# Patient Record
Sex: MALE | Race: WHITE | NOT HISPANIC OR LATINO | Employment: FULL TIME | URBAN - METROPOLITAN AREA
[De-identification: names, ages, dates, MRNs, and addresses within clinical notes are randomized per-mention and may not be internally consistent; named-entity substitution may affect disease eponyms.]

---

## 2017-04-02 ENCOUNTER — APPOINTMENT (OUTPATIENT)
Dept: CT IMAGING | Facility: HOSPITAL | Age: 27
End: 2017-04-02

## 2017-04-02 PROCEDURE — 70450 CT HEAD/BRAIN W/O DYE: CPT

## 2017-04-02 PROCEDURE — 99282 EMERGENCY DEPT VISIT SF MDM: CPT

## 2017-04-03 ENCOUNTER — HOSPITAL ENCOUNTER (EMERGENCY)
Facility: HOSPITAL | Age: 27
Discharge: HOME | End: 2017-04-03
Attending: EMERGENCY MEDICINE | Admitting: EMERGENCY MEDICINE

## 2017-04-03 VITALS
HEIGHT: 69 IN | WEIGHT: 193 LBS | TEMPERATURE: 97.9 F | DIASTOLIC BLOOD PRESSURE: 93 MMHG | HEART RATE: 87 BPM | BODY MASS INDEX: 28.58 KG/M2 | SYSTOLIC BLOOD PRESSURE: 139 MMHG | RESPIRATION RATE: 16 BRPM | OXYGEN SATURATION: 98 %

## 2017-04-03 DIAGNOSIS — S09.90XA CLOSED HEAD INJURY, INITIAL ENCOUNTER: Primary | ICD-10-CM

## 2017-04-03 DIAGNOSIS — S06.0X0A CONCUSSION, WITHOUT LOSS OF CONSCIOUSNESS, INITIAL ENCOUNTER: ICD-10-CM
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
## 2017-09-07 ENCOUNTER — APPOINTMENT (OUTPATIENT)
Dept: CT IMAGING | Facility: HOSPITAL | Age: 27
End: 2017-09-07

## 2017-09-07 ENCOUNTER — HOSPITAL ENCOUNTER (EMERGENCY)
Facility: HOSPITAL | Age: 27
Discharge: HOME | End: 2017-09-07
Attending: EMERGENCY MEDICINE | Admitting: EMERGENCY MEDICINE

## 2017-09-07 VITALS
SYSTOLIC BLOOD PRESSURE: 128 MMHG | HEIGHT: 69 IN | RESPIRATION RATE: 24 BRPM | BODY MASS INDEX: 29.33 KG/M2 | DIASTOLIC BLOOD PRESSURE: 100 MMHG | OXYGEN SATURATION: 100 % | HEART RATE: 73 BPM | WEIGHT: 198 LBS | TEMPERATURE: 97.6 F

## 2017-09-07 DIAGNOSIS — R51.9 ACUTE NONINTRACTABLE HEADACHE, UNSPECIFIED HEADACHE TYPE: Primary | ICD-10-CM

## 2017-09-07 PROCEDURE — 25010000002 DIPHENHYDRAMINE PER 50 MG: Performed by: EMERGENCY MEDICINE

## 2017-09-07 PROCEDURE — 70450 CT HEAD/BRAIN W/O DYE: CPT

## 2017-09-07 PROCEDURE — 96375 TX/PRO/DX INJ NEW DRUG ADDON: CPT

## 2017-09-07 PROCEDURE — 99283 EMERGENCY DEPT VISIT LOW MDM: CPT

## 2017-09-07 PROCEDURE — 96374 THER/PROPH/DIAG INJ IV PUSH: CPT

## 2017-09-07 PROCEDURE — 25010000002 METOCLOPRAMIDE PER 10 MG: Performed by: EMERGENCY MEDICINE

## 2017-09-07 RX ORDER — METOCLOPRAMIDE HYDROCHLORIDE 5 MG/ML
10 INJECTION INTRAMUSCULAR; INTRAVENOUS
Status: DISCONTINUED | OUTPATIENT
Start: 2017-09-07 | End: 2017-09-07 | Stop reason: HOSPADM

## 2017-09-07 RX ORDER — SODIUM CHLORIDE 0.9 % (FLUSH) 0.9 %
10 SYRINGE (ML) INJECTION AS NEEDED
Status: DISCONTINUED | OUTPATIENT
Start: 2017-09-07 | End: 2017-09-07 | Stop reason: HOSPADM

## 2017-09-07 RX ORDER — MORPHINE SULFATE 2 MG/ML
2 INJECTION, SOLUTION INTRAMUSCULAR; INTRAVENOUS
Status: DISCONTINUED | OUTPATIENT
Start: 2017-09-07 | End: 2017-09-07

## 2017-09-07 RX ORDER — DIPHENHYDRAMINE HYDROCHLORIDE 50 MG/ML
25 INJECTION INTRAMUSCULAR; INTRAVENOUS ONCE
Status: COMPLETED | OUTPATIENT
Start: 2017-09-07 | End: 2017-09-07

## 2017-09-07 RX ADMIN — METOCLOPRAMIDE 10 MG: 5 INJECTION, SOLUTION INTRAMUSCULAR; INTRAVENOUS at 06:37

## 2017-09-07 RX ADMIN — DIPHENHYDRAMINE HYDROCHLORIDE 25 MG: 50 INJECTION INTRAMUSCULAR; INTRAVENOUS at 06:35

## 2017-09-07 RX ADMIN — SODIUM CHLORIDE 1000 ML: 9 INJECTION, SOLUTION INTRAVENOUS at 06:41

## 2017-09-07 NOTE — ED PROVIDER NOTES
Subjective   HPI Comments: Pt is a 27 year old  who presents with acute onset severe headache at 0300 this AM.  He states that the pain was initially stabbing and severe, but is slowly improving.  Current dull and 5/10.    Patient is a 27 y.o. male presenting with headaches.   History provided by:  Patient  Headache   Pain location:  L parietal and L temporal  Quality:  Dull (Stabbing when at its worst.)  Radiates to:  Does not radiate  Severity currently:  5/10  Severity at highest:  10/10  Onset quality:  Sudden  Duration:  3 hours  Timing:  Constant  Progression:  Improving  Chronicity:  New  Similar to prior headaches: no    Context comment:  Awoke from sleep  Relieved by:  Nothing  Worsened by:  Nothing  Associated symptoms: nausea    Associated symptoms: no abdominal pain, no back pain, no blurred vision, no congestion, no cough, no diarrhea, no dizziness, no drainage, no ear pain, no eye pain, no facial pain, no fatigue, no fever, no focal weakness, no hearing loss, no loss of balance, no myalgias, no near-syncope, no neck pain, no neck stiffness, no numbness, no paresthesias, no photophobia, no seizures, no sinus pressure, no sore throat, no swollen glands, no syncope, no tingling, no URI, no visual change, no vomiting and no weakness        Review of Systems   Constitutional: Negative for fatigue and fever.   HENT: Negative for congestion, ear pain, hearing loss, postnasal drip, sinus pressure and sore throat.    Eyes: Negative for blurred vision, photophobia and pain.   Respiratory: Negative for cough.    Cardiovascular: Negative for syncope and near-syncope.   Gastrointestinal: Positive for nausea. Negative for abdominal pain, diarrhea and vomiting.   Musculoskeletal: Negative for back pain, myalgias, neck pain and neck stiffness.   Neurological: Positive for headaches. Negative for dizziness, focal weakness, seizures, weakness, numbness, paresthesias and loss of balance.   All other  systems reviewed and are negative.      History reviewed. No pertinent past medical history.    Allergies   Allergen Reactions   • Other Other (See Comments)     Narcotics.  Pt states no narcotics.       History reviewed. No pertinent surgical history.    History reviewed. No pertinent family history.    Social History     Social History   • Marital status: Single     Spouse name: N/A   • Number of children: N/A   • Years of education: N/A     Social History Main Topics   • Smoking status: Current Every Day Smoker     Packs/day: 1.00     Years: 5.00   • Smokeless tobacco: None   • Alcohol use No   • Drug use: No   • Sexual activity: Defer     Other Topics Concern   • None     Social History Narrative   • None           Objective   Physical Exam   Constitutional: He is oriented to person, place, and time. No distress.   HENT:   Head: Normocephalic and atraumatic.   Eyes: Conjunctivae and EOM are normal. Pupils are equal, round, and reactive to light.   Neck: Normal range of motion and full passive range of motion without pain. Neck supple. No spinous process tenderness present. No rigidity. Normal range of motion present. No Brudzinski's sign and no Kernig's sign noted. No thyromegaly present.   Cardiovascular: Normal rate, regular rhythm and normal heart sounds.  Exam reveals no gallop and no friction rub.    No murmur heard.  Pulmonary/Chest: Effort normal and breath sounds normal. No respiratory distress.   Abdominal: Soft. Bowel sounds are normal. There is no tenderness.   Musculoskeletal: Normal range of motion.   Lymphadenopathy:     He has no cervical adenopathy.   Neurological: He is alert and oriented to person, place, and time.   Skin: Skin is warm and dry.   Psychiatric: He has a normal mood and affect.   Nursing note and vitals reviewed.      Procedures         ED Course  ED Course      No results found for this or any previous visit (from the past 24 hour(s)).  Note: In addition to lab results from this  visit, the labs listed above may include labs taken at another facility or during a different encounter within the last 24 hours. Please correlate lab times with ED admission and discharge times for further clarification of the services performed during this visit.    CT Head Without Contrast   Final Result   Abnormal   1. No acute intracranial abnormality identified.      THIS DOCUMENT HAS BEEN ELECTRONICALLY SIGNED BY SOLITARIO PARRISH MD        Vitals:    09/07/17 0513 09/07/17 0530 09/07/17 0600 09/07/17 0642   BP: 139/95 138/98 130/92 128/100   BP Location:       Patient Position:       Pulse:  67  73   Resp:       Temp:       TempSrc:       SpO2:  98% 98% 100%   Weight:       Height:         Medications   sodium chloride 0.9 % bolus 1,000 mL (0 mL Intravenous Stopped 9/7/17 0706)   diphenhydrAMINE (BENADRYL) injection 25 mg (25 mg Intravenous Given 9/7/17 0635)     ECG/EMG Results (last 24 hours)     ** No results found for the last 24 hours. **          Given the onset of pt's pain I strongly recommended a lumbar puncture be performed.  Initially pt seemed open to this and I reviewed the standard indications and risks for informed consent and pt was accepting to the procedure.  Following the nurse placing pt's IV he became very anxious and stated that he had to leave the hospital.  He denies an acute increase in pain but refused to give a reason for his need to leave and persistently declined LP.  I told pt to return if he changes his mind or other concerns arise.  He understands the risk of missed SAH is permanent disability or death.              MDM    Final diagnoses:   Acute nonintractable headache, unspecified headache type            Bob Marin DO  09/08/17 0110

## 2018-06-02 VITALS
WEIGHT: 187 LBS | HEART RATE: 103 BPM | SYSTOLIC BLOOD PRESSURE: 131 MMHG | HEIGHT: 69 IN | OXYGEN SATURATION: 96 % | BODY MASS INDEX: 27.7 KG/M2 | DIASTOLIC BLOOD PRESSURE: 81 MMHG | TEMPERATURE: 98.7 F | RESPIRATION RATE: 18 BRPM

## 2018-06-02 PROCEDURE — 99283 EMERGENCY DEPT VISIT LOW MDM: CPT

## 2018-06-03 ENCOUNTER — HOSPITAL ENCOUNTER (EMERGENCY)
Facility: HOSPITAL | Age: 28
Discharge: HOME | End: 2018-06-03
Attending: EMERGENCY MEDICINE | Admitting: EMERGENCY MEDICINE

## 2018-06-03 DIAGNOSIS — H10.31 ACUTE CONJUNCTIVITIS OF RIGHT EYE, UNSPECIFIED ACUTE CONJUNCTIVITIS TYPE: Primary | ICD-10-CM

## 2018-06-03 RX ORDER — ERYTHROMYCIN 5 MG/G
OINTMENT OPHTHALMIC EVERY 6 HOURS
Qty: 3.5 G | Refills: 0 | Status: SHIPPED | OUTPATIENT
Start: 2018-06-03 | End: 2018-06-08

## 2018-06-03 NOTE — ED PROVIDER NOTES
Subjective   Mr. Bradley Mukherjee is a 28 y.o. male who presents to the ED with c/o eye itching and discomfort. onset approximately 1 week ago. Pt reports about 1 week ago he had sudden onset of R eye redness and itching with associated burning sensation. He notes when he wakes the R eye will have crusting. He denies any other acute sx at this time.               History provided by:  Patient  Eye Problem   Location:  Right eye  Quality:  Burning  Severity:  Mild  Onset quality:  Sudden  Duration:  1 week  Timing:  Constant  Progression:  Unchanged  Chronicity:  New  Relieved by:  None tried  Worsened by:  Nothing  Ineffective treatments:  None tried  Associated symptoms: crusting and redness        Review of Systems   Eyes: Positive for redness.        R eye crusting    All other systems reviewed and are negative.      History reviewed. No pertinent past medical history.    Allergies   Allergen Reactions   • Other Other (See Comments)     Narcotics.  Pt states no narcotics.       History reviewed. No pertinent surgical history.    History reviewed. No pertinent family history.    Social History     Social History   • Marital status: Single     Social History Main Topics   • Smoking status: Current Every Day Smoker     Packs/day: 1.00     Years: 5.00   • Alcohol use No   • Drug use: No   • Sexual activity: Defer     Other Topics Concern   • Not on file         Objective   Physical Exam   Constitutional: He is oriented to person, place, and time. He appears well-developed and well-nourished. No distress.   HENT:   Head: Normocephalic and atraumatic.   Right Ear: External ear normal.   Left Ear: External ear normal.   Nose: Nose normal.   Eyes: EOM are normal. Pupils are equal, round, and reactive to light.   Erythema of conjunctiva of R eye. Vision is intact.    Neck: Normal range of motion. Neck supple.   Cardiovascular: Normal rate, regular rhythm and normal heart sounds.  Exam reveals no gallop and no friction  "rub.    No murmur heard.  Pulmonary/Chest: Effort normal and breath sounds normal. No respiratory distress. He has no wheezes. He has no rales. He exhibits no tenderness.   Abdominal: Soft. Bowel sounds are normal.   Musculoskeletal: Normal range of motion.   Neurological: He is alert and oriented to person, place, and time.   Skin: Skin is warm and dry. He is not diaphoretic.   Psychiatric: He has a normal mood and affect. His behavior is normal.   Nursing note and vitals reviewed.      Procedures         ED Course     No results found for this or any previous visit (from the past 24 hour(s)).  Note: In addition to lab results from this visit, the labs listed above may include labs taken at another facility or during a different encounter within the last 24 hours. Please correlate lab times with ED admission and discharge times for further clarification of the services performed during this visit.    No orders to display     Vitals:    06/02/18 2350   BP: 131/81   BP Location: Left arm   Patient Position: Sitting   Pulse: 103   Resp: 18   Temp: 98.7 °F (37.1 °C)   TempSrc: Oral   SpO2: 96%   Weight: 84.8 kg (187 lb)   Height: 175.3 cm (69\")     Medications - No data to display  ECG/EMG Results (last 24 hours)     ** No results found for the last 24 hours. **                        MDM    Final diagnoses:   Acute conjunctivitis of right eye, unspecified acute conjunctivitis type       Documentation assistance provided by cristo Mishra.  Information recorded by the scribe was done at my direction and has been verified and validated by me.     Warren Mishra  06/03/18 0138       Bob Marin DO  06/07/18 2117    "

## 2018-07-05 ENCOUNTER — HOSPITAL ENCOUNTER (EMERGENCY)
Facility: HOSPITAL | Age: 28
Discharge: HOME | End: 2018-07-05
Attending: EMERGENCY MEDICINE | Admitting: EMERGENCY MEDICINE

## 2018-07-05 VITALS
BODY MASS INDEX: 29.33 KG/M2 | OXYGEN SATURATION: 97 % | SYSTOLIC BLOOD PRESSURE: 123 MMHG | DIASTOLIC BLOOD PRESSURE: 82 MMHG | TEMPERATURE: 98.3 F | HEART RATE: 71 BPM | WEIGHT: 198 LBS | HEIGHT: 69 IN | RESPIRATION RATE: 18 BRPM

## 2018-07-05 DIAGNOSIS — T67.5XXA HEAT EXHAUSTION, INITIAL ENCOUNTER: Primary | ICD-10-CM

## 2018-07-05 LAB
ALBUMIN SERPL-MCNC: 4.45 G/DL (ref 3.2–4.8)
ALBUMIN/GLOB SERPL: 1.5 G/DL (ref 1.5–2.5)
ALP SERPL-CCNC: 123 U/L (ref 25–100)
ALT SERPL W P-5'-P-CCNC: 50 U/L (ref 7–40)
ANION GAP SERPL CALCULATED.3IONS-SCNC: 6 MMOL/L (ref 3–11)
AST SERPL-CCNC: 28 U/L (ref 0–33)
BACTERIA UR QL AUTO: NORMAL /HPF
BASOPHILS # BLD AUTO: 0.02 10*3/MM3 (ref 0–0.2)
BASOPHILS NFR BLD AUTO: 0.3 % (ref 0–1)
BILIRUB SERPL-MCNC: 0.7 MG/DL (ref 0.3–1.2)
BILIRUB UR QL STRIP: NEGATIVE
BUN BLD-MCNC: 9 MG/DL (ref 9–23)
BUN/CREAT SERPL: 8.8 (ref 7–25)
CALCIUM SPEC-SCNC: 9.1 MG/DL (ref 8.7–10.4)
CHLORIDE SERPL-SCNC: 106 MMOL/L (ref 99–109)
CK SERPL-CCNC: 530 U/L (ref 26–174)
CLARITY UR: ABNORMAL
CO2 SERPL-SCNC: 28 MMOL/L (ref 20–31)
COLOR UR: YELLOW
CREAT BLD-MCNC: 1.02 MG/DL (ref 0.6–1.3)
DEPRECATED RDW RBC AUTO: 40.2 FL (ref 37–54)
EOSINOPHIL # BLD AUTO: 0.22 10*3/MM3 (ref 0–0.3)
EOSINOPHIL NFR BLD AUTO: 2.9 % (ref 0–3)
ERYTHROCYTE [DISTWIDTH] IN BLOOD BY AUTOMATED COUNT: 12.4 % (ref 11.3–14.5)
GFR SERPL CREATININE-BSD FRML MDRD: 87 ML/MIN/1.73
GLOBULIN UR ELPH-MCNC: 3.1 GM/DL
GLUCOSE BLD-MCNC: 98 MG/DL (ref 70–100)
GLUCOSE UR STRIP-MCNC: NEGATIVE MG/DL
HCT VFR BLD AUTO: 48 % (ref 38.9–50.9)
HGB BLD-MCNC: 16.5 G/DL (ref 13.1–17.5)
HGB UR QL STRIP.AUTO: NEGATIVE
HOLD SPECIMEN: NORMAL
HOLD SPECIMEN: NORMAL
HYALINE CASTS UR QL AUTO: NORMAL /LPF
IMM GRANULOCYTES # BLD: 0.01 10*3/MM3 (ref 0–0.03)
IMM GRANULOCYTES NFR BLD: 0.1 % (ref 0–0.6)
KETONES UR QL STRIP: NEGATIVE
LEUKOCYTE ESTERASE UR QL STRIP.AUTO: NEGATIVE
LYMPHOCYTES # BLD AUTO: 2.38 10*3/MM3 (ref 0.6–4.8)
LYMPHOCYTES NFR BLD AUTO: 31.3 % (ref 24–44)
MAGNESIUM SERPL-MCNC: 2.1 MG/DL (ref 1.3–2.7)
MCH RBC QN AUTO: 30.8 PG (ref 27–31)
MCHC RBC AUTO-ENTMCNC: 34.4 G/DL (ref 32–36)
MCV RBC AUTO: 89.6 FL (ref 80–99)
MONOCYTES # BLD AUTO: 0.68 10*3/MM3 (ref 0–1)
MONOCYTES NFR BLD AUTO: 8.9 % (ref 0–12)
MYOGLOBIN SERPL-MCNC: 85 NG/ML (ref 3–110)
NEUTROPHILS # BLD AUTO: 4.3 10*3/MM3 (ref 1.5–8.3)
NEUTROPHILS NFR BLD AUTO: 56.6 % (ref 41–71)
NITRITE UR QL STRIP: NEGATIVE
PH UR STRIP.AUTO: <=5 [PH] (ref 5–8)
PLATELET # BLD AUTO: 205 10*3/MM3 (ref 150–450)
PMV BLD AUTO: 9.6 FL (ref 6–12)
POTASSIUM BLD-SCNC: 4.3 MMOL/L (ref 3.5–5.5)
PROT SERPL-MCNC: 7.5 G/DL (ref 5.7–8.2)
PROT UR QL STRIP: ABNORMAL
RBC # BLD AUTO: 5.36 10*6/MM3 (ref 4.2–5.76)
RBC # UR: NORMAL /HPF
REF LAB TEST METHOD: NORMAL
SODIUM BLD-SCNC: 140 MMOL/L (ref 132–146)
SP GR UR STRIP: 1.03 (ref 1–1.03)
SQUAMOUS #/AREA URNS HPF: NORMAL /HPF
UROBILINOGEN UR QL STRIP: ABNORMAL
WBC NRBC COR # BLD: 7.6 10*3/MM3 (ref 3.5–10.8)
WBC UR QL AUTO: NORMAL /HPF
WHOLE BLOOD HOLD SPECIMEN: NORMAL
WHOLE BLOOD HOLD SPECIMEN: NORMAL

## 2018-07-05 PROCEDURE — 85025 COMPLETE CBC W/AUTO DIFF WBC: CPT | Performed by: EMERGENCY MEDICINE

## 2018-07-05 PROCEDURE — 80053 COMPREHEN METABOLIC PANEL: CPT | Performed by: EMERGENCY MEDICINE

## 2018-07-05 PROCEDURE — 96360 HYDRATION IV INFUSION INIT: CPT

## 2018-07-05 PROCEDURE — 83735 ASSAY OF MAGNESIUM: CPT | Performed by: EMERGENCY MEDICINE

## 2018-07-05 PROCEDURE — 83874 ASSAY OF MYOGLOBIN: CPT | Performed by: EMERGENCY MEDICINE

## 2018-07-05 PROCEDURE — 93005 ELECTROCARDIOGRAM TRACING: CPT | Performed by: EMERGENCY MEDICINE

## 2018-07-05 PROCEDURE — 82550 ASSAY OF CK (CPK): CPT | Performed by: EMERGENCY MEDICINE

## 2018-07-05 PROCEDURE — 99284 EMERGENCY DEPT VISIT MOD MDM: CPT

## 2018-07-05 PROCEDURE — 81001 URINALYSIS AUTO W/SCOPE: CPT | Performed by: EMERGENCY MEDICINE

## 2018-07-05 PROCEDURE — 93005 ELECTROCARDIOGRAM TRACING: CPT

## 2018-07-05 RX ORDER — SODIUM CHLORIDE 0.9 % (FLUSH) 0.9 %
10 SYRINGE (ML) INJECTION AS NEEDED
Status: DISCONTINUED | OUTPATIENT
Start: 2018-07-05 | End: 2018-07-05 | Stop reason: HOSPADM

## 2018-07-05 RX ADMIN — SODIUM CHLORIDE 2000 ML: 9 INJECTION, SOLUTION INTRAVENOUS at 16:57

## 2018-07-05 NOTE — DISCHARGE INSTRUCTIONS
Avoid heat and physical exertion for 48-72 hours.  Drink lots of water.    Follow up with one of the physician centers below to setup primary care.    [facility], [phone], [address], [address], [place] [address]    [facility], [phone], [address], [phone], [address] [place], [place] [address] [place] [address];     [facility], [phone], [address] [place] [address]

## 2018-07-05 NOTE — ED PROVIDER NOTES
Subjective   Bradley Mukherjee is a 28 y.o.male who presents to the ED by personal vehicle with c/o generalized weakness with onset today. He felt at his baseline yesterday. 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 Pepper and Vanilla Coke. He did not party last night for the holiday and has been sober for the past 7 years. Today, he developed weakness and was unable to lift the veronica off his truck bed that he normally does so with ease. He denies fevers, vomiting, chest pain, shortness of breath, abdominal pain, nausea, vomiting or any other complaints at this time. He denies any daily medications or past medical history.             History provided by:  Patient  Weakness - Generalized   Severity:  Moderate  Onset quality:  Sudden  Duration:  1 day  Timing:  Constant  Progression:  Worsening  Chronicity:  New  Context: dehydration    Relieved by:  None tried  Worsened by:  Nothing  Ineffective treatments:  None tried  Associated symptoms: no abdominal pain, no diarrhea, no fever, no nausea and no vomiting    Risk factors: no congestive heart failure, no coronary artery disease, no diabetes and no new medications        Review of Systems   Constitutional: Negative for chills and fever.   Gastrointestinal: Negative for abdominal pain, diarrhea, nausea and vomiting.   Neurological: Positive for weakness.   All other systems reviewed and are negative.      History reviewed. No pertinent past medical history.    Allergies   Allergen Reactions   • Other Other (See Comments)     Narcotics.  Pt states no narcotics.       History reviewed. No pertinent surgical history.    History reviewed. No pertinent family history.    Social History     Social History   • Marital status: Single     Social History Main Topics   • Smoking status: Current Every Day Smoker     Packs/day: 1.00     Years: 5.00   • Alcohol use No   • Drug use: No   • Sexual activity: Defer     Other  Topics Concern   • Not on file         Objective   Physical Exam   Constitutional: He is oriented to person, place, and time. He appears well-developed and well-nourished. No distress.   HENT:   Head: Normocephalic and atraumatic.   Right Ear: External ear normal.   Left Ear: External ear normal.   Nose: Nose normal.   Eyes: Conjunctivae are normal. No scleral icterus.   Neck: Normal range of motion. Neck supple.   Cardiovascular: Normal rate, regular rhythm and normal heart sounds.  Exam reveals no friction rub.    No murmur heard.  Pulmonary/Chest: Effort normal and breath sounds normal. No respiratory distress. He has no wheezes. He has no rales.   Abdominal: Soft. Bowel sounds are normal. He exhibits no distension. There is no tenderness.   Musculoskeletal: Normal range of motion. He exhibits no edema or tenderness.   Neurological: He is alert and oriented to person, place, and time.   Skin: Skin is warm and dry. He is not diaphoretic.   Psychiatric: He has a normal mood and affect. His behavior is normal.   Nursing note and vitals reviewed.      Procedures         ED Course     CK elevated.  Labs otherwise benign.  Favor mild heat illness.  Saline given.  Patient stable on serial rechecks.  Discussed findings, concerns, plan of care, expected course, reasons to return and followup.                  MDM  Number of Diagnoses or Management Options  Heat exhaustion, initial encounter:      Amount and/or Complexity of Data Reviewed  Clinical lab tests: ordered and reviewed        Final diagnoses:   Heat exhaustion, initial encounter       Documentation assistance provided by cristo Luna.  Information recorded by the scribe was done at my direction and has been verified and validated by me.     Evans Luna  07/05/18 1721       Benjamin Samayoa MD  07/05/18 7216

## 2019-03-26 ENCOUNTER — OFFICE VISIT (OUTPATIENT)
Dept: INTERNAL MEDICINE | Facility: CLINIC | Age: 29
End: 2019-03-26

## 2019-03-26 VITALS
DIASTOLIC BLOOD PRESSURE: 90 MMHG | HEART RATE: 90 BPM | RESPIRATION RATE: 16 BRPM | SYSTOLIC BLOOD PRESSURE: 138 MMHG | BODY MASS INDEX: 30.84 KG/M2 | OXYGEN SATURATION: 98 % | WEIGHT: 208.2 LBS | HEIGHT: 69 IN | TEMPERATURE: 97.6 F

## 2019-03-26 DIAGNOSIS — R10.9 FLANK PAIN: ICD-10-CM

## 2019-03-26 DIAGNOSIS — M54.6 THORACIC BACK PAIN, UNSPECIFIED BACK PAIN LATERALITY, UNSPECIFIED CHRONICITY: Primary | ICD-10-CM

## 2019-03-26 LAB
ALBUMIN SERPL-MCNC: 4.23 G/DL (ref 3.2–4.8)
ALBUMIN/GLOB SERPL: 1.7 G/DL (ref 1.5–2.5)
ALP SERPL-CCNC: 133 U/L (ref 25–100)
ALT SERPL W P-5'-P-CCNC: 46 U/L (ref 7–40)
AMPHET+METHAMPHET UR QL: NEGATIVE
AMPHETAMINES UR QL: NEGATIVE
ANION GAP SERPL CALCULATED.3IONS-SCNC: 8 MMOL/L (ref 3–11)
AST SERPL-CCNC: 23 U/L (ref 0–33)
BARBITURATES UR QL SCN: NEGATIVE
BASOPHILS # BLD AUTO: 0.02 10*3/MM3 (ref 0–0.2)
BASOPHILS NFR BLD AUTO: 0.2 % (ref 0–1)
BENZODIAZ UR QL SCN: NEGATIVE
BILIRUB BLD-MCNC: NEGATIVE MG/DL
BILIRUB SERPL-MCNC: 0.5 MG/DL (ref 0.3–1.2)
BUN BLD-MCNC: 10 MG/DL (ref 9–23)
BUN/CREAT SERPL: 10.3 (ref 7–25)
BUPRENORPHINE SERPL-MCNC: NEGATIVE NG/ML
CALCIUM SPEC-SCNC: 9.3 MG/DL (ref 8.7–10.4)
CANNABINOIDS SERPL QL: NEGATIVE
CHLORIDE SERPL-SCNC: 104 MMOL/L (ref 99–109)
CLARITY, POC: CLEAR
CO2 SERPL-SCNC: 26 MMOL/L (ref 20–31)
COCAINE UR QL: NEGATIVE
COLOR UR: YELLOW
CREAT BLD-MCNC: 0.97 MG/DL (ref 0.6–1.3)
DEPRECATED RDW RBC AUTO: 43.3 FL (ref 37–54)
EOSINOPHIL # BLD AUTO: 0.36 10*3/MM3 (ref 0–0.3)
EOSINOPHIL NFR BLD AUTO: 4.2 % (ref 0–3)
ERYTHROCYTE [DISTWIDTH] IN BLOOD BY AUTOMATED COUNT: 12.7 % (ref 11.3–14.5)
GFR SERPL CREATININE-BSD FRML MDRD: 92 ML/MIN/1.73
GLOBULIN UR ELPH-MCNC: 2.5 GM/DL
GLUCOSE BLD-MCNC: 94 MG/DL (ref 70–100)
GLUCOSE UR STRIP-MCNC: NEGATIVE MG/DL
HCT VFR BLD AUTO: 50.9 % (ref 38.9–50.9)
HGB BLD-MCNC: 16.8 G/DL (ref 13.1–17.5)
IMM GRANULOCYTES # BLD AUTO: 0.02 10*3/MM3 (ref 0–0.05)
IMM GRANULOCYTES NFR BLD AUTO: 0.2 % (ref 0–0.6)
KETONES UR QL: NEGATIVE
LEUKOCYTE EST, POC: NEGATIVE
LYMPHOCYTES # BLD AUTO: 2.8 10*3/MM3 (ref 0.6–4.8)
LYMPHOCYTES NFR BLD AUTO: 32.4 % (ref 24–44)
MCH RBC QN AUTO: 30.9 PG (ref 27–31)
MCHC RBC AUTO-ENTMCNC: 33 G/DL (ref 32–36)
MCV RBC AUTO: 93.7 FL (ref 80–99)
METHADONE UR QL SCN: NEGATIVE
MONOCYTES # BLD AUTO: 0.91 10*3/MM3 (ref 0–1)
MONOCYTES NFR BLD AUTO: 10.5 % (ref 0–12)
NEUTROPHILS # BLD AUTO: 4.55 10*3/MM3 (ref 1.5–8.3)
NEUTROPHILS NFR BLD AUTO: 52.7 % (ref 41–71)
NITRITE UR-MCNC: NEGATIVE MG/ML
OPIATES UR QL: NEGATIVE
OXYCODONE UR QL SCN: NEGATIVE
PCP UR QL SCN: NEGATIVE
PH UR: 5 [PH] (ref 5–8)
PLATELET # BLD AUTO: 215 10*3/MM3 (ref 150–450)
PMV BLD AUTO: 10.1 FL (ref 6–12)
POTASSIUM BLD-SCNC: 4.2 MMOL/L (ref 3.5–5.5)
PROPOXYPH UR QL: NEGATIVE
PROT SERPL-MCNC: 6.7 G/DL (ref 5.7–8.2)
PROT UR STRIP-MCNC: NEGATIVE MG/DL
RBC # BLD AUTO: 5.43 10*6/MM3 (ref 4.2–5.76)
RBC # UR STRIP: NEGATIVE /UL
SODIUM BLD-SCNC: 138 MMOL/L (ref 132–146)
SP GR UR: 1.03 (ref 1–1.03)
TRICYCLICS UR QL SCN: NEGATIVE
UROBILINOGEN UR QL: NORMAL
WBC NRBC COR # BLD: 8.64 10*3/MM3 (ref 3.5–10.8)

## 2019-03-26 PROCEDURE — 85025 COMPLETE CBC W/AUTO DIFF WBC: CPT | Performed by: NURSE PRACTITIONER

## 2019-03-26 PROCEDURE — 80053 COMPREHEN METABOLIC PANEL: CPT | Performed by: NURSE PRACTITIONER

## 2019-03-26 PROCEDURE — 80306 DRUG TEST PRSMV INSTRMNT: CPT | Performed by: NURSE PRACTITIONER

## 2019-03-26 PROCEDURE — 99203 OFFICE O/P NEW LOW 30 MIN: CPT | Performed by: NURSE PRACTITIONER

## 2019-03-26 PROCEDURE — 81003 URINALYSIS AUTO W/O SCOPE: CPT | Performed by: NURSE PRACTITIONER

## 2019-03-26 RX ORDER — METHOCARBAMOL 750 MG/1
750 TABLET, FILM COATED ORAL NIGHTLY PRN
Qty: 30 TABLET | Refills: 2 | Status: SHIPPED | OUTPATIENT
Start: 2019-03-26 | End: 2019-07-16 | Stop reason: SDUPTHER

## 2019-03-26 RX ORDER — NAPROXEN 500 MG/1
500 TABLET ORAL 2 TIMES DAILY WITH MEALS
Qty: 60 TABLET | Refills: 2 | Status: SHIPPED | OUTPATIENT
Start: 2019-03-26 | End: 2019-07-16 | Stop reason: SDUPTHER

## 2019-03-26 NOTE — PROGRESS NOTES
Subjective   Bradley Mukherjee is a 28 y.o. male    Chief Complaint   Patient presents with   • Establish Care   • Chronic back pain     New pt here to establish care.    Back Pain   This is a recurrent problem. The current episode started more than 1 year ago. The problem occurs constantly. The problem is unchanged. The pain is present in the thoracic spine and lumbar spine. The quality of the pain is described as aching. The pain does not radiate. The pain is at a severity of 7/10. The pain is worse during the day. The symptoms are aggravated by bending, position and twisting. Stiffness is present all day. Pertinent negatives include no abdominal pain, bladder incontinence, bowel incontinence, chest pain, dysuria, fever, headaches, leg pain, numbness, paresis, paresthesias, pelvic pain, perianal numbness, tingling, weakness or weight loss. He has tried analgesics (tylenol) for the symptoms. The treatment provided no relief.      Past Medical History:   Diagnosis Date   • History of drug abuse    • History of heart attack     Due to drug over dose     History reviewed. No pertinent surgical history.    Family History   Problem Relation Age of Onset   • No Known Problems Mother      Social History     Socioeconomic History   • Marital status: Single     Spouse name: Not on file   • Number of children: Not on file   • Years of education: Not on file   • Highest education level: Not on file   Tobacco Use   • Smoking status: Current Every Day Smoker     Packs/day: 2.00     Years: 5.00     Pack years: 10.00   • Smokeless tobacco: Never Used   Substance and Sexual Activity   • Alcohol use: No     Comment: History of drug abuse.    • Drug use: Yes     Types: Marijuana     Comment: last drug use, 4/15/2008; IV drugs (everything)   • Sexual activity: Defer     Allergies   Allergen Reactions   • Other Other (See Comments)     Narcotics.  Pt states no narcotics.         The following portions of the patient's history were  reviewed and updated as appropriate: allergies, current medications, past family history, past medical history, past social history, past surgical history and problem list.    Current Outpatient Medications:   •  methocarbamol (ROBAXIN) 750 MG tablet, Take 1 tablet by mouth At Night As Needed for Muscle Spasms., Disp: 30 tablet, Rfl: 2  •  naproxen (NAPROSYN) 500 MG tablet, Take 1 tablet by mouth 2 (Two) Times a Day With Meals. PRN FOR PAIN, Disp: 60 tablet, Rfl: 2     Review of Systems   Constitutional: Negative for chills, fatigue, fever and weight loss.   Respiratory: Negative for cough, chest tightness and shortness of breath.    Cardiovascular: Negative for chest pain.   Gastrointestinal: Negative for abdominal pain, bowel incontinence, diarrhea, nausea and vomiting.   Endocrine: Negative for cold intolerance and heat intolerance.   Genitourinary: Negative for bladder incontinence, dysuria and pelvic pain.   Musculoskeletal: Positive for back pain. Negative for arthralgias.   Neurological: Negative for dizziness, tingling, weakness, numbness, headaches and paresthesias.       Objective   Physical Exam   Constitutional: He is oriented to person, place, and time. He appears well-developed and well-nourished.   HENT:   Head: Normocephalic and atraumatic.   Eyes: Conjunctivae and EOM are normal. Pupils are equal, round, and reactive to light.   Neck: Normal range of motion.   Cardiovascular: Normal rate, regular rhythm and normal heart sounds.   Pulmonary/Chest: Effort normal and breath sounds normal.   Abdominal: Soft. Bowel sounds are normal.   Musculoskeletal:        Thoracic back: He exhibits decreased range of motion, tenderness and pain. He exhibits no bony tenderness, no swelling, no edema, no deformity, no laceration, no spasm and normal pulse.   Neurological: He is alert and oriented to person, place, and time. He has normal reflexes.   Skin: Skin is warm and dry.   Psychiatric: He has a normal mood and  "affect. His behavior is normal. Judgment and thought content normal.     Vitals:    03/26/19 1033   BP: 138/90   Pulse: 90   Resp: 16   Temp: 97.6 °F (36.4 °C)   TempSrc: Temporal   SpO2: 98%   Weight: 94.4 kg (208 lb 3.2 oz)   Height: 175.3 cm (69.02\")         Assessment/Plan   Bradley was seen today for establish care and chronic back pain.    Diagnoses and all orders for this visit:    Thoracic back pain, unspecified back pain laterality, unspecified chronicity  -     POC Urinalysis Dipstick, Automated  -     CBC & Differential  -     Comprehensive Metabolic Panel  -     Urine Drug Screen - Urine, Clean Catch  -     XR Spine Thoracic 3 View  -     naproxen (NAPROSYN) 500 MG tablet; Take 1 tablet by mouth 2 (Two) Times a Day With Meals. PRN FOR PAIN  -     methocarbamol (ROBAXIN) 750 MG tablet; Take 1 tablet by mouth At Night As Needed for Muscle Spasms.  -     CBC Auto Differential    Flank pain  -     POC Urinalysis Dipstick, Automated  -     CBC & Differential  -     Comprehensive Metabolic Panel  -     Urine Drug Screen - Urine, Clean Catch  -     XR Spine Thoracic 3 View  -     naproxen (NAPROSYN) 500 MG tablet; Take 1 tablet by mouth 2 (Two) Times a Day With Meals. PRN FOR PAIN  -     methocarbamol (ROBAXIN) 750 MG tablet; Take 1 tablet by mouth At Night As Needed for Muscle Spasms.  -     CBC Auto Differential      Sent for Xray of T-spine  Labs sent  UA done  After telling pt that I will be checking labs and urine, he did report that he smokes marijuana  Naprosyn and Robaxin PRN  Discussed that he may need PT vs Ortho  Return in about 6 weeks (around 5/7/2019) for Annual.         "

## 2019-04-04 ENCOUNTER — TELEPHONE (OUTPATIENT)
Dept: INTERNAL MEDICINE | Facility: CLINIC | Age: 29
End: 2019-04-04

## 2019-04-04 NOTE — TELEPHONE ENCOUNTER
PN that MM has not review labs yet. She will be back in office on Monday and we will call with results.  He stated understanding

## 2019-04-08 DIAGNOSIS — R74.8 ELEVATED LIVER ENZYMES: Primary | ICD-10-CM

## 2019-04-08 NOTE — TELEPHONE ENCOUNTER
Liver enzymes are slightly elevated.  I have entered orders to ck additional labs.      All other labs were WNL.

## 2019-04-09 NOTE — TELEPHONE ENCOUNTER
I have tried calling patient. Recording kicks in that this wireless  customer can not accept my calls because restrictions are on. I will try again. If I do not get a hold of him, I will send a letter.

## 2019-07-12 ENCOUNTER — TELEPHONE (OUTPATIENT)
Dept: INTERNAL MEDICINE | Facility: CLINIC | Age: 29
End: 2019-07-12

## 2019-07-16 ENCOUNTER — OFFICE VISIT (OUTPATIENT)
Dept: INTERNAL MEDICINE | Facility: CLINIC | Age: 29
End: 2019-07-16

## 2019-07-16 ENCOUNTER — HOSPITAL ENCOUNTER (OUTPATIENT)
Dept: GENERAL RADIOLOGY | Facility: HOSPITAL | Age: 29
Discharge: HOME | End: 2019-07-16
Admitting: NURSE PRACTITIONER

## 2019-07-16 VITALS
WEIGHT: 213.6 LBS | HEIGHT: 69 IN | DIASTOLIC BLOOD PRESSURE: 86 MMHG | SYSTOLIC BLOOD PRESSURE: 128 MMHG | OXYGEN SATURATION: 99 % | RESPIRATION RATE: 16 BRPM | BODY MASS INDEX: 31.64 KG/M2 | HEART RATE: 104 BPM | TEMPERATURE: 97.8 F

## 2019-07-16 DIAGNOSIS — R10.9 FLANK PAIN: ICD-10-CM

## 2019-07-16 DIAGNOSIS — M54.6 THORACIC BACK PAIN, UNSPECIFIED BACK PAIN LATERALITY, UNSPECIFIED CHRONICITY: ICD-10-CM

## 2019-07-16 DIAGNOSIS — R74.8 ELEVATED LIVER ENZYMES: ICD-10-CM

## 2019-07-16 PROCEDURE — 72072 X-RAY EXAM THORAC SPINE 3VWS: CPT

## 2019-07-16 PROCEDURE — 99214 OFFICE O/P EST MOD 30 MIN: CPT | Performed by: NURSE PRACTITIONER

## 2019-07-16 RX ORDER — NAPROXEN 500 MG/1
500 TABLET ORAL 2 TIMES DAILY WITH MEALS
Qty: 60 TABLET | Refills: 2 | OUTPATIENT
Start: 2019-07-16 | End: 2019-07-26

## 2019-07-16 RX ORDER — METHOCARBAMOL 750 MG/1
750 TABLET, FILM COATED ORAL NIGHTLY PRN
Qty: 30 TABLET | Refills: 2 | Status: SHIPPED | OUTPATIENT
Start: 2019-07-16 | End: 2019-08-19

## 2019-07-16 NOTE — PROGRESS NOTES
Subjective   Bradley Mukherjee is a 29 y.o. male    Chief Complaint   Patient presents with   • Follow-up   • Back Pain     Never picked up or took the Naproxen or Robaxin that was sent in at his last visit in March   • Elevated Hepatic Enzymes     Needs to have rechecked along with the additional liver labs that he was suppose to come back in for and never did.     History of Present Illness     Here for f/u on back pain and for additional labs due to elevated liver enzymes.    Back Pain   This is a recurrent problem. The current episode started more than 1 year ago. The problem occurs constantly. The problem is unchanged. The pain is present in the thoracic spine and lumbar spine. The quality of the pain is described as aching. The pain does not radiate. The pain is at a severity of 7/10. The pain is worse during the day. The symptoms are aggravated by bending, position and twisting. Stiffness is present all day. Pertinent negatives include no abdominal pain, bladder incontinence, bowel incontinence, chest pain, dysuria, fever, headaches, leg pain, numbness, paresis, paresthesias, pelvic pain, perianal numbness, tingling, weakness or weight loss. He has tried analgesics (tylenol) for the symptoms. The treatment provided no relief.  He was to go for an Xray of his Tspine after his last visit and did not go.  He was also given Naprosyn and Robaxin and did not pick them up.  Needless to say, his sx's have not improved.    Liver enzymes were elevated on last labs.  We will repeat and add additional labs today.    The following portions of the patient's history were reviewed and updated as appropriate: allergies, current medications, past family history, past medical history, past social history, past surgical history and problem list.    Current Outpatient Medications:   •  methocarbamol (ROBAXIN) 750 MG tablet, Take 1 tablet by mouth At Night As Needed for Muscle Spasms., Disp: 30 tablet, Rfl: 2  •  naproxen  "(NAPROSYN) 500 MG tablet, Take 1 tablet by mouth 2 (Two) Times a Day With Meals. PRN FOR PAIN, Disp: 60 tablet, Rfl: 2     Review of Systems   Constitutional: Negative for chills, fatigue and fever.   Respiratory: Negative for cough, chest tightness and shortness of breath.    Cardiovascular: Negative for chest pain.   Gastrointestinal: Negative for abdominal pain, diarrhea, nausea and vomiting.   Endocrine: Negative for cold intolerance and heat intolerance.   Musculoskeletal: Positive for back pain. Negative for arthralgias.   Neurological: Negative for dizziness.       Objective   Physical Exam   Constitutional: He is oriented to person, place, and time. He appears well-developed and well-nourished.   HENT:   Head: Normocephalic and atraumatic.   Eyes: Conjunctivae and EOM are normal. Pupils are equal, round, and reactive to light.   Neck: Normal range of motion.   Cardiovascular: Normal rate, regular rhythm and normal heart sounds.   Pulmonary/Chest: Effort normal and breath sounds normal.   Abdominal: Soft. Bowel sounds are normal.   Musculoskeletal:        Thoracic back: He exhibits decreased range of motion and pain. He exhibits no tenderness, no bony tenderness, no swelling, no edema, no deformity, no laceration, no spasm and normal pulse.   Neurological: He is alert and oriented to person, place, and time. He has normal reflexes.   Skin: Skin is warm and dry.   Psychiatric: He has a normal mood and affect. His behavior is normal. Judgment and thought content normal.     Vitals:    07/16/19 1436   BP: 128/86   Pulse: 104   Resp: 16   Temp: 97.8 °F (36.6 °C)   TempSrc: Temporal   SpO2: 99%   Weight: 96.9 kg (213 lb 9.6 oz)   Height: 175.3 cm (69.02\")         Assessment/Plan   Bradley was seen today for follow-up, back pain and elevated hepatic enzymes.    Diagnoses and all orders for this visit:    Thoracic back pain, unspecified back pain laterality, unspecified chronicity  -     methocarbamol (ROBAXIN) " 750 MG tablet; Take 1 tablet by mouth At Night As Needed for Muscle Spasms.  -     naproxen (NAPROSYN) 500 MG tablet; Take 1 tablet by mouth 2 (Two) Times a Day With Meals. PRN FOR PAIN  -     XR Spine Thoracic 3 View; Future  -     Hepatitis Panel, Acute  -     Basic Metabolic Panel  -     Hepatic Function Panel    Flank pain  -     methocarbamol (ROBAXIN) 750 MG tablet; Take 1 tablet by mouth At Night As Needed for Muscle Spasms.  -     naproxen (NAPROSYN) 500 MG tablet; Take 1 tablet by mouth 2 (Two) Times a Day With Meals. PRN FOR PAIN  -     XR Spine Thoracic 3 View; Future  -     Hepatitis Panel, Acute  -     Basic Metabolic Panel  -     Hepatic Function Panel    Elevated liver enzymes  -     Cancel: Hepatic Function Panel  -     Cancel: Basic Metabolic Panel  -     Cancel: Hepatitis Panel, Acute  -     Hepatitis Panel, Acute  -     Basic Metabolic Panel  -     Hepatic Function Panel      Med sent back in  He will go for Xray  May need PT  Labs sent  Will call with results.

## 2019-07-18 LAB
ALBUMIN SERPL-MCNC: 4.4 G/DL (ref 3.5–5.2)
ALP SERPL-CCNC: 114 U/L (ref 39–117)
ALT SERPL-CCNC: 38 U/L (ref 1–41)
AST SERPL-CCNC: 12 U/L (ref 1–40)
BILIRUB DIRECT SERPL-MCNC: <0.2 MG/DL (ref 0.2–0.3)
BILIRUB SERPL-MCNC: 0.4 MG/DL (ref 0.2–1.2)
BUN SERPL-MCNC: 8 MG/DL (ref 6–20)
BUN/CREAT SERPL: 7.5 (ref 7–25)
CALCIUM SERPL-MCNC: 8.8 MG/DL (ref 8.6–10.5)
CHLORIDE SERPL-SCNC: 100 MMOL/L (ref 98–107)
CO2 SERPL-SCNC: 27.3 MMOL/L (ref 22–29)
CREAT SERPL-MCNC: 1.06 MG/DL (ref 0.76–1.27)
GLUCOSE SERPL-MCNC: 123 MG/DL (ref 65–99)
HAV IGM SERPL QL IA: NEGATIVE
HBV CORE IGM SERPL QL IA: NEGATIVE
HBV SURFACE AG SERPL QL IA: NEGATIVE
HCV AB S/CO SERPL IA: 0.2 S/CO RATIO (ref 0–0.9)
POTASSIUM SERPL-SCNC: 4.4 MMOL/L (ref 3.5–5.2)
PROT SERPL-MCNC: 6.6 G/DL (ref 6–8.5)
SODIUM SERPL-SCNC: 140 MMOL/L (ref 136–145)

## 2019-07-22 ENCOUNTER — TELEPHONE (OUTPATIENT)
Dept: INTERNAL MEDICINE | Facility: CLINIC | Age: 29
End: 2019-07-22

## 2019-07-23 ENCOUNTER — TELEPHONE (OUTPATIENT)
Dept: INTERNAL MEDICINE | Facility: CLINIC | Age: 29
End: 2019-07-23

## 2019-07-23 DIAGNOSIS — M54.6 THORACIC BACK PAIN, UNSPECIFIED BACK PAIN LATERALITY, UNSPECIFIED CHRONICITY: Primary | ICD-10-CM

## 2019-07-23 NOTE — TELEPHONE ENCOUNTER
----- Message from REGGIE Brunner sent at 7/23/2019 11:28 AM EDT -----  Xray of back was normal.  Would he like to try some PT?

## 2019-07-25 ENCOUNTER — HOSPITAL ENCOUNTER (OUTPATIENT)
Dept: GENERAL RADIOLOGY | Facility: HOSPITAL | Age: 29
Discharge: HOME | End: 2019-07-25
Admitting: NURSE PRACTITIONER

## 2019-07-25 ENCOUNTER — OFFICE VISIT (OUTPATIENT)
Dept: INTERNAL MEDICINE | Facility: CLINIC | Age: 29
End: 2019-07-25

## 2019-07-25 VITALS
RESPIRATION RATE: 16 BRPM | HEIGHT: 69 IN | BODY MASS INDEX: 31.84 KG/M2 | DIASTOLIC BLOOD PRESSURE: 88 MMHG | OXYGEN SATURATION: 98 % | SYSTOLIC BLOOD PRESSURE: 128 MMHG | WEIGHT: 215 LBS | HEART RATE: 102 BPM

## 2019-07-25 DIAGNOSIS — M54.41 MIDLINE LOW BACK PAIN WITH BILATERAL SCIATICA, UNSPECIFIED CHRONICITY: ICD-10-CM

## 2019-07-25 DIAGNOSIS — M54.42 MIDLINE LOW BACK PAIN WITH BILATERAL SCIATICA, UNSPECIFIED CHRONICITY: ICD-10-CM

## 2019-07-25 DIAGNOSIS — M54.42 MIDLINE LOW BACK PAIN WITH BILATERAL SCIATICA, UNSPECIFIED CHRONICITY: Primary | ICD-10-CM

## 2019-07-25 DIAGNOSIS — M54.41 MIDLINE LOW BACK PAIN WITH BILATERAL SCIATICA, UNSPECIFIED CHRONICITY: Primary | ICD-10-CM

## 2019-07-25 DIAGNOSIS — R39.15 URGENCY OF URINATION: ICD-10-CM

## 2019-07-25 LAB
BILIRUB BLD-MCNC: NEGATIVE MG/DL
CLARITY, POC: CLEAR
COLOR UR: YELLOW
GLUCOSE UR STRIP-MCNC: NEGATIVE MG/DL
KETONES UR QL: NEGATIVE
LEUKOCYTE EST, POC: NEGATIVE
NITRITE UR-MCNC: NEGATIVE MG/ML
PH UR: 6 [PH] (ref 5–8)
PROT UR STRIP-MCNC: NEGATIVE MG/DL
RBC # UR STRIP: NEGATIVE /UL
SP GR UR: 1.01 (ref 1–1.03)
UROBILINOGEN UR QL: NORMAL

## 2019-07-25 PROCEDURE — 72100 X-RAY EXAM L-S SPINE 2/3 VWS: CPT

## 2019-07-25 PROCEDURE — 99214 OFFICE O/P EST MOD 30 MIN: CPT | Performed by: NURSE PRACTITIONER

## 2019-07-25 RX ORDER — METHYLPREDNISOLONE 4 MG/1
TABLET ORAL
Qty: 21 EACH | Refills: 0 | Status: SHIPPED | OUTPATIENT
Start: 2019-07-25 | End: 2019-08-19

## 2019-07-25 NOTE — PROGRESS NOTES
Subjective   Bradley Mukherjee is a 29 y.o. male    Chief Complaint   Patient presents with   • Back Pain     urgency, frequency with urination. pt fell out of truck due to legs buckling and giving out on him.      Back Pain   This is a recurrent problem. The current episode started more than 1 month ago. The problem occurs constantly. The problem has been gradually worsening since onset. The pain is present in the lumbar spine. The quality of the pain is described as aching, shooting and stabbing. Radiates to: bilateral legs, Left > right. The pain is at a severity of 6/10. The pain is the same all the time. The symptoms are aggravated by lying down. Stiffness is present all day. Associated symptoms include bladder incontinence, leg pain, numbness, paresthesias, tingling and weakness. Pertinent negatives include no abdominal pain, bowel incontinence, chest pain, dysuria, fever, headaches, paresis, pelvic pain, perianal numbness or weight loss. Risk factors: trauma in 2015 - fall from 30+ ft onto his back. He has tried NSAIDs for the symptoms. The treatment provided mild relief.      Pt states that in 2015, he fell off of a 32 ft ladder onto a  truck.  He landed flat on his back.  He went to [facility] - workup was normal.  He has had pain off since, but sx's have definitely worsened over the past few months.  No new injury trauma.      The following portions of the patient's history were reviewed and updated as appropriate: allergies, current medications, past family history, past medical history, past social history, past surgical history and problem list.    Current Outpatient Medications:   •  methocarbamol (ROBAXIN) 750 MG tablet, Take 1 tablet by mouth At Night As Needed for Muscle Spasms., Disp: 30 tablet, Rfl: 2  •  naproxen (NAPROSYN) 500 MG tablet, Take 1 tablet by mouth 2 (Two) Times a Day With Meals. PRN FOR PAIN, Disp: 60 tablet, Rfl: 2  •  methylPREDNISolone (MEDROL) 4 MG tablet, Take as  "directed on package instructions., Disp: 21 each, Rfl: 0     Review of Systems   Constitutional: Negative for chills, fatigue, fever and weight loss.   Respiratory: Negative for cough, chest tightness and shortness of breath.    Cardiovascular: Negative for chest pain.   Gastrointestinal: Negative for abdominal pain, bowel incontinence, diarrhea, nausea and vomiting.   Endocrine: Negative for cold intolerance and heat intolerance.   Genitourinary: Positive for bladder incontinence. Negative for dysuria and pelvic pain.   Musculoskeletal: Positive for back pain. Negative for arthralgias.   Neurological: Positive for tingling, weakness, numbness and paresthesias. Negative for dizziness and headaches.       Objective   Physical Exam   Constitutional: He is oriented to person, place, and time. He appears well-developed and well-nourished.   HENT:   Head: Normocephalic and atraumatic.   Eyes: Conjunctivae and EOM are normal. Pupils are equal, round, and reactive to light.   Neck: Normal range of motion.   Cardiovascular: Normal rate, regular rhythm and normal heart sounds.   Pulmonary/Chest: Effort normal and breath sounds normal.   Abdominal: Soft. Bowel sounds are normal.   Musculoskeletal:        Lumbar back: He exhibits decreased range of motion, tenderness and pain. He exhibits no bony tenderness, no swelling, no edema, no deformity, no laceration, no spasm and normal pulse.   + SLR   Neurological: He is alert and oriented to person, place, and time.   Reflex Scores:       Patellar reflexes are 2+ on the right side and 1+ on the left side.  Skin: Skin is warm and dry.   Psychiatric: He has a normal mood and affect. His behavior is normal. Judgment and thought content normal.     Vitals:    07/25/19 1416   BP: 128/88   Pulse: 102   Resp: 16   SpO2: 98%   Weight: 97.5 kg (215 lb)   Height: 175.3 cm (69.02\")         Assessment/Plan   Bradley was seen today for back pain.    Diagnoses and all orders for this " visit:    Midline low back pain with bilateral sciatica, unspecified chronicity  -     methylPREDNISolone (MEDROL) 4 MG tablet; Take as directed on package instructions.  -     XR Spine Lumbar 2 or 3 View; Future    Urgency of urination  -     POCT urinalysis dipstick, automated      We will send for an Xray of the L-spine  PT order is in  Medrol as directed  May need MRI based on results  RTC if sx's worsen or do not improve

## 2019-07-26 ENCOUNTER — HOSPITAL ENCOUNTER (EMERGENCY)
Facility: HOSPITAL | Age: 29
Discharge: HOME | End: 2019-07-26
Attending: EMERGENCY MEDICINE | Admitting: EMERGENCY MEDICINE

## 2019-07-26 ENCOUNTER — APPOINTMENT (OUTPATIENT)
Dept: CT IMAGING | Facility: HOSPITAL | Age: 29
End: 2019-07-26

## 2019-07-26 VITALS
SYSTOLIC BLOOD PRESSURE: 148 MMHG | WEIGHT: 215 LBS | HEIGHT: 69 IN | OXYGEN SATURATION: 97 % | RESPIRATION RATE: 16 BRPM | HEART RATE: 78 BPM | DIASTOLIC BLOOD PRESSURE: 90 MMHG | BODY MASS INDEX: 31.84 KG/M2 | TEMPERATURE: 98.2 F

## 2019-07-26 DIAGNOSIS — S09.90XA CLOSED HEAD INJURY, INITIAL ENCOUNTER: Primary | ICD-10-CM

## 2019-07-26 PROCEDURE — 70450 CT HEAD/BRAIN W/O DYE: CPT

## 2019-07-26 PROCEDURE — 99283 EMERGENCY DEPT VISIT LOW MDM: CPT

## 2019-07-26 RX ORDER — NAPROXEN 500 MG/1
500 TABLET ORAL 2 TIMES DAILY WITH MEALS
Qty: 20 TABLET | Refills: 0 | Status: SHIPPED | OUTPATIENT
Start: 2019-07-26 | End: 2019-10-15

## 2019-07-26 RX ORDER — ONDANSETRON 4 MG/1
4 TABLET, ORALLY DISINTEGRATING ORAL 4 TIMES DAILY PRN
Qty: 15 TABLET | Refills: 0 | Status: SHIPPED | OUTPATIENT
Start: 2019-07-26 | End: 2019-08-19

## 2019-07-28 DIAGNOSIS — R93.7 ABNORMAL X-RAY OF LUMBAR SPINE: ICD-10-CM

## 2019-07-28 DIAGNOSIS — M54.42 BILATERAL LOW BACK PAIN WITH BILATERAL SCIATICA, UNSPECIFIED CHRONICITY: Primary | ICD-10-CM

## 2019-07-28 DIAGNOSIS — M54.16 LUMBAR RADICULOPATHY: ICD-10-CM

## 2019-07-28 DIAGNOSIS — M54.41 BILATERAL LOW BACK PAIN WITH BILATERAL SCIATICA, UNSPECIFIED CHRONICITY: Primary | ICD-10-CM

## 2019-07-30 ENCOUNTER — TELEPHONE (OUTPATIENT)
Dept: INTERNAL MEDICINE | Facility: CLINIC | Age: 29
End: 2019-07-30

## 2019-08-02 ENCOUNTER — HOSPITAL ENCOUNTER (OUTPATIENT)
Dept: MRI IMAGING | Facility: HOSPITAL | Age: 29
Discharge: HOME | End: 2019-08-02
Admitting: NURSE PRACTITIONER

## 2019-08-02 DIAGNOSIS — R93.7 ABNORMAL X-RAY OF LUMBAR SPINE: ICD-10-CM

## 2019-08-02 DIAGNOSIS — M54.42 BILATERAL LOW BACK PAIN WITH BILATERAL SCIATICA, UNSPECIFIED CHRONICITY: ICD-10-CM

## 2019-08-02 DIAGNOSIS — M54.16 LUMBAR RADICULOPATHY: ICD-10-CM

## 2019-08-02 DIAGNOSIS — M54.41 BILATERAL LOW BACK PAIN WITH BILATERAL SCIATICA, UNSPECIFIED CHRONICITY: ICD-10-CM

## 2019-08-02 PROCEDURE — 72148 MRI LUMBAR SPINE W/O DYE: CPT

## 2019-08-06 ENCOUNTER — TELEPHONE (OUTPATIENT)
Dept: INTERNAL MEDICINE | Facility: CLINIC | Age: 29
End: 2019-08-06

## 2019-08-06 DIAGNOSIS — R93.7 ABNORMAL MRI, LUMBAR SPINE: ICD-10-CM

## 2019-08-06 DIAGNOSIS — M54.16 LUMBAR RADICULOPATHY: Primary | ICD-10-CM

## 2019-08-06 NOTE — TELEPHONE ENCOUNTER
Patient notified and verbalized understanding. I let him for that someone will be in contact with him soon to get the appt scheduled with NS.

## 2019-08-06 NOTE — TELEPHONE ENCOUNTER
----- Message from REGGIE Brunner sent at 8/6/2019 10:25 AM EDT -----  Please let pt know that his MRI of the L-spine is abnormal.  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      There is multilevel DDD/disc bulging that is the greatest at L5-S1.  I am going to refer him to NS for further evaluation.

## 2019-08-09 DIAGNOSIS — M43.16 SPONDYLOLISTHESIS OF LUMBAR REGION: Primary | ICD-10-CM

## 2019-08-09 DIAGNOSIS — Q06.2 DIASTEMATOMYELIA (HCC): ICD-10-CM

## 2019-08-19 ENCOUNTER — HOSPITAL ENCOUNTER (OUTPATIENT)
Dept: CT IMAGING | Facility: HOSPITAL | Age: 29
Discharge: HOME | End: 2019-08-19
Admitting: NEUROLOGICAL SURGERY

## 2019-08-19 ENCOUNTER — OFFICE VISIT (OUTPATIENT)
Dept: NEUROSURGERY | Facility: CLINIC | Age: 29
End: 2019-08-19

## 2019-08-19 VITALS — BODY MASS INDEX: 32.14 KG/M2 | HEIGHT: 69 IN | RESPIRATION RATE: 16 BRPM | WEIGHT: 217 LBS

## 2019-08-19 DIAGNOSIS — Z72.0 TOBACCO ABUSE: ICD-10-CM

## 2019-08-19 DIAGNOSIS — Q06.2 DIASTEMATOMYELIA (HCC): Primary | ICD-10-CM

## 2019-08-19 DIAGNOSIS — M43.16 SPONDYLOLISTHESIS OF LUMBAR REGION: ICD-10-CM

## 2019-08-19 DIAGNOSIS — M25.552 BILATERAL HIP PAIN: ICD-10-CM

## 2019-08-19 DIAGNOSIS — Q06.2 DIASTEMATOMYELIA (HCC): ICD-10-CM

## 2019-08-19 DIAGNOSIS — M25.551 BILATERAL HIP PAIN: ICD-10-CM

## 2019-08-19 PROCEDURE — 99203 OFFICE O/P NEW LOW 30 MIN: CPT | Performed by: NEUROLOGICAL SURGERY

## 2019-08-19 PROCEDURE — 72131 CT LUMBAR SPINE W/O DYE: CPT

## 2019-08-19 NOTE — PROGRESS NOTES
NAME: EDUARDO CLEARY   DOS: 2019  : 1990  PCP: Chandni Mckeon APRN    Chief Complaint:    Chief Complaint   Patient presents with   • Back Pain       History of Present Illness:  29 y.o. male   I saw this 29-year-old gentleman with a complex history of addiction issues however he has been clean for a long time.  He denies any recent symptomatology in the last 6 months but does have a significant history of axial back pain he works as a .  He has daily back pain which radiates to his bilateral hips occasionally in his legs he denies any bowel bladder incontinence issues and has occasional hip pain bilaterally it is better with rest it also worse with walking distances consistent with neurogenic claudication he is here for evaluation he has not been through any therapies    PMHX  Allergies:  Allergies   Allergen Reactions   • Other Other (See Comments)     Narcotics.  Pt states no narcotics.     Medications    Current Outpatient Medications:   •  naproxen (NAPROSYN) 500 MG tablet, Take 1 tablet by mouth 2 (Two) Times a Day With Meals., Disp: 20 tablet, Rfl: 0  Past Medical History:  Past Medical History:   Diagnosis Date   • History of drug abuse    • History of heart attack     Due to drug over dose     Past Surgical History:  History reviewed. No pertinent surgical history.  Social Hx:  Social History     Tobacco Use   • Smoking status: Current Every Day Smoker     Packs/day: 2.00     Years: 5.00     Pack years: 10.00   • Smokeless tobacco: Never Used   Substance Use Topics   • Alcohol use: No     Comment: History of drug abuse.    • Drug use: Yes     Types: Marijuana     Comment: last drug use, 4/15/2008; IV drugs (everything)     Family Hx:  Family History   Problem Relation Age of Onset   • No Known Problems Mother      Review of Systems:        Review of Systems   Constitutional: Negative for activity change, appetite change, chills, diaphoresis, fatigue, fever and  unexpected weight change.   HENT: Negative for congestion, dental problem, drooling, ear discharge, ear pain, facial swelling, hearing loss, mouth sores, nosebleeds, postnasal drip, rhinorrhea, sinus pressure, sneezing, sore throat, tinnitus, trouble swallowing and voice change.    Eyes: Negative for photophobia, pain, discharge, redness, itching and visual disturbance.   Respiratory: Negative for apnea, cough, choking, chest tightness, shortness of breath, wheezing and stridor.    Cardiovascular: Negative for chest pain, palpitations and leg swelling.   Gastrointestinal: Negative for abdominal distention, abdominal pain, anal bleeding, blood in stool, constipation, diarrhea, nausea, rectal pain and vomiting.   Endocrine: Negative for cold intolerance, heat intolerance, polydipsia, polyphagia and polyuria.   Genitourinary: Negative for decreased urine volume, difficulty urinating, dysuria, enuresis, flank pain, frequency, genital sores, hematuria and urgency.   Musculoskeletal: Positive for back pain. Negative for arthralgias, gait problem, joint swelling, myalgias, neck pain and neck stiffness.   Skin: Negative for color change, pallor, rash and wound.   Allergic/Immunologic: Negative for environmental allergies, food allergies and immunocompromised state.   Neurological: Negative for dizziness, tremors, seizures, syncope, facial asymmetry, speech difficulty, weakness, light-headedness, numbness and headaches.   Hematological: Negative for adenopathy. Does not bruise/bleed easily.   Psychiatric/Behavioral: Negative for agitation, behavioral problems, confusion, decreased concentration, dysphoric mood, hallucinations, self-injury, sleep disturbance and suicidal ideas. The patient is not nervous/anxious and is not hyperactive.    All other systems reviewed and are negative.       I have reviewed this note template and all pertinent parts of the review of systems social, family history, surgical history and medication  list    Physical Examination:  Vitals:    08/19/19 1441   Resp: 16      General Appearance:   Well developed, well nourished, well groomed, alert, and cooperative.  Neurological examination:  Neurologic Exam  Vital signs were reviewed and documented in the chart  Patient appeared in good neurologic function with normal comprehension fluent speech  Mood and affect are normal  Sense of smell deferred    Pupils symmetric equally reactive funduscopic exam not visualized   Visual fields intact to confrontation  Extraocular movements intact  Face motor function is symmetric  Facial sensations normal, he has poor dentition  Hearing intact to finger rub hearing intact to finger rub  Tongue is midline  Palate symmetric  Swallowing normal  Shoulder shrug normal    Muscle bulk and tone normal  5 out of 5 strength no motor drift  Gait normal intact  Negative Romberg  No clonus long tract signs or myelopathy    Reflexes symmetric  No edema noted and extremities skin appears normal    Straight leg raise sign absent  No signs of intrinsic hip dysfunction he has minimal signs of intrinsic hip dysfunction  Back is without any lesions or abnormality specifically no tuft he has mild kyphotic angulation  Feet are warm and well perfused        Review of Imaging/DATA:  I reviewed a MRI of his lumbar spine as well as a CT scan of his lumbar spine interestingly is autofusion at the L2-3 area he has duplicated spinal cord (diplo myeilia) and a CT scan that shows perhaps a small amount of fibrous material consistent with a Diastem, his symptomatology does likely from bilateral pars defects with intraforaminal high-grade stenosis at L5-S1  Diagnoses/Plan:    Mr. Mukherjee is a 29 y.o. male   From my standpoint he has no signs of a tethered cord, he likely is suffering from bilateral hip and leg pain secondary to intraforaminal disease and bilateral pars defects.  He is very anti-narcotics and needles but I do think it is reasonable for him to  form a relationship with an interventional pain specialist and I explained who and why was sending him.  He may be a candidate for a lumbar interbody fusion L5-S1 I expand the risk benefits and expected outcome from surgery and we discussed our plan for managing opioids he seems like a very legit gabi who is recovered and is hard-working.  He can follow-up with me in 90 days with lumbar flexion extension film and hip x-ray.

## 2019-08-27 ENCOUNTER — TELEPHONE (OUTPATIENT)
Dept: NEUROSURGERY | Facility: CLINIC | Age: 29
End: 2019-08-27

## 2019-08-27 NOTE — TELEPHONE ENCOUNTER
Provider:  Oscar  Caller: unknown  Time of call:   2:41  Phone #:  [phone]  Surgery:  no  Surgery Date:    Last visit:08/19/19     Next visit: None    YONI:         Reason for call:     A female called on patient's behalf and said that Dr. Salmeron's office is running 3-6 weeks behind.    He is requesting a new referral with someone else due to the wait time.

## 2019-09-06 ENCOUNTER — TREATMENT (OUTPATIENT)
Dept: PHYSICAL THERAPY | Facility: CLINIC | Age: 29
End: 2019-09-06

## 2019-09-06 DIAGNOSIS — Z74.09 IMPAIRED FUNCTIONAL MOBILITY, BALANCE, AND ENDURANCE: ICD-10-CM

## 2019-09-06 DIAGNOSIS — M43.16 SPONDYLOLISTHESIS OF LUMBAR REGION: ICD-10-CM

## 2019-09-06 DIAGNOSIS — M54.42 CHRONIC MIDLINE LOW BACK PAIN WITH LEFT-SIDED SCIATICA: ICD-10-CM

## 2019-09-06 DIAGNOSIS — G89.29 CHRONIC MIDLINE LOW BACK PAIN WITH LEFT-SIDED SCIATICA: ICD-10-CM

## 2019-09-06 DIAGNOSIS — M25.551 BILATERAL HIP PAIN: ICD-10-CM

## 2019-09-06 DIAGNOSIS — Q06.2 DIASTEMATOMYELIA (HCC): Primary | ICD-10-CM

## 2019-09-06 DIAGNOSIS — M25.552 BILATERAL HIP PAIN: ICD-10-CM

## 2019-09-06 PROCEDURE — 97162 PT EVAL MOD COMPLEX 30 MIN: CPT | Performed by: PHYSICAL THERAPIST

## 2019-09-06 NOTE — PATIENT INSTRUCTIONS
Pt educated on PT POC, review spine mechanics after patient questions regarding imaging results, and prone positioning for home.

## 2019-09-06 NOTE — PROGRESS NOTES
"Physical Therapy Initial Evaluation Note    Date: 2019    Patient: Bradley Mukherjee  : 1990  Medical Record #: [id number]  Referring Provider: Chandni Mckeon AP*    Visit Diagnosis/ICD-10 Code: No primary diagnosis found.  Patient seen for 1 sessions    Subjective   Functional Outcome Measure: Modified Oswestry 16%   Subjective Evaluation    History of Present Illness  Date of onset: 2017  Mechanism of injury: Patient states low back pain began about 5 years ago. He recalls he re-injured back after a fall (32 ft fall) landing directly on his back. 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 Pt states pain has progressed overall, reports he is unable to cross left leg over right leg when sitting, states it makes his leg \"not work\" and has to have help to stand. Pt states left leg is slightly weaker than R. He states he prefers to be prone, only pain free position. When sitting, he prefers leaning to R side lateral lean.    Subjective comment: Pt presents with c/o acute on chronic low back pain and bilateral hip pain.   Patient Occupation: Pt works full time as  ( hr/week). Pt enjoys playing [employer] on stomach. Pt lives in single story home, has 1 stair to enter home.  Quality of life: good    Pain  Current pain ratin  At best pain ratin  At worst pain ratin  Quality: discomfort  Relieving factors: change in position  Aggravating factors: lifting  Progression: worsening    Social Support  Lives in: one-story house  Lives with: alone    Hand dominance: right    Diagnostic Tests  X-ray: abnormal (see official report for results)  MRI studies: abnormal (see official report for results)  CT scan: abnormal (see official report for results)    Treatments  No previous or current treatments  Patient Goals  Patient goals for therapy: decreased pain             Objective   Objective     Special " Questions      Additional Special Questions  Sleeps prone without pain. Denies pain when in prone position. Denies changes to bowel/bladder.       Postural Observations  Seated posture: poor  Standing posture: fair  Correction of posture: makes symptoms worse    Additional Postural Observation Details  Seated postural awareness, most comfortable in right lateral flexed seated position. Increased pain when correct to midline or left.     Palpation     Additional Palpation Details  Denies pain to palpation along lumbar spine seated.     Neurological Testing     Sensation     Lumbar   Left   Intact: light touch    Right   Intact: light touch    Active Range of Motion     Lumbar   Flexion: 50 (midline low back pain end range) degrees   Extension: 12 (severe increase midline LBP) degrees   Left lateral flexion: 24 (pain) degrees   Right lateral flexion: 34 degrees     Additional Active Range of Motion Details  75% L rotation; 100% R WFL    Strength/Myotome Testing     Left Hip   Planes of Motion   Flexion: 4+  Extension: 3- (unable to lift LLE from mat when prone)  Abduction: 4+  Adduction: 5  External rotation: 4  Internal rotation: 4    Right Hip   Planes of Motion   Flexion: 4+  Extension: 4  Abduction: 5  Adduction: 5    Left Knee   Flexion: 4  Extension: 4+    Right Knee   Flexion: 4+  Extension: 5    Left Ankle/Foot   Dorsiflexion: 5  Plantar flexion: 4+    Right Ankle/Foot   Dorsiflexion: 5  Plantar flexion: 5    Tests     Lumbar   Positive repeated extension and repeated flexion.     Left   Positive passive SLR and valsalva.     Right   Positive passive SLR.     Additional Tests Details  Increased pain with hook lying position, increased pain with LTR       Treatment/Procedures/Modalities:   Manual Therapy: 0 Minutes  Therapeutic Exercise: 0 Minutes   Neuromuscular Re-Education: 0 Minutes   Therapeutic Activity: 0 Minutes  Gait Trainin Minutes   Moist Heat:    Ice:    E-Stim:    Ultrasound:    Ionto:    Traction:      Timed Code Treatment: 45 Minutes  Total Treatment Time: 45 Minutes    Assessment & Plan     Assessment  Impairments: abnormal or restricted ROM, activity intolerance, impaired balance, impaired physical strength and pain with function  Assessment details: Pt is a 30 yo male 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 Recent MRI and CT scan imaging revealed grade 1 anterolisthesis L5-S1, bilateral pars defect with spinal dysraphism L4-L5, large L5-S1 disc bulge, and severe left neuroforaminal stenosis. Recommend skilled PT to improve functional mobility, decrease pain, and improve activity tolerance.   Prognosis: good  Functional Limitations: lifting, moving in bed, sitting, stooping and reaching overhead  Goals  Plan Goals: Short Term Goals (2 weeks)  1. Patient is independent with HEP for flexibility and strengthening.  2.Patient to report decreased pain with sitting 30 min to 2/10 in low back.  3. Patient subjectively report that altered left leg symptoms have decreased by 50% with frequency or intensity.    Long Term Goals (4 weeks)  1. Patient will demonstrate lumbar AROM WNL in all planes to improve ability to perform daily functional activities.  2. Patient is independent with long term HEP for improved postural awareness and stabilization.  3. Modified Oswestry score is improved by at least 10%.  4.Patient will demonstrate proper lifting mechanics, utilizing abdominal stabilization, avoiding Valsalva.       Plan  Therapy options: will be seen for skilled physical therapy services  Planned modality interventions: cryotherapy, electrical stimulation/Russian stimulation, traction, thermotherapy (hydrocollator packs) and TENS  Planned therapy interventions: abdominal trunk stabilization, body mechanics training,  flexibility, functional ROM exercises, home exercise program, joint mobilization, manual therapy, neuromuscular re-education, soft tissue mobilization, spinal/joint mobilization, strengthening, stretching and therapeutic activities  Frequency: 1x week (due to patient work schedule; recommend 2x/wk)  Duration in visits: 8  Plan details: Initiate prone series spinal stabilization exercises as tolerated, along with standing hip strengthening and SLS activity exercise next visit. Give written HEP. Manual and modalities as indicated to decrease pain. F/u with patient regarding pain mgmt appt.         Progress per Plan of Care    Initial Certification  Certification Period: 12/5/2019  I certify that the therapy services are furnished while this patient is under my care.  The services outlined above are required by this patient, and will be reviewed every 90 days.    Corinne E. Perkins, PT  Physical Therapist    Please sign and return via fax to [phone].. Thank you, [facility].

## 2019-09-10 ENCOUNTER — OFFICE VISIT (OUTPATIENT)
Dept: PHYSICAL THERAPY | Facility: CLINIC | Age: 29
End: 2019-09-10

## 2019-09-10 DIAGNOSIS — Q06.2 DIASTEMATOMYELIA (HCC): Primary | ICD-10-CM

## 2019-09-10 DIAGNOSIS — G89.29 CHRONIC MIDLINE LOW BACK PAIN WITH LEFT-SIDED SCIATICA: ICD-10-CM

## 2019-09-10 DIAGNOSIS — M43.16 SPONDYLOLISTHESIS OF LUMBAR REGION: ICD-10-CM

## 2019-09-10 DIAGNOSIS — M25.551 BILATERAL HIP PAIN: ICD-10-CM

## 2019-09-10 DIAGNOSIS — M54.42 CHRONIC MIDLINE LOW BACK PAIN WITH LEFT-SIDED SCIATICA: ICD-10-CM

## 2019-09-10 DIAGNOSIS — M25.552 BILATERAL HIP PAIN: ICD-10-CM

## 2019-09-10 DIAGNOSIS — Z74.09 IMPAIRED FUNCTIONAL MOBILITY, BALANCE, AND ENDURANCE: ICD-10-CM

## 2019-09-10 PROCEDURE — 97110 THERAPEUTIC EXERCISES: CPT | Performed by: PHYSICAL THERAPIST

## 2019-09-10 NOTE — PROGRESS NOTES
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   Pain Scale (0-10): 4/10      Objective   See the Objective Evaluation flowsheet for any objective testing that may have been performed today.    Treatment/Procedures/Modalities:   Manual Therapy: 0 Minutes  Therapeutic Exercise: 38 Minutes   Neuromuscular Re-Education: 0 Minutes   Therapeutic Activity: 0 Minutes  Gait Trainin Minutes   Moist Heat:    Ice:    E-Stim:    Ultrasound:    Ionto:   Traction:      Timed Code Treatment: 38 Minutes  Total Treatment Time: 43 Minutes    Assessment / Plan:   Assessment/Plan   Patient educated on initial HEP and given written copy in clinic after demonstrating understanding. Frequent verbal cues required to avoid Valsalva and for improved postural awareness with standing and seated positions. Fatigue of RLE noted with exercises.     Progress strengthening /stabilization /functional activity. Assess response from exercises and compliance with HEP. Add further CKC position ex as tolerated. Manual and modalities for pain mgmt, pending symptoms next visit.       Corinne E. Perkins, PT  Physical Therapist

## 2019-09-10 NOTE — PATIENT INSTRUCTIONS
HEP: written HEP given to patient along with GTB including 2 way hip (abd, ext), glute set, prone UE swimmer, prone hip ext with knee ext, and deep abd breathing.

## 2019-09-26 ENCOUNTER — TREATMENT (OUTPATIENT)
Dept: PHYSICAL THERAPY | Facility: CLINIC | Age: 29
End: 2019-09-26

## 2019-09-26 DIAGNOSIS — M54.42 CHRONIC MIDLINE LOW BACK PAIN WITH LEFT-SIDED SCIATICA: ICD-10-CM

## 2019-09-26 DIAGNOSIS — G89.29 CHRONIC MIDLINE LOW BACK PAIN WITH LEFT-SIDED SCIATICA: ICD-10-CM

## 2019-09-26 DIAGNOSIS — M25.551 BILATERAL HIP PAIN: ICD-10-CM

## 2019-09-26 DIAGNOSIS — Z74.09 IMPAIRED FUNCTIONAL MOBILITY, BALANCE, AND ENDURANCE: ICD-10-CM

## 2019-09-26 DIAGNOSIS — Q06.2 DIASTEMATOMYELIA (HCC): Primary | ICD-10-CM

## 2019-09-26 DIAGNOSIS — M43.16 SPONDYLOLISTHESIS OF LUMBAR REGION: ICD-10-CM

## 2019-09-26 DIAGNOSIS — M25.552 BILATERAL HIP PAIN: ICD-10-CM

## 2019-09-26 PROCEDURE — 97110 THERAPEUTIC EXERCISES: CPT | Performed by: PHYSICAL THERAPIST

## 2019-09-26 NOTE — PATIENT INSTRUCTIONS
Reviewed current HEP and discussed increased reps. Improved deep abd breathing noted, fewer cues to avoid valsalva required.

## 2019-09-26 NOTE — PROGRESS NOTES
Physical Therapy Daily Progress Note    Date: 2019    Patient: Bradley Mukherjee  Medical Record #: [id number]  Referring Provider: Chandni Mckeon AP*    Visit Diagnosis/ICD-10 Code: Diastematomyelia (CMS/HCC) [Q06.2]  Patient seen for 3 sessions    Subjective   Patient states he has been doing his exercises and noted improvement in overall back and hip ROM, able to cross his legs without increased pain. He states he worked all night, has been in  for 10+hours.   Pain Scale (0-10): 1/10      Objective   Pt denies pain with prone position, denies radicular symptoms during exercise.   Objective     Treatment/Procedures/Modalities:   Manual Therapy: 0 Minutes  Therapeutic Exercise: 29 Minutes   Neuromuscular Re-Education: 0 Minutes   Therapeutic Activity: 0 Minutes  Gait Trainin Minutes   Moist Heat:  10  Ice:    E-Stim:    Ultrasound:    Ionto:   Traction:      Timed Code Treatment: 39 Minutes  Total Treatment Time: 39 Minutes    Assessment / Plan:   Assessment/Plan   Patient tolerated treatment well without increased LBP or radicular symptoms. Fewer cues required for upright posture and avoiding Valsalva.     Progress per Plan of Care. Reassessment next visit.       Corinne E. Perkins, PT  Physical Therapist

## 2019-10-03 ENCOUNTER — TREATMENT (OUTPATIENT)
Dept: PHYSICAL THERAPY | Facility: CLINIC | Age: 29
End: 2019-10-03

## 2019-10-03 DIAGNOSIS — M54.42 CHRONIC MIDLINE LOW BACK PAIN WITH LEFT-SIDED SCIATICA: ICD-10-CM

## 2019-10-03 DIAGNOSIS — M25.551 BILATERAL HIP PAIN: ICD-10-CM

## 2019-10-03 DIAGNOSIS — M25.552 BILATERAL HIP PAIN: ICD-10-CM

## 2019-10-03 DIAGNOSIS — G89.29 CHRONIC MIDLINE LOW BACK PAIN WITH LEFT-SIDED SCIATICA: ICD-10-CM

## 2019-10-03 DIAGNOSIS — M43.16 SPONDYLOLISTHESIS OF LUMBAR REGION: ICD-10-CM

## 2019-10-03 DIAGNOSIS — Z74.09 IMPAIRED FUNCTIONAL MOBILITY, BALANCE, AND ENDURANCE: ICD-10-CM

## 2019-10-03 DIAGNOSIS — Q06.2 DIASTEMATOMYELIA (HCC): Primary | ICD-10-CM

## 2019-10-03 PROCEDURE — 97110 THERAPEUTIC EXERCISES: CPT | Performed by: PHYSICAL THERAPIST

## 2019-10-03 NOTE — PROGRESS NOTES
Physical Therapy Re- Assessment/Certification Note    Date: 10/03/2019    Patient: Bradley Mukherjee  : 1990  Medical Record #: [id number]  Referring Provider: Chandni Mckeon AP*    Visit Diagnosis/ICD-10 Code: Diastematomyelia (CMS/HCC) [Q06.2]  Patient seen for 4 sessions    Subjective   Patient states his low back pain continues to improve overall. He states he is able to cross his legs without the numbness symptoms quickly appearing.     Pain Scale (0-10): 3/10   Functional Outcome Measure: Oswestry: 10%  Clinical Progress: improved  Home Program Compliance: Yes  Treatment has included: therapeutic exercise    Objective     Special Questions      Additional Special Questions  Sleeps prone. Denies changes to bowel/bladder.       Postural Observations  Seated posture: fair  Standing posture: fair        Palpation     Additional Palpation Details  Denies pain to palpation along lumbar spine seated. Mild increase low back pain with PA glides to lower lumbar spine. No change with sacral springing.     Neurological Testing     Sensation     Lumbar   Left   Intact: light touch    Right   Intact: light touch    Active Range of Motion     Lumbar   Flexion: 61 (midline low back pain end range) degrees   Extension: 15 (severe increase midline LBP) degrees   Left lateral flexion: 24 (pain) degrees   Right lateral flexion: 34 degrees     Additional Active Range of Motion Details  100% AROM L rotation with pain; 100% R WFL    Strength/Myotome Testing     Left Hip   Planes of Motion   Flexion: 4+  Extension: 3- (unable to lift LLE from mat when prone)  Abduction: 4+  Adduction: 5    Right Hip   Planes of Motion   Flexion: 4+  Extension: 4  Abduction: 5  Adduction: 5    Left Knee   Flexion: 4+  Extension: 4+    Right Knee   Flexion: 4+  Extension: 5    Left Ankle/Foot   Dorsiflexion: 5  Plantar flexion: 5    Right Ankle/Foot   Dorsiflexion: 5  Plantar flexion: 5    Tests     Lumbar   Positive repeated extension  and repeated flexion.     Left   Positive passive SLR and valsalva.     Right   Positive passive SLR.     Additional Tests Details  Increased pain with hook lying position, increased pain with LTR.       Treatment/Procedures/Modalities:   Manual Therapy: 0 Minutes  Therapeutic Exercise: 31 Minutes   Neuromuscular Re-Education: 0 Minutes   Therapeutic Activity: 0 Minutes  Gait Trainin Minutes   Moist Heat:    Ice:    E-Stim:    Ultrasound:    Ionto:   Traction:      Timed Code Treatment: 32 Minutes  Total Treatment Time: 32 Minutes    Goals:   Progress toward previous goals: Partially Met  Recommendations: Continue as planned  Timeframe: 1 month  Prognosis to achieve goals: good    Assessment & Plan     Assessment  Impairments: abnormal or restricted ROM, activity intolerance, impaired physical strength and pain with function  Assessment details: Pt is making steady progress towards his functional goals, demonstrates improved lumbar AROM, verbalized decreased frequency of radicular symptoms, and improved LE strength functionally. He continues to get relief with prone position, progressing with spinal stabilization; however continues to have low back pain, intermittent LLE symptoms and weakness.   Prognosis: good  Functional Limitations: lifting, moving in bed, sitting and stooping  Goals  Plan Goals: Short Term Goals (2 weeks)  1. Patient is independent with HEP for flexibility and strengthening.   Met  2.Patient to report decreased pain with sitting 30 min to 2/10 in low back.   Met  3. Patient subjectively report that altered left leg symptoms have decreased by 50% with frequency or intensity.   Met    Long Term Goals (4 weeks)  1. Patient will demonstrate lumbar AROM WNL in all planes to improve ability to perform daily functional activities.  Ongoing   2. Patient is independent with long term HEP for improved postural awareness and stabilization.   Ongoing  3. Modified Oswestry score is improved by at least  10%.  Progressing, ongoing  4.Patient will demonstrate proper lifting mechanics, utilizing abdominal stabilization, avoiding Valsalva.   Ongoing      Plan  Planned modality interventions: cryotherapy, electrical stimulation/Russian stimulation, traction, thermotherapy (hydrocollator packs) and TENS  Planned therapy interventions: abdominal trunk stabilization, body mechanics training, flexibility, functional ROM exercises, home exercise program, joint mobilization, manual therapy, neuromuscular re-education, soft tissue mobilization, spinal/joint mobilization, strengthening, stretching and therapeutic activities  Frequency: 1x week (due to patient work schedule; recommend 2x/wk)  Duration in visits: 4  Plan details: Recommend continued skilled PT 1x/week to further progress spinal stabilization and LE strengthening to avoid surgery per patient goal. Added side stepping and zig zag stepping the HEP with BTB.         Progress strengthening /stabilization /functional activity. Assess compliance with HEP progression. Could trial bird dogs next visit.     I certify that the therapy services are furnished while this patient is under my care.  The services outlined above are required by this patient, and will be reviewed every 90 days.    Corinne E. Perkins, PT  Physical Therapist    Please sign and return via fax to [phone].. Thank you, [facility].

## 2019-10-15 ENCOUNTER — OFFICE VISIT (OUTPATIENT)
Dept: INTERNAL MEDICINE | Facility: CLINIC | Age: 29
End: 2019-10-15

## 2019-10-15 VITALS
DIASTOLIC BLOOD PRESSURE: 96 MMHG | SYSTOLIC BLOOD PRESSURE: 122 MMHG | WEIGHT: 221 LBS | HEART RATE: 102 BPM | BODY MASS INDEX: 32.73 KG/M2 | RESPIRATION RATE: 18 BRPM | HEIGHT: 69 IN | OXYGEN SATURATION: 98 % | TEMPERATURE: 97.7 F

## 2019-10-15 DIAGNOSIS — H66.91 RIGHT OTITIS MEDIA, UNSPECIFIED OTITIS MEDIA TYPE: ICD-10-CM

## 2019-10-15 DIAGNOSIS — J01.00 ACUTE MAXILLARY SINUSITIS, RECURRENCE NOT SPECIFIED: Primary | ICD-10-CM

## 2019-10-15 PROCEDURE — 99213 OFFICE O/P EST LOW 20 MIN: CPT | Performed by: NURSE PRACTITIONER

## 2019-10-15 RX ORDER — MELOXICAM 15 MG/1
15 TABLET ORAL DAILY
COMMUNITY
End: 2020-01-07

## 2019-10-15 RX ORDER — AMOXICILLIN AND CLAVULANATE POTASSIUM 875; 125 MG/1; MG/1
1 TABLET, FILM COATED ORAL 2 TIMES DAILY
Qty: 20 TABLET | Refills: 0 | OUTPATIENT
Start: 2019-10-15 | End: 2019-12-04

## 2019-10-15 RX ORDER — BROMPHENIRAMINE MALEATE, PSEUDOEPHEDRINE HYDROCHLORIDE, AND DEXTROMETHORPHAN HYDROBROMIDE 2; 30; 10 MG/5ML; MG/5ML; MG/5ML
10 SYRUP ORAL 4 TIMES DAILY PRN
Qty: 300 ML | Refills: 0 | OUTPATIENT
Start: 2019-10-15 | End: 2019-12-04

## 2019-10-15 NOTE — PROGRESS NOTES
Subjective   Bradley Mukherjee is a 29 y.o. male    Chief Complaint   Patient presents with   • URI     sx's for 3 weeks. Chest congestion, cough, drainage, nasal congestion.      URI    This is a new problem. The current episode started in the past 7 days. The problem has been gradually worsening. There has been no fever. Associated symptoms include congestion, coughing, dysuria, headaches, a plugged ear sensation, rhinorrhea, sinus pain and a sore throat. Pertinent negatives include no abdominal pain, chest pain, diarrhea, ear pain, joint pain, joint swelling, nausea, neck pain, rash, sneezing, swollen glands, vomiting or wheezing. He has tried nothing for the symptoms. The treatment provided no relief.        The following portions of the patient's history were reviewed and updated as appropriate: allergies, current medications, past family history, past medical history, past social history, past surgical history and problem list.    Current Outpatient Medications:   •  meloxicam (MOBIC) 15 MG tablet, Take 15 mg by mouth Daily., Disp: , Rfl:   •  amoxicillin-clavulanate (AUGMENTIN) 875-125 MG per tablet, Take 1 tablet by mouth 2 (Two) Times a Day., Disp: 20 tablet, Rfl: 0  •  brompheniramine-pseudoephedrine-DM 30-2-10 MG/5ML syrup, Take 10 mL by mouth 4 (Four) Times a Day As Needed for Congestion or Cough., Disp: 300 mL, Rfl: 0     Review of Systems   Constitutional: Negative for chills, fatigue and fever.   HENT: Positive for congestion, rhinorrhea, sinus pain and sore throat. Negative for ear pain and sneezing.    Respiratory: Positive for cough. Negative for chest tightness, shortness of breath and wheezing.    Cardiovascular: Negative for chest pain.   Gastrointestinal: Negative for abdominal pain, diarrhea, nausea and vomiting.   Endocrine: Negative for cold intolerance and heat intolerance.   Genitourinary: Positive for dysuria.   Musculoskeletal: Negative for arthralgias, joint pain and neck pain.  "  Skin: Negative for rash.   Neurological: Positive for headaches. Negative for dizziness.       Objective   Physical Exam   Constitutional: He is oriented to person, place, and time. He appears well-developed and well-nourished.   HENT:   Head: Normocephalic and atraumatic.   Right Ear: Tympanic membrane is not erythematous. A middle ear effusion is present.   Left Ear: Tympanic membrane is erythematous. A middle ear effusion is present.   Nose: Right sinus exhibits maxillary sinus tenderness and frontal sinus tenderness. Left sinus exhibits maxillary sinus tenderness and frontal sinus tenderness.   Mouth/Throat: Posterior oropharyngeal erythema present.   Eyes: Conjunctivae and EOM are normal. Pupils are equal, round, and reactive to light.   Neck: Normal range of motion.   Cardiovascular: Normal rate, regular rhythm and normal heart sounds.   Pulmonary/Chest: Effort normal and breath sounds normal.   Abdominal: Soft. Bowel sounds are normal.   Musculoskeletal: Normal range of motion.   Neurological: He is alert and oriented to person, place, and time. He has normal reflexes.   Skin: Skin is warm and dry.   Psychiatric: He has a normal mood and affect. His behavior is normal. Judgment and thought content normal.     Vitals:    10/15/19 1439   BP: 122/96   Pulse: 102   Resp: 18   Temp: 97.7 °F (36.5 °C)   TempSrc: Temporal   SpO2: 98%   Weight: 100 kg (221 lb)   Height: 175.3 cm (69.02\")         Assessment/Plan   Bradley was seen today for uri.    Diagnoses and all orders for this visit:    Acute maxillary sinusitis, recurrence not specified  -     amoxicillin-clavulanate (AUGMENTIN) 875-125 MG per tablet; Take 1 tablet by mouth 2 (Two) Times a Day.  -     brompheniramine-pseudoephedrine-DM 30-2-10 MG/5ML syrup; Take 10 mL by mouth 4 (Four) Times a Day As Needed for Congestion or Cough.    Right otitis media, unspecified otitis media type  -     amoxicillin-clavulanate (AUGMENTIN) 875-125 MG per tablet; Take 1 " tablet by mouth 2 (Two) Times a Day.      Meds as directed  Increase fluids  RTC if sx's worsen or do not improve

## 2019-12-04 ENCOUNTER — DOCUMENTATION (OUTPATIENT)
Dept: PHYSICAL THERAPY | Facility: CLINIC | Age: 29
End: 2019-12-04

## 2019-12-04 DIAGNOSIS — M25.551 BILATERAL HIP PAIN: ICD-10-CM

## 2019-12-04 DIAGNOSIS — Z74.09 IMPAIRED FUNCTIONAL MOBILITY, BALANCE, AND ENDURANCE: ICD-10-CM

## 2019-12-04 DIAGNOSIS — M43.16 SPONDYLOLISTHESIS OF LUMBAR REGION: ICD-10-CM

## 2019-12-04 DIAGNOSIS — M25.552 BILATERAL HIP PAIN: ICD-10-CM

## 2019-12-04 DIAGNOSIS — Q06.2 DIASTEMATOMYELIA (HCC): Primary | ICD-10-CM

## 2019-12-04 DIAGNOSIS — M54.42 CHRONIC MIDLINE LOW BACK PAIN WITH LEFT-SIDED SCIATICA: ICD-10-CM

## 2019-12-04 DIAGNOSIS — G89.29 CHRONIC MIDLINE LOW BACK PAIN WITH LEFT-SIDED SCIATICA: ICD-10-CM

## 2019-12-04 NOTE — PROGRESS NOTES
Physical Therapy Discharge Summary Note    Date: 2019    Patient: Bradley Mukherjee  : 1990  Medical Record #: [id number]  Referring Provider: No ref. provider found    Visit Discharge Diagnosis/ICD-10 Code: Diastematomyelia (CMS/HCC) [Q06.2]  Patient seen for Visit count could not be calculated. Make sure you are using a visit which is associated with an episode. sessions    No notes on file      Assessment: Patient did not f/u with therapy as expected, will be d/c from [facility]PPT to home with HEP.     Goals:   Past goals: Partially Met    Discharge Reason: Patient non-compliant with treatment plan or exercise program      Plan of Care: Continue with current home exercise program as instructed      Corinne E. Perkins, PT  Physical Therapist

## 2020-01-07 ENCOUNTER — OFFICE VISIT (OUTPATIENT)
Dept: INTERNAL MEDICINE | Facility: CLINIC | Age: 30
End: 2020-01-07

## 2020-01-07 VITALS
HEIGHT: 69 IN | RESPIRATION RATE: 16 BRPM | HEART RATE: 91 BPM | TEMPERATURE: 97.8 F | BODY MASS INDEX: 33.8 KG/M2 | WEIGHT: 228.2 LBS | OXYGEN SATURATION: 99 % | SYSTOLIC BLOOD PRESSURE: 132 MMHG | DIASTOLIC BLOOD PRESSURE: 90 MMHG

## 2020-01-07 DIAGNOSIS — F33.1 MODERATE EPISODE OF RECURRENT MAJOR DEPRESSIVE DISORDER (HCC): ICD-10-CM

## 2020-01-07 DIAGNOSIS — F41.9 ANXIETY: Primary | ICD-10-CM

## 2020-01-07 PROCEDURE — 99214 OFFICE O/P EST MOD 30 MIN: CPT | Performed by: NURSE PRACTITIONER

## 2020-01-07 RX ORDER — FLUOXETINE HYDROCHLORIDE 20 MG/1
20 CAPSULE ORAL DAILY
Qty: 30 CAPSULE | Refills: 2 | Status: SHIPPED | OUTPATIENT
Start: 2020-01-07 | End: 2020-02-04

## 2020-01-07 NOTE — PROGRESS NOTES
Subjective   JOHNSON Mukherjee is a 29 y.o. male    Chief Complaint   Patient presents with   • Anxiety and depression     Possible Bipolar or BPD     Anxiety   Presents for initial visit. Onset was at an unknown time. The problem has been gradually worsening. Symptoms include decreased concentration, depressed mood, insomnia, irritability, nervous/anxious behavior and restlessness. Patient reports no chest pain, compulsions, confusion, dizziness, dry mouth, excessive worry, feeling of choking, hyperventilation, impotence, malaise, muscle tension, nausea, obsessions, palpitations, panic, shortness of breath or suicidal ideas. Symptoms occur constantly. The severity of symptoms is interfering with daily activities. Nothing aggravates the symptoms. The quality of sleep is poor. Nighttime awakenings: several.     His past medical history is significant for anxiety/panic attacks and depression. There is no history of anemia, arrhythmia, asthma, bipolar disorder, CAD, CHF, chronic lung disease, fibromyalgia, hyperthyroidism or suicide attempts. Past treatments include nothing.   Depression   Visit Type: initial  Onset of symptoms: at an unknown time  Progression since onset: gradually worsening  Patient presents with the following symptoms: decreased concentration, depressed mood, insomnia, irritability, nervousness/anxiety and restlessness.  Patient is not experiencing: anhedonia, chest pain, choking sensation, compulsions, confusion, dizziness, dry mouth, excessive worry, fatigue, feelings of hopelessness, feelings of worthlessness, hypersomnia, hyperventilation, impotence, malaise, memory impairment, muscle tension, nausea, obsessions, palpitations, panic, psychomotor agitation, psychomotor retardation, shortness of breath, suicidal ideas, suicidal planning, thoughts of death, weight gain and weight loss.  Frequency of symptoms: most days   Severity: interfering with daily activities   Aggravated by: caffeine  Sleep  quality: poor  Nighttime awakenings: several  Patient has a history of: anxiety/panic attacks  No history of: anemia, arrhythmia, asthma, bipolar disorder, CAD, CHF, chronic lung disease, depression, fibromyalgia, hyperthyroidism, suicide attempt, mental illness and substance abuse  Treatment tried: nothing               The following portions of the patient's history were reviewed and updated as appropriate: allergies, current medications, past family history, past medical history, past social history, past surgical history and problem list.    Current Outpatient Medications:   •  FLUoxetine (PROzac) 20 MG capsule, Take 1 capsule by mouth Daily for 90 days., Disp: 30 capsule, Rfl: 2     Review of Systems   Constitutional: Positive for irritability. Negative for chills, fatigue, fever, weight gain and weight loss.   Respiratory: Negative for cough, choking, chest tightness and shortness of breath.    Cardiovascular: Negative for chest pain and palpitations.   Gastrointestinal: Negative for abdominal pain, diarrhea, nausea and vomiting.   Endocrine: Negative for cold intolerance and heat intolerance.   Genitourinary: Negative for impotence.   Musculoskeletal: Negative for arthralgias.   Neurological: Negative for dizziness.   Psychiatric/Behavioral: Positive for decreased concentration. Negative for confusion, substance abuse and suicidal ideas. The patient is nervous/anxious and has insomnia.        Objective   Physical Exam   Constitutional: He is oriented to person, place, and time. He appears well-developed and well-nourished.   HENT:   Head: Normocephalic and atraumatic.   Eyes: Pupils are equal, round, and reactive to light. Conjunctivae and EOM are normal.   Neck: Normal range of motion.   Cardiovascular: Normal rate, regular rhythm and normal heart sounds.   Pulmonary/Chest: Effort normal and breath sounds normal.   Abdominal: Soft. Bowel sounds are normal.   Musculoskeletal: Normal range of motion.  "  Neurological: He is alert and oriented to person, place, and time. He has normal reflexes.   Skin: Skin is warm and dry.   Psychiatric: He has a normal mood and affect. His behavior is normal. Judgment and thought content normal.     Vitals:    01/07/20 1148   BP: 132/90   Pulse: 91   Resp: 16   Temp: 97.8 °F (36.6 °C)   TempSrc: Temporal   SpO2: 99%   Weight: 104 kg (228 lb 3.2 oz)   Height: 175.3 cm (69.02\")     PHQ-9 Depression Screening  Little interest or pleasure in doing things? 0   Feeling down, depressed, or hopeless? 2   Trouble falling or staying asleep, or sleeping too much? 3   Feeling tired or having little energy? 1   Poor appetite or overeating? 3   Feeling bad about yourself - or that you are a failure or have let yourself or your family down? 3   Trouble concentrating on things, such as reading the newspaper or watching television? 3   Moving or speaking so slowly that other people could have noticed? Or the opposite - being so fidgety or restless that you have been moving around a lot more than usual? 3   Thoughts that you would be better off dead, or of hurting yourself in some way? 1   PHQ-9 Total Score 19   If you checked off any problems, how difficult have these problems made it for you to do your work, take care of things at home, or get along with other people? Very difficult         Assessment/Plan   JOHNSON LEDEZMA was seen today for anxiety and depression.    Diagnoses and all orders for this visit:    Anxiety  -     Cancel: AST  -     Cancel: Basic metabolic panel  -     Cancel: CBC  -     Cancel: Comprehensive metabolic panel  -     Cancel: Folate RBC  -     Cancel: Sedimentation rate, automated  -     Cancel: T4, free  -     Cancel: TSH  -     Cancel: Vitamin B12 level  -     FLUoxetine (PROzac) 20 MG capsule; Take 1 capsule by mouth Daily for 90 days.  -     Cancel: ALT  -     ALT  -     Vitamin B12  -     TSH  -     T4, Free  -     Sedimentation Rate  -     Folate RBC  -     " Comprehensive Metabolic Panel  -     CBC (No Diff)  -     Basic Metabolic Panel  -     AST    Moderate episode of recurrent major depressive disorder (CMS/HCC)  -     Cancel: AST  -     Cancel: Basic metabolic panel  -     Cancel: CBC  -     Cancel: Comprehensive metabolic panel  -     Cancel: Folate RBC  -     Cancel: Sedimentation rate, automated  -     Cancel: T4, free  -     Cancel: TSH  -     Cancel: Vitamin B12 level  -     FLUoxetine (PROzac) 20 MG capsule; Take 1 capsule by mouth Daily for 90 days.  -     Cancel: ALT  -     ALT  -     Vitamin B12  -     TSH  -     T4, Free  -     Sedimentation Rate  -     Folate RBC  -     Comprehensive Metabolic Panel  -     CBC (No Diff)  -     Basic Metabolic Panel  -     AST      Labs sent to r/o metabolic cause  Will start on Prozac 20 mg daily  SE's discussed  Return in about 4 weeks (around 2/4/2020).

## 2020-01-07 NOTE — PATIENT INSTRUCTIONS
Bipolar 1 Disorder  Bipolar 1 disorder is a mental health disorder in which a person has episodes of emotional highs (jose), and may also have episodes of emotional lows (depression) in addition to highs. Bipolar 1 disorder is different from other bipolar disorders because it involves extreme manic episodes. These episodes last at least one week or involve symptoms that are so severe that hospitalization is needed to keep the person safe.  What increases the risk?  The cause of this condition is not known. However, certain factors make you more likely to have bipolar disorder, such as:  · Having a family member with the disorder.  · An imbalance of certain chemicals in the brain (neurotransmitters).  · Stress, such as illness, financial problems, or a death.  · Certain conditions that affect the brain or spinal cord (neurologic conditions).  · Brain injury (trauma).  · Having another mental health disorder, such as:  ? Obsessive compulsive disorder.  ? Schizophrenia.  What are the signs or symptoms?  Symptoms of jose include:  · Very high self-esteem or self-confidence.  · Decreased need for sleep.  · Unusual talkativeness or feeling a need to keep talking. Speech may be very fast. It may seem like you cannot stop talking.  · Racing thoughts or constant talking, with quick shifts between topics that may or may not be related (flight of ideas).  · Decreased ability to focus or concentrate.  · Increased purposeful activity, such as work, studies, or social activity.  · Increased nonproductive activity. This could be pacing, squirming and fidgeting, or finger and toe tapping.  · Impulsive behavior and poor judgment. This may result in high-risk activities, such as having unprotected sex or spending a lot of money.  Symptoms of depression include:  · Feeling sad, hopeless, or helpless.  · Frequent or uncontrollable crying.  · Lack of feeling or caring about anything.  · Sleeping too much.  · Moving more slowly  than usual.  · Not being able to enjoy things you used to enjoy.  · Wanting to be alone all the time.  · Feeling guilty or worthless.  · Lack of energy or motivation.  · Trouble concentrating or remembering.  · Trouble making decisions.  · Increased appetite.  · Thoughts of death, or the desire to harm yourself.  Sometimes, you may have a mixed mood. This means having symptoms of depression and jose. Stress can make symptoms worse.  How is this diagnosed?  To diagnose bipolar disorder, your health care provider may ask about your:  · Emotional episodes.  · Medical history.  · Alcohol and drug use. This includes prescription medicines. Certain medical conditions and substances can cause symptoms that seem like bipolar disorder (secondary bipolar disorder).  How is this treated?  Bipolar disorder is a long-term (chronic) illness. It is best controlled with ongoing (continuous) treatment rather than treatment only when symptoms occur. Treatment may include:  · Medicine. Medicine can be prescribed by a provider who specializes in treating mental disorders (psychiatrist).  ? Medicines called mood stabilizers are usually prescribed.  ? If symptoms occur even while taking a mood stabilizer, other medicines may be added.  · Psychotherapy. Some forms of talk therapy, such as cognitive-behavioral therapy (CBT), can provide support, education, and guidance.  · Coping methods, such as journaling or relaxation exercises. These may include:  ? Yoga.  ? Meditation.  ? Deep breathing.  · Lifestyle changes, such as:  ? Limiting alcohol and drug use.  ? Exercising regularly.  ? Getting plenty of sleep.  ? Making healthy eating choices.    A combination of medicine, talk therapy, and coping methods is best. A procedure in which electricity is applied to the brain through the scalp (electroconvulsive therapy) may be used in cases of severe jose when medicine and psychotherapy work too slowly or do not work.  Follow these instructions  at home:  Activity    · Return to your normal activities as told by your health care provider.  · Find activities that you enjoy, and make time to do them.  · Exercise regularly as told by your health care provider.  Lifestyle  · Limit alcohol intake to no more than 1 drink a day for nonpregnant women and 2 drinks a day for men. One drink equals 12 oz of beer, 5 oz of wine, or 1½ oz of hard liquor.  · Follow a set schedule for eating and sleeping.  · Eat a balanced diet that includes fresh fruits and vegetables, whole grains, low-fat dairy, and lean meat.  · Get 7-8 hours of sleep each night.  General instructions  · Take over-the-counter and prescription medicines only as told by your health care provider.  · Think about joining a support group. Your health care provider may be able to recommend a support group.  · Talk with your family and loved ones about your treatment goals and how they can help.  · Keep all follow-up visits as told by your health care provider. This is important.  Where to find more information  For more information about bipolar disorder, visit the following websites:  · National [place] [employer] Mental Illness: www.deepak.org  · [place] [employer] [place] [employer]: www.nimh.nih.gov  Contact a health care provider if:  · Your symptoms get worse.  · You have side effects from your medicine, and they get worse.  · You have trouble sleeping.  · You have trouble doing daily activities.  · You feel unsafe in your surroundings.  · You are dealing with substance abuse.  Get help right away if:  · You have new symptoms.  · You have thoughts about harming yourself.  · You self-harm.  This information is not intended to replace advice given to you by your health care provider. Make sure you discuss any questions you have with your health care provider.  Document Released: 03/26/2002 Document Revised: 08/13/2017 Document Reviewed: 08/17/2017  Elsevier Interactive Patient Education © 2019 [employer]

## 2020-01-08 LAB
ALBUMIN SERPL-MCNC: 4.2 G/DL (ref 3.5–5.5)
ALBUMIN/GLOB SERPL: 1.8 {RATIO} (ref 1.2–2.2)
ALP SERPL-CCNC: 105 IU/L (ref 39–117)
ALT SERPL-CCNC: 64 IU/L (ref 0–44)
AST SERPL-CCNC: 23 IU/L (ref 0–40)
BILIRUB SERPL-MCNC: 0.3 MG/DL (ref 0–1.2)
BUN SERPL-MCNC: 12 MG/DL (ref 6–20)
BUN/CREAT SERPL: 12 (ref 9–20)
CALCIUM SERPL-MCNC: 9 MG/DL (ref 8.7–10.2)
CHLORIDE SERPL-SCNC: 102 MMOL/L (ref 96–106)
CO2 SERPL-SCNC: 24 MMOL/L (ref 20–29)
CREAT SERPL-MCNC: 0.98 MG/DL (ref 0.76–1.27)
ERYTHROCYTE [DISTWIDTH] IN BLOOD BY AUTOMATED COUNT: 12.6 % (ref 11.6–15.4)
ERYTHROCYTE [SEDIMENTATION RATE] IN BLOOD BY WESTERGREN METHOD: 5 MM/HR (ref 0–15)
FOLATE BLD-MCNC: 402 NG/ML
FOLATE RBC-MCNC: 863 NG/ML
GLOBULIN SER CALC-MCNC: 2.3 G/DL (ref 1.5–4.5)
GLUCOSE SERPL-MCNC: 131 MG/DL (ref 65–99)
HCT VFR BLD AUTO: 46.6 % (ref 37.5–51)
HGB BLD-MCNC: 16.6 G/DL (ref 13–17.7)
MCH RBC QN AUTO: 32.2 PG (ref 26.6–33)
MCHC RBC AUTO-ENTMCNC: 35.6 G/DL (ref 31.5–35.7)
MCV RBC AUTO: 90 FL (ref 79–97)
PLATELET # BLD AUTO: 219 X10E3/UL (ref 150–450)
POTASSIUM SERPL-SCNC: 4.6 MMOL/L (ref 3.5–5.2)
PROT SERPL-MCNC: 6.5 G/DL (ref 6–8.5)
RBC # BLD AUTO: 5.16 X10E6/UL (ref 4.14–5.8)
SODIUM SERPL-SCNC: 139 MMOL/L (ref 134–144)
T4 FREE SERPL-MCNC: 1.01 NG/DL (ref 0.82–1.77)
TSH SERPL DL<=0.005 MIU/L-ACNC: 1.34 UIU/ML (ref 0.45–4.5)
VIT B12 SERPL-MCNC: 530 PG/ML (ref 232–1245)
WBC # BLD AUTO: 6.4 X10E3/UL (ref 3.4–10.8)

## 2020-02-04 ENCOUNTER — OFFICE VISIT (OUTPATIENT)
Dept: INTERNAL MEDICINE | Facility: CLINIC | Age: 30
End: 2020-02-04

## 2020-02-04 VITALS
BODY MASS INDEX: 32.73 KG/M2 | TEMPERATURE: 97.6 F | SYSTOLIC BLOOD PRESSURE: 136 MMHG | DIASTOLIC BLOOD PRESSURE: 90 MMHG | HEIGHT: 69 IN | WEIGHT: 221 LBS | HEART RATE: 81 BPM | OXYGEN SATURATION: 99 % | RESPIRATION RATE: 16 BRPM

## 2020-02-04 DIAGNOSIS — F41.9 ANXIETY: Primary | ICD-10-CM

## 2020-02-04 DIAGNOSIS — F33.1 MODERATE EPISODE OF RECURRENT MAJOR DEPRESSIVE DISORDER (HCC): ICD-10-CM

## 2020-02-04 DIAGNOSIS — R73.09 ELEVATED GLUCOSE: ICD-10-CM

## 2020-02-04 DIAGNOSIS — R74.8 ELEVATED LIVER ENZYMES: ICD-10-CM

## 2020-02-04 PROCEDURE — 99214 OFFICE O/P EST MOD 30 MIN: CPT | Performed by: NURSE PRACTITIONER

## 2020-02-04 RX ORDER — FLUOXETINE HYDROCHLORIDE 40 MG/1
40 CAPSULE ORAL DAILY
Qty: 30 CAPSULE | Refills: 5 | Status: SHIPPED | OUTPATIENT
Start: 2020-02-04 | End: 2020-02-21 | Stop reason: SDUPTHER

## 2020-02-04 NOTE — PROGRESS NOTES
Subjective   Bradley Mukherjee is a 29 y.o. male    Chief Complaint   Patient presents with   • 4 week follow up   • Anxiety and depression     States he hasn't felt much different. Feels like since starting the Prozac, he has had more difficulty sleeping and lack of sex drive.      Here for f/u on anxiety and depression after starting Prozac 20 mg at last visit    Anxiety   Presents for follow-up visit. Symptoms include decreased concentration, depressed mood, excessive worry, insomnia, irritability and nervous/anxious behavior. Patient reports no chest pain, compulsions, confusion, dizziness, dry mouth, feeling of choking, hyperventilation, impotence, malaise, muscle tension, nausea, obsessions, palpitations, panic, restlessness, shortness of breath or suicidal ideas. Symptoms occur most days. The severity of symptoms is moderate. The quality of sleep is poor. Nighttime awakenings: occasional.     His past medical history is significant for depression. Compliance with medications is %. Treatment side effects: slighlty decreased sex drive.   Depression   Visit Type: follow-up  Patient presents with the following symptoms: decreased concentration, depressed mood, excessive worry, fatigue, insomnia, irritability and nervousness/anxiety.  Patient is not experiencing: anhedonia, chest pain, choking sensation, compulsions, confusion, dizziness, dry mouth, feelings of hopelessness, feelings of worthlessness, hypersomnia, hyperventilation, impotence, malaise, memory impairment, muscle tension, nausea, obsessions, palpitations, panic, psychomotor agitation, psychomotor retardation, restlessness, shortness of breath, suicidal ideas, suicidal planning, thoughts of death, weight gain and weight loss.  Frequency of symptoms: most days   Severity: moderate   Sleep quality: non-restorative  Nighttime awakenings: occasional  Compliance with medications:  %  Treatment side effects: slightly decreased sex drive.      Labs were sent at last visit.  Liver enzymes and BG were elevated.      The following portions of the patient's history were reviewed and updated as appropriate: allergies, current medications, past family history, past medical history, past social history, past surgical history and problem list.    Current Outpatient Medications:   •  FLUoxetine (PROZAC) 40 MG capsule, Take 1 capsule by mouth Daily., Disp: 30 capsule, Rfl: 5     Review of Systems   Constitutional: Positive for irritability. Negative for chills, fatigue, fever, weight gain and weight loss.   Respiratory: Negative for cough, choking, chest tightness and shortness of breath.    Cardiovascular: Negative for chest pain and palpitations.   Gastrointestinal: Negative for abdominal pain, diarrhea, nausea and vomiting.   Endocrine: Negative for cold intolerance and heat intolerance.   Genitourinary: Negative for impotence.   Musculoskeletal: Negative for arthralgias.   Neurological: Negative for dizziness.   Psychiatric/Behavioral: Positive for decreased concentration. Negative for confusion and suicidal ideas. The patient is nervous/anxious and has insomnia.        Objective   Physical Exam   Constitutional: He is oriented to person, place, and time. He appears well-developed and well-nourished.   HENT:   Head: Normocephalic and atraumatic.   Eyes: Pupils are equal, round, and reactive to light. Conjunctivae and EOM are normal.   Neck: Normal range of motion.   Cardiovascular: Normal rate, regular rhythm and normal heart sounds.   Pulmonary/Chest: Effort normal and breath sounds normal.   Abdominal: Soft. Bowel sounds are normal.   Musculoskeletal: Normal range of motion.   Neurological: He is alert and oriented to person, place, and time. He has normal reflexes.   Skin: Skin is warm and dry.   Psychiatric: His behavior is normal. Judgment and thought content normal. His mood appears anxious. He exhibits a depressed mood.     Vitals:    02/04/20 1129  "  BP: 136/90   Pulse: 81   Resp: 16   Temp: 97.6 °F (36.4 °C)   TempSrc: Temporal   SpO2: 99%   Weight: 100 kg (221 lb)   Height: 175.3 cm (69.02\")         Assessment/Plan   Bradley was seen today for 4 week follow up and anxiety and depression.    Diagnoses and all orders for this visit:    Anxiety  -     Hemoglobin A1c  -     Basic Metabolic Panel  -     Hepatic Function Panel  -     Hepatitis Panel, Acute  -     FLUoxetine (PROZAC) 40 MG capsule; Take 1 capsule by mouth Daily.    Moderate episode of recurrent major depressive disorder (CMS/HCC)  -     Hemoglobin A1c  -     Basic Metabolic Panel  -     Hepatic Function Panel  -     Hepatitis Panel, Acute  -     FLUoxetine (PROZAC) 40 MG capsule; Take 1 capsule by mouth Daily.    Elevated liver enzymes  -     Hemoglobin A1c  -     Basic Metabolic Panel  -     Hepatic Function Panel  -     Hepatitis Panel, Acute    Elevated glucose  -     Hemoglobin A1c  -     Basic Metabolic Panel  -     Hepatic Function Panel  -     Hepatitis Panel, Acute    Prozac increased to 40 mg daily  Labs sent to evaluate elevated liver enzymes and BG  Return in about 6 weeks (around 3/17/2020).               "

## 2020-02-05 LAB
ALBUMIN SERPL-MCNC: 4.2 G/DL (ref 3.5–5.2)
ALP SERPL-CCNC: 128 U/L (ref 39–117)
ALT SERPL-CCNC: 71 U/L (ref 1–41)
AST SERPL-CCNC: 28 U/L (ref 1–40)
BILIRUB DIRECT SERPL-MCNC: <0.2 MG/DL (ref 0.2–0.3)
BILIRUB SERPL-MCNC: 0.3 MG/DL (ref 0.2–1.2)
BUN SERPL-MCNC: 8 MG/DL (ref 6–20)
BUN/CREAT SERPL: 8.3 (ref 7–25)
CALCIUM SERPL-MCNC: 9.2 MG/DL (ref 8.6–10.5)
CHLORIDE SERPL-SCNC: 99 MMOL/L (ref 98–107)
CO2 SERPL-SCNC: 24.7 MMOL/L (ref 22–29)
CREAT SERPL-MCNC: 0.96 MG/DL (ref 0.76–1.27)
GLUCOSE SERPL-MCNC: 107 MG/DL (ref 65–99)
HAV IGM SERPL QL IA: NEGATIVE
HBA1C MFR BLD: 6.3 % (ref 4.8–5.6)
HBV CORE IGM SERPL QL IA: NEGATIVE
HBV SURFACE AG SERPL QL IA: NEGATIVE
HCV AB S/CO SERPL IA: 0.3 S/CO RATIO (ref 0–0.9)
POTASSIUM SERPL-SCNC: 4.7 MMOL/L (ref 3.5–5.2)
PROT SERPL-MCNC: 6.9 G/DL (ref 6–8.5)
SODIUM SERPL-SCNC: 137 MMOL/L (ref 136–145)

## 2020-02-21 DIAGNOSIS — F41.9 ANXIETY: ICD-10-CM

## 2020-02-21 DIAGNOSIS — F33.1 MODERATE EPISODE OF RECURRENT MAJOR DEPRESSIVE DISORDER (HCC): ICD-10-CM

## 2020-02-21 RX ORDER — FLUOXETINE HYDROCHLORIDE 40 MG/1
40 CAPSULE ORAL DAILY
Qty: 90 CAPSULE | Refills: 1 | Status: SHIPPED | OUTPATIENT
Start: 2020-02-21 | End: 2020-10-05 | Stop reason: SDUPTHER

## 2020-04-26 PROCEDURE — U0003 INFECTIOUS AGENT DETECTION BY NUCLEIC ACID (DNA OR RNA); SEVERE ACUTE RESPIRATORY SYNDROME CORONAVIRUS 2 (SARS-COV-2) (CORONAVIRUS DISEASE [COVID-19]), AMPLIFIED PROBE TECHNIQUE, MAKING USE OF HIGH THROUGHPUT TECHNOLOGIES AS DESCRIBED BY CMS-2020-01-R: HCPCS | Performed by: NURSE PRACTITIONER

## 2020-04-29 ENCOUNTER — TELEPHONE (OUTPATIENT)
Dept: URGENT CARE | Facility: CLINIC | Age: 30
End: 2020-04-29

## 2020-04-29 NOTE — TELEPHONE ENCOUNTER
Patients called back requesting lab results . he was adviced per CDC to quarantine for 7 days from thr day his symptoms , he voice understanding

## 2020-05-19 ENCOUNTER — HOSPITAL ENCOUNTER (EMERGENCY)
Facility: HOSPITAL | Age: 30
Discharge: HOME | End: 2020-05-19
Attending: EMERGENCY MEDICINE | Admitting: EMERGENCY MEDICINE

## 2020-05-19 ENCOUNTER — APPOINTMENT (OUTPATIENT)
Dept: CT IMAGING | Facility: HOSPITAL | Age: 30
End: 2020-05-19

## 2020-05-19 VITALS
TEMPERATURE: 97.8 F | OXYGEN SATURATION: 98 % | HEIGHT: 69 IN | BODY MASS INDEX: 31.1 KG/M2 | WEIGHT: 210 LBS | SYSTOLIC BLOOD PRESSURE: 134 MMHG | DIASTOLIC BLOOD PRESSURE: 88 MMHG | HEART RATE: 85 BPM | RESPIRATION RATE: 18 BRPM

## 2020-05-19 DIAGNOSIS — K04.7 DENTAL ABSCESS: ICD-10-CM

## 2020-05-19 DIAGNOSIS — L03.211 FACIAL CELLULITIS: Primary | ICD-10-CM

## 2020-05-19 LAB
ALBUMIN SERPL-MCNC: 3.8 G/DL (ref 3.5–5.2)
ALBUMIN/GLOB SERPL: 1.5 G/DL
ALP SERPL-CCNC: 109 U/L (ref 39–117)
ALT SERPL W P-5'-P-CCNC: 53 U/L (ref 1–41)
ANION GAP SERPL CALCULATED.3IONS-SCNC: 11 MMOL/L (ref 5–15)
AST SERPL-CCNC: 27 U/L (ref 1–40)
BASOPHILS # BLD AUTO: 0.02 10*3/MM3 (ref 0–0.2)
BASOPHILS NFR BLD AUTO: 0.4 % (ref 0–1.5)
BILIRUB SERPL-MCNC: 0.3 MG/DL (ref 0.2–1.2)
BUN BLD-MCNC: 11 MG/DL (ref 6–20)
BUN/CREAT SERPL: 12.4 (ref 7–25)
CALCIUM SPEC-SCNC: 8.8 MG/DL (ref 8.6–10.5)
CHLORIDE SERPL-SCNC: 104 MMOL/L (ref 98–107)
CO2 SERPL-SCNC: 24 MMOL/L (ref 22–29)
CREAT BLD-MCNC: 0.89 MG/DL (ref 0.76–1.27)
DEPRECATED RDW RBC AUTO: 42.4 FL (ref 37–54)
EOSINOPHIL # BLD AUTO: 0.15 10*3/MM3 (ref 0–0.4)
EOSINOPHIL NFR BLD AUTO: 2.7 % (ref 0.3–6.2)
ERYTHROCYTE [DISTWIDTH] IN BLOOD BY AUTOMATED COUNT: 12.2 % (ref 12.3–15.4)
GFR SERPL CREATININE-BSD FRML MDRD: 101 ML/MIN/1.73
GLOBULIN UR ELPH-MCNC: 2.6 GM/DL
GLUCOSE BLD-MCNC: 190 MG/DL (ref 65–99)
HCT VFR BLD AUTO: 46.2 % (ref 37.5–51)
HGB BLD-MCNC: 15.5 G/DL (ref 13–17.7)
HOLD SPECIMEN: NORMAL
HOLD SPECIMEN: NORMAL
IMM GRANULOCYTES # BLD AUTO: 0.02 10*3/MM3 (ref 0–0.05)
IMM GRANULOCYTES NFR BLD AUTO: 0.4 % (ref 0–0.5)
LYMPHOCYTES # BLD AUTO: 1.43 10*3/MM3 (ref 0.7–3.1)
LYMPHOCYTES NFR BLD AUTO: 26 % (ref 19.6–45.3)
MCH RBC QN AUTO: 31.4 PG (ref 26.6–33)
MCHC RBC AUTO-ENTMCNC: 33.5 G/DL (ref 31.5–35.7)
MCV RBC AUTO: 93.7 FL (ref 79–97)
MONOCYTES # BLD AUTO: 0.56 10*3/MM3 (ref 0.1–0.9)
MONOCYTES NFR BLD AUTO: 10.2 % (ref 5–12)
NEUTROPHILS # BLD AUTO: 3.31 10*3/MM3 (ref 1.7–7)
NEUTROPHILS NFR BLD AUTO: 60.3 % (ref 42.7–76)
NRBC BLD AUTO-RTO: 0 /100 WBC (ref 0–0.2)
PLATELET # BLD AUTO: 175 10*3/MM3 (ref 140–450)
PMV BLD AUTO: 9.3 FL (ref 6–12)
POTASSIUM BLD-SCNC: 4.2 MMOL/L (ref 3.5–5.2)
PROT SERPL-MCNC: 6.4 G/DL (ref 6–8.5)
RBC # BLD AUTO: 4.93 10*6/MM3 (ref 4.14–5.8)
SODIUM BLD-SCNC: 139 MMOL/L (ref 136–145)
WBC NRBC COR # BLD: 5.49 10*3/MM3 (ref 3.4–10.8)
WHOLE BLOOD HOLD SPECIMEN: NORMAL
WHOLE BLOOD HOLD SPECIMEN: NORMAL

## 2020-05-19 PROCEDURE — 96365 THER/PROPH/DIAG IV INF INIT: CPT

## 2020-05-19 PROCEDURE — 25010000002 IOPAMIDOL 61 % SOLUTION: Performed by: EMERGENCY MEDICINE

## 2020-05-19 PROCEDURE — 99284 EMERGENCY DEPT VISIT MOD MDM: CPT

## 2020-05-19 PROCEDURE — 80053 COMPREHEN METABOLIC PANEL: CPT | Performed by: EMERGENCY MEDICINE

## 2020-05-19 PROCEDURE — 70487 CT MAXILLOFACIAL W/DYE: CPT

## 2020-05-19 PROCEDURE — 85025 COMPLETE CBC W/AUTO DIFF WBC: CPT | Performed by: EMERGENCY MEDICINE

## 2020-05-19 RX ORDER — METRONIDAZOLE 500 MG/1
500 TABLET ORAL 2 TIMES DAILY
COMMUNITY
End: 2020-05-19

## 2020-05-19 RX ORDER — IBUPROFEN 400 MG/1
400 TABLET ORAL EVERY 6 HOURS PRN
COMMUNITY
End: 2020-07-03 | Stop reason: HOSPADM

## 2020-05-19 RX ORDER — PENICILLIN V POTASSIUM 500 MG/1
500 TABLET ORAL 4 TIMES DAILY
COMMUNITY
End: 2020-05-19

## 2020-05-19 RX ORDER — CLINDAMYCIN HYDROCHLORIDE 300 MG/1
300 CAPSULE ORAL 4 TIMES DAILY
Qty: 40 CAPSULE | Refills: 0 | Status: SHIPPED | OUTPATIENT
Start: 2020-05-19 | End: 2020-07-03 | Stop reason: HOSPADM

## 2020-05-19 RX ORDER — ACETAMINOPHEN 500 MG
1000 TABLET ORAL EVERY 6 HOURS PRN
COMMUNITY
End: 2021-02-02

## 2020-05-19 RX ORDER — CLINDAMYCIN PHOSPHATE 900 MG/50ML
900 INJECTION INTRAVENOUS ONCE
Status: COMPLETED | OUTPATIENT
Start: 2020-05-19 | End: 2020-05-19

## 2020-05-19 RX ADMIN — CLINDAMYCIN IN 5 PERCENT DEXTROSE 900 MG: 18 INJECTION, SOLUTION INTRAVENOUS at 16:36

## 2020-05-19 RX ADMIN — IOPAMIDOL 75 ML: 612 INJECTION, SOLUTION INTRAVENOUS at 14:10

## 2020-05-19 NOTE — ED PROVIDER NOTES
Subjective   Mr. Mukherjee is a 29-year-old male that was seen by an emergency dentistry clinic 3 days ago started on Pen-Vee K and Flagyl.  He states that he woke up this morning having increasing swelling to the right side of his face.  States he has a little bit of redness.  States his eye was a little bit swollen shut and slightly matted this morning.  He is got a tooth that is bad on the right upper jawline.  States that he does not really would go back to this dental clinic did not seem very helpful.  He does not have an established dentist.  He states he has minimal pain just mostly swelling and was concerned about this getting worse.  He has no other complaints.  He denies fevers chills or drainage from the tooth.      History provided by:  Patient   used: No    Dental Pain   Location:  Upper  Upper teeth location:  5/RU 1st bicuspid  Quality:  Aching  Severity:  Mild  Onset quality:  Gradual  Timing:  Constant  Progression:  Worsening  Chronicity:  New  Context: abscess, dental caries and trauma    Relieved by:  Nothing  Worsened by:  Nothing  Ineffective treatments:  None tried  Associated symptoms: facial swelling    Associated symptoms: no congestion, no drooling, no facial pain, no fever, no gum swelling, no headaches and no neck pain    Associated symptoms comment:  New facial swelling      Review of Systems   Constitutional: Negative for fever.   HENT: Positive for facial swelling. Negative for congestion and drooling.    Respiratory: Negative for chest tightness and shortness of breath.    Genitourinary: Negative for dysuria, frequency and urgency.   Musculoskeletal: Negative for back pain and neck pain.   Skin: Negative for pallor and rash.   Neurological: Negative for headaches.   Psychiatric/Behavioral: Negative.    All other systems reviewed and are negative.      Past Medical History:   Diagnosis Date   • History of drug abuse (CMS/[facility])    • History of heart attack     Due to  drug over dose   • Injury of back        Allergies   Allergen Reactions   • Other Other (See Comments)     Narcotics.  Pt states no narcotics.       History reviewed. No pertinent surgical history.    Family History   Adopted: Yes   Problem Relation Age of Onset   • No Known Problems Mother        Social History     Socioeconomic History   • Marital status: Single     Spouse name: Not on file   • Number of children: Not on file   • Years of education: Not on file   • Highest education level: Not on file   Tobacco Use   • Smoking status: Current Every Day Smoker     Packs/day: 2.00     Years: 5.00     Pack years: 10.00   • Smokeless tobacco: Never Used   Substance and Sexual Activity   • Alcohol use: Yes     Comment: VERY SELDOM   • Drug use: Yes     Types: Marijuana, Cocaine     Comment: LAST DRUG USE 2012   • Sexual activity: Defer           Objective   Physical Exam   Constitutional: He is oriented to person, place, and time. He appears well-developed and well-nourished.   HENT:   Head: Normocephalic and atraumatic.   Nose: Nose normal.   Swelling to the right maxillary sinus area.   Eyes: Pupils are equal, round, and reactive to light. Conjunctivae and EOM are normal. No scleral icterus.   Neck: Normal range of motion. No thyromegaly present.   Cardiovascular: Normal rate, regular rhythm and normal heart sounds.   Pulmonary/Chest: Effort normal and breath sounds normal. No respiratory distress. He has no wheezes. He has no rales. He exhibits no tenderness.   Abdominal: Soft. Bowel sounds are normal. He exhibits no distension. There is no tenderness.   Musculoskeletal: Normal range of motion.   Lymphadenopathy:     He has no cervical adenopathy.   Neurological: He is alert and oriented to person, place, and time. He has normal reflexes. He displays normal reflexes. No cranial nerve deficit. Coordination normal.   Skin: Skin is warm and dry.   Psychiatric: He has a normal mood and affect. His behavior is normal.  Judgment and thought content normal.   Nursing note and vitals reviewed.      Procedures           ED Course  ED Course as of May 19 1950   Tue May 19, 2020   1602 Spoke to Dr. Reynoso at  dentistry he suggested switching to clindamycin giving a dose IV here and then seeing the patient tomorrow in the walk-in dentistry clinic.  Explained to the patient very clearly and he verbalized understanding that he had to be there is close to 8 AM as he could see could be 1 of the first patient is to be seen.    [NICOLÁS]      ED Course User Index  [NICOLÁS] Jorge Camarillo PA                                   Recent Results (from the past 24 hour(s))   Comprehensive Metabolic Panel    Collection Time: 05/19/20  1:12 PM   Result Value Ref Range    Glucose 190 (H) 65 - 99 mg/dL    BUN 11 6 - 20 mg/dL    Creatinine 0.89 0.76 - 1.27 mg/dL    Sodium 139 136 - 145 mmol/L    Potassium 4.2 3.5 - 5.2 mmol/L    Chloride 104 98 - 107 mmol/L    CO2 24.0 22.0 - 29.0 mmol/L    Calcium 8.8 8.6 - 10.5 mg/dL    Total Protein 6.4 6.0 - 8.5 g/dL    Albumin 3.80 3.50 - 5.20 g/dL    ALT (SGPT) 53 (H) 1 - 41 U/L    AST (SGOT) 27 1 - 40 U/L    Alkaline Phosphatase 109 39 - 117 U/L    Total Bilirubin 0.3 0.2 - 1.2 mg/dL    eGFR Non African Amer 101 >60 mL/min/1.73    Globulin 2.6 gm/dL    A/G Ratio 1.5 g/dL    BUN/Creatinine Ratio 12.4 7.0 - 25.0    Anion Gap 11.0 5.0 - 15.0 mmol/L   Light Blue Top    Collection Time: 05/19/20  1:12 PM   Result Value Ref Range    Extra Tube hold for add-on    Green Top (Gel)    Collection Time: 05/19/20  1:12 PM   Result Value Ref Range    Extra Tube Hold for add-ons.    Lavender Top    Collection Time: 05/19/20  1:12 PM   Result Value Ref Range    Extra Tube hold for add-on    Gold Top - SST    Collection Time: 05/19/20  1:12 PM   Result Value Ref Range    Extra Tube Hold for add-ons.    CBC Auto Differential    Collection Time: 05/19/20  1:12 PM   Result Value Ref Range    WBC 5.49 3.40 - 10.80 10*3/mm3    RBC 4.93  4.14 - 5.80 10*6/mm3    Hemoglobin 15.5 13.0 - 17.7 g/dL    Hematocrit 46.2 37.5 - 51.0 %    MCV 93.7 79.0 - 97.0 fL    MCH 31.4 26.6 - 33.0 pg    MCHC 33.5 31.5 - 35.7 g/dL    RDW 12.2 (L) 12.3 - 15.4 %    RDW-SD 42.4 37.0 - 54.0 fl    MPV 9.3 6.0 - 12.0 fL    Platelets 175 140 - 450 10*3/mm3    Neutrophil % 60.3 42.7 - 76.0 %    Lymphocyte % 26.0 19.6 - 45.3 %    Monocyte % 10.2 5.0 - 12.0 %    Eosinophil % 2.7 0.3 - 6.2 %    Basophil % 0.4 0.0 - 1.5 %    Immature Grans % 0.4 0.0 - 0.5 %    Neutrophils, Absolute 3.31 1.70 - 7.00 10*3/mm3    Lymphocytes, Absolute 1.43 0.70 - 3.10 10*3/mm3    Monocytes, Absolute 0.56 0.10 - 0.90 10*3/mm3    Eosinophils, Absolute 0.15 0.00 - 0.40 10*3/mm3    Basophils, Absolute 0.02 0.00 - 0.20 10*3/mm3    Immature Grans, Absolute 0.02 0.00 - 0.05 10*3/mm3    nRBC 0.0 0.0 - 0.2 /100 WBC     Note: In addition to lab results from this visit, the labs listed above may include labs taken at another facility or during a different encounter within the last 24 hours. Please correlate lab times with ED admission and discharge times for further clarification of the services performed during this visit.    CT Facial Bones With Contrast   Preliminary Result   Mild cellulitis seen and edema within the subcutaneous   tissues overlying the right cheek. There is no facial bone abnormality   identified. Mucous retention cyst and mucosal thickening in the   maxillary sinuses bilaterally with no air-fluid level to suggest an   acute sinusitis. No acute facial bone abnormality identified.                Vitals:    05/19/20 1430 05/19/20 1530 05/19/20 1630 05/19/20 1708   BP: 148/96 142/88 138/86 134/88   BP Location:   Left arm    Patient Position:   Sitting    Pulse: 85 85 86 85   Resp: 18 18 18 18   Temp:   97.8 °F (36.6 °C) 97.8 °F (36.6 °C)   TempSrc:   Oral Oral   SpO2: 98% 97% 98% 98%   Weight:       Height:         Medications   iopamidol (ISOVUE-300) 61 % injection 100 mL (75 mL Intravenous  Given 5/19/20 1410)   clindamycin (CLEOCIN) 900 mg in dextrose 5% 50 mL IVPB (premix) (0 mg Intravenous Stopped 5/19/20 1708)     ECG/EMG Results (last 24 hours)     ** No results found for the last 24 hours. **        No orders to display               MDM      Final diagnoses:   Facial cellulitis   Dental abscess            Jorge Camarillo PA  05/19/20 1950

## 2020-07-02 ENCOUNTER — HOSPITAL ENCOUNTER (INPATIENT)
Facility: HOSPITAL | Age: 30
LOS: 1 days | Discharge: HOME | End: 2020-07-03
Attending: EMERGENCY MEDICINE | Admitting: INTERNAL MEDICINE

## 2020-07-02 ENCOUNTER — APPOINTMENT (OUTPATIENT)
Dept: GENERAL RADIOLOGY | Facility: HOSPITAL | Age: 30
End: 2020-07-02

## 2020-07-02 ENCOUNTER — APPOINTMENT (OUTPATIENT)
Dept: CT IMAGING | Facility: HOSPITAL | Age: 30
End: 2020-07-02

## 2020-07-02 DIAGNOSIS — I63.9 ACUTE ISCHEMIC STROKE (HCC): Primary | ICD-10-CM

## 2020-07-02 PROBLEM — F19.90 IVDU (INTRAVENOUS DRUG USER): Status: ACTIVE | Noted: 2020-07-02

## 2020-07-02 PROBLEM — F17.200 SMOKER: Status: ACTIVE | Noted: 2020-07-02

## 2020-07-02 PROBLEM — R73.01 IMPAIRED FASTING GLUCOSE: Status: ACTIVE | Noted: 2020-07-02

## 2020-07-02 PROBLEM — F31.9 BIPOLAR DISORDER (HCC): Chronic | Status: ACTIVE | Noted: 2020-07-02

## 2020-07-02 PROBLEM — F32.A ANXIETY AND DEPRESSION: Chronic | Status: ACTIVE | Noted: 2020-07-02

## 2020-07-02 PROBLEM — I10 HYPERTENSION: Status: ACTIVE | Noted: 2020-07-02

## 2020-07-02 PROBLEM — F41.9 ANXIETY AND DEPRESSION: Status: ACTIVE | Noted: 2020-07-02

## 2020-07-02 PROBLEM — F41.9 ANXIETY AND DEPRESSION: Chronic | Status: ACTIVE | Noted: 2020-07-02

## 2020-07-02 PROBLEM — Q06.2 DIASTEMATOMYELIA: Chronic | Status: ACTIVE | Noted: 2020-07-02

## 2020-07-02 PROBLEM — F31.9 BIPOLAR DISORDER (HCC): Status: ACTIVE | Noted: 2020-07-02

## 2020-07-02 PROBLEM — F32.A ANXIETY AND DEPRESSION: Status: ACTIVE | Noted: 2020-07-02

## 2020-07-02 PROBLEM — Q06.2 DIASTEMATOMYELIA (HCC): Status: ACTIVE | Noted: 2020-07-02

## 2020-07-02 PROBLEM — E66.9 CLASS 1 OBESITY IN ADULT: Status: ACTIVE | Noted: 2020-07-02

## 2020-07-02 PROBLEM — E66.811 CLASS 1 OBESITY IN ADULT: Status: ACTIVE | Noted: 2020-07-02

## 2020-07-02 LAB
ALT SERPL W P-5'-P-CCNC: 53 U/L (ref 1–41)
AMPHET+METHAMPHET UR QL: NEGATIVE
AMPHETAMINES UR QL: NEGATIVE
APTT PPP: 30 SECONDS (ref 24–37)
AST SERPL-CCNC: 25 U/L (ref 1–40)
BARBITURATES UR QL SCN: NEGATIVE
BASE EXCESS BLDA CALC-SCNC: 1 MMOL/L (ref -5–5)
BASOPHILS # BLD AUTO: 0.03 10*3/MM3 (ref 0–0.2)
BASOPHILS NFR BLD AUTO: 0.4 % (ref 0–1.5)
BENZODIAZ UR QL SCN: NEGATIVE
BUPRENORPHINE SERPL-MCNC: NEGATIVE NG/ML
CA-I BLDA-SCNC: 1.27 MMOL/L (ref 1.2–1.32)
CANNABINOIDS SERPL QL: NEGATIVE
CO2 BLDA-SCNC: 27 MMOL/L (ref 24–29)
COCAINE UR QL: NEGATIVE
DEPRECATED RDW RBC AUTO: 39.4 FL (ref 37–54)
EOSINOPHIL # BLD AUTO: 0.23 10*3/MM3 (ref 0–0.4)
EOSINOPHIL NFR BLD AUTO: 3.4 % (ref 0.3–6.2)
ERYTHROCYTE [DISTWIDTH] IN BLOOD BY AUTOMATED COUNT: 11.7 % (ref 12.3–15.4)
GLUCOSE BLDC GLUCOMTR-MCNC: 198 MG/DL (ref 70–130)
GLUCOSE BLDC GLUCOMTR-MCNC: 92 MG/DL (ref 70–130)
HCO3 BLDA-SCNC: 25.8 MMOL/L (ref 22–26)
HCT VFR BLD AUTO: 47.8 % (ref 37.5–51)
HCT VFR BLDA CALC: 48 % (ref 38–51)
HGB BLD-MCNC: 16.2 G/DL (ref 13–17.7)
HGB BLDA-MCNC: 16.3 G/DL (ref 12–17)
HOLD SPECIMEN: NORMAL
HOLD SPECIMEN: NORMAL
IMM GRANULOCYTES # BLD AUTO: 0.02 10*3/MM3 (ref 0–0.05)
IMM GRANULOCYTES NFR BLD AUTO: 0.3 % (ref 0–0.5)
INR PPP: 0.9 (ref 0.8–1.2)
LYMPHOCYTES # BLD AUTO: 2.07 10*3/MM3 (ref 0.7–3.1)
LYMPHOCYTES NFR BLD AUTO: 30.7 % (ref 19.6–45.3)
MCH RBC QN AUTO: 31.3 PG (ref 26.6–33)
MCHC RBC AUTO-ENTMCNC: 33.9 G/DL (ref 31.5–35.7)
MCV RBC AUTO: 92.5 FL (ref 79–97)
METHADONE UR QL SCN: NEGATIVE
MONOCYTES # BLD AUTO: 0.57 10*3/MM3 (ref 0.1–0.9)
MONOCYTES NFR BLD AUTO: 8.5 % (ref 5–12)
NEUTROPHILS NFR BLD AUTO: 3.82 10*3/MM3 (ref 1.7–7)
NEUTROPHILS NFR BLD AUTO: 56.7 % (ref 42.7–76)
NRBC BLD AUTO-RTO: 0 /100 WBC (ref 0–0.2)
OPIATES UR QL: NEGATIVE
OXYCODONE UR QL SCN: NEGATIVE
PCO2 BLDA: 44 MM HG (ref 35–45)
PCP UR QL SCN: NEGATIVE
PH BLDA: 7.38 PH UNITS (ref 7.35–7.6)
PLATELET # BLD AUTO: 218 10*3/MM3 (ref 140–450)
PMV BLD AUTO: 9.3 FL (ref 6–12)
PO2 BLDA: 29 MMHG (ref 80–105)
POTASSIUM BLDA-SCNC: 3.8 MMOL/L (ref 3.5–4.9)
PROPOXYPH UR QL: NEGATIVE
PROTHROMBIN TIME: 11.2 SECONDS (ref 12.8–15.2)
RBC # BLD AUTO: 5.17 10*6/MM3 (ref 4.14–5.8)
SAO2 % BLDA: 54 % (ref 95–98)
SODIUM BLD-SCNC: 140 MMOL/L (ref 138–146)
TRICYCLICS UR QL SCN: NEGATIVE
TROPONIN T SERPL-MCNC: <0.01 NG/ML (ref 0–0.03)
WBC # BLD AUTO: 6.74 10*3/MM3 (ref 3.4–10.8)
WHOLE BLOOD HOLD SPECIMEN: NORMAL
WHOLE BLOOD HOLD SPECIMEN: NORMAL

## 2020-07-02 PROCEDURE — 3E03317 INTRODUCTION OF OTHER THROMBOLYTIC INTO PERIPHERAL VEIN, PERCUTANEOUS APPROACH: ICD-10-PCS | Performed by: EMERGENCY MEDICINE

## 2020-07-02 PROCEDURE — 99254 IP/OBS CNSLTJ NEW/EST MOD 60: CPT | Performed by: NURSE PRACTITIONER

## 2020-07-02 PROCEDURE — 70450 CT HEAD/BRAIN W/O DYE: CPT

## 2020-07-02 PROCEDURE — 85025 COMPLETE CBC W/AUTO DIFF WBC: CPT | Performed by: EMERGENCY MEDICINE

## 2020-07-02 PROCEDURE — 99291 CRITICAL CARE FIRST HOUR: CPT | Performed by: INTERNAL MEDICINE

## 2020-07-02 PROCEDURE — 84295 ASSAY OF SERUM SODIUM: CPT

## 2020-07-02 PROCEDURE — 70498 CT ANGIOGRAPHY NECK: CPT

## 2020-07-02 PROCEDURE — 70496 CT ANGIOGRAPHY HEAD: CPT

## 2020-07-02 PROCEDURE — 85730 THROMBOPLASTIN TIME PARTIAL: CPT | Performed by: EMERGENCY MEDICINE

## 2020-07-02 PROCEDURE — 99285 EMERGENCY DEPT VISIT HI MDM: CPT

## 2020-07-02 PROCEDURE — 82565 ASSAY OF CREATININE: CPT

## 2020-07-02 PROCEDURE — 84132 ASSAY OF SERUM POTASSIUM: CPT

## 2020-07-02 PROCEDURE — 93005 ELECTROCARDIOGRAM TRACING: CPT | Performed by: EMERGENCY MEDICINE

## 2020-07-02 PROCEDURE — 25010000002 ALTEPLASE PER 1 MG: Performed by: EMERGENCY MEDICINE

## 2020-07-02 PROCEDURE — 84450 TRANSFERASE (AST) (SGOT): CPT | Performed by: EMERGENCY MEDICINE

## 2020-07-02 PROCEDURE — 85610 PROTHROMBIN TIME: CPT

## 2020-07-02 PROCEDURE — 71045 X-RAY EXAM CHEST 1 VIEW: CPT

## 2020-07-02 PROCEDURE — 82330 ASSAY OF CALCIUM: CPT

## 2020-07-02 PROCEDURE — 84460 ALANINE AMINO (ALT) (SGPT): CPT | Performed by: EMERGENCY MEDICINE

## 2020-07-02 PROCEDURE — 0042T HC CT CEREBRAL PERFUSION W/WO CONTRAST: CPT

## 2020-07-02 PROCEDURE — 84484 ASSAY OF TROPONIN QUANT: CPT | Performed by: EMERGENCY MEDICINE

## 2020-07-02 PROCEDURE — 80306 DRUG TEST PRSMV INSTRMNT: CPT | Performed by: NURSE PRACTITIONER

## 2020-07-02 PROCEDURE — 82803 BLOOD GASES ANY COMBINATION: CPT

## 2020-07-02 PROCEDURE — 82947 ASSAY GLUCOSE BLOOD QUANT: CPT

## 2020-07-02 PROCEDURE — 0 IOPAMIDOL PER 1 ML: Performed by: EMERGENCY MEDICINE

## 2020-07-02 PROCEDURE — 85014 HEMATOCRIT: CPT

## 2020-07-02 RX ORDER — ASPIRIN 325 MG
325 TABLET ORAL DAILY
Status: DISCONTINUED | OUTPATIENT
Start: 2020-07-03 | End: 2020-07-03 | Stop reason: HOSPADM

## 2020-07-02 RX ORDER — SODIUM CHLORIDE 9 MG/ML
100 INJECTION, SOLUTION INTRAVENOUS ONCE
Status: COMPLETED | OUTPATIENT
Start: 2020-07-02 | End: 2020-07-02

## 2020-07-02 RX ORDER — SODIUM CHLORIDE 0.9 % (FLUSH) 0.9 %
10 SYRINGE (ML) INJECTION EVERY 12 HOURS SCHEDULED
Status: DISCONTINUED | OUTPATIENT
Start: 2020-07-02 | End: 2020-07-03 | Stop reason: HOSPADM

## 2020-07-02 RX ORDER — NAPROXEN 500 MG/1
500 TABLET ORAL 2 TIMES DAILY PRN
COMMUNITY
Start: 2015-06-06 | End: 2020-07-03 | Stop reason: HOSPADM

## 2020-07-02 RX ORDER — ASPIRIN 300 MG/1
300 SUPPOSITORY RECTAL DAILY
Status: DISCONTINUED | OUTPATIENT
Start: 2020-07-03 | End: 2020-07-03 | Stop reason: HOSPADM

## 2020-07-02 RX ORDER — SODIUM CHLORIDE 0.9 % (FLUSH) 0.9 %
10 SYRINGE (ML) INJECTION AS NEEDED
Status: DISCONTINUED | OUTPATIENT
Start: 2020-07-02 | End: 2020-07-03 | Stop reason: HOSPADM

## 2020-07-02 RX ORDER — ATORVASTATIN CALCIUM 40 MG/1
80 TABLET, FILM COATED ORAL NIGHTLY
Status: DISCONTINUED | OUTPATIENT
Start: 2020-07-02 | End: 2020-07-03 | Stop reason: HOSPADM

## 2020-07-02 RX ORDER — SODIUM CHLORIDE 0.9 % (FLUSH) 0.9 %
10 SYRINGE (ML) INJECTION AS NEEDED
Status: DISCONTINUED | OUTPATIENT
Start: 2020-07-02 | End: 2020-07-03

## 2020-07-02 RX ORDER — NICOTINE 21 MG/24HR
1 PATCH, TRANSDERMAL 24 HOURS TRANSDERMAL
Status: DISCONTINUED | OUTPATIENT
Start: 2020-07-02 | End: 2020-07-03

## 2020-07-02 RX ADMIN — SODIUM CHLORIDE, PRESERVATIVE FREE 10 ML: 5 INJECTION INTRAVENOUS at 20:03

## 2020-07-02 RX ADMIN — IOPAMIDOL 115 ML: 755 INJECTION, SOLUTION INTRAVENOUS at 12:31

## 2020-07-02 RX ADMIN — ALTEPLASE 78.98 MG: KIT at 12:42

## 2020-07-02 RX ADMIN — ALTEPLASE 8.78 MG: KIT at 12:43

## 2020-07-02 RX ADMIN — SODIUM CHLORIDE 100 ML/HR: 900 INJECTION, SOLUTION INTRAVENOUS at 13:35

## 2020-07-02 RX ADMIN — ATORVASTATIN CALCIUM 80 MG: 40 TABLET, FILM COATED ORAL at 20:03

## 2020-07-03 ENCOUNTER — APPOINTMENT (OUTPATIENT)
Dept: CT IMAGING | Facility: HOSPITAL | Age: 30
End: 2020-07-03

## 2020-07-03 ENCOUNTER — READMISSION MANAGEMENT (OUTPATIENT)
Dept: CALL CENTER | Facility: HOSPITAL | Age: 30
End: 2020-07-03

## 2020-07-03 ENCOUNTER — APPOINTMENT (OUTPATIENT)
Dept: CARDIOLOGY | Facility: HOSPITAL | Age: 30
End: 2020-07-03

## 2020-07-03 ENCOUNTER — APPOINTMENT (OUTPATIENT)
Dept: MRI IMAGING | Facility: HOSPITAL | Age: 30
End: 2020-07-03

## 2020-07-03 VITALS
HEART RATE: 87 BPM | SYSTOLIC BLOOD PRESSURE: 122 MMHG | OXYGEN SATURATION: 97 % | BODY MASS INDEX: 33.18 KG/M2 | RESPIRATION RATE: 16 BRPM | TEMPERATURE: 98.4 F | WEIGHT: 224 LBS | DIASTOLIC BLOOD PRESSURE: 76 MMHG | HEIGHT: 69 IN

## 2020-07-03 LAB
ALBUMIN SERPL-MCNC: 4 G/DL (ref 3.5–5.2)
ALBUMIN/GLOB SERPL: 1.7 G/DL
ALP SERPL-CCNC: 108 U/L (ref 39–117)
ALT SERPL W P-5'-P-CCNC: 64 U/L (ref 1–41)
ANION GAP SERPL CALCULATED.3IONS-SCNC: 7 MMOL/L (ref 5–15)
AST SERPL-CCNC: 21 U/L (ref 1–40)
BASOPHILS # BLD AUTO: 0.03 10*3/MM3 (ref 0–0.2)
BASOPHILS NFR BLD AUTO: 0.4 % (ref 0–1.5)
BH CV ECHO MEAS - AO ROOT AREA (BSA CORRECTED): 1.6
BH CV ECHO MEAS - AO ROOT AREA: 9.5 CM^2
BH CV ECHO MEAS - AO ROOT DIAM: 3.5 CM
BH CV ECHO MEAS - BSA(HAYCOCK): 2.3 M^2
BH CV ECHO MEAS - BSA: 2.2 M^2
BH CV ECHO MEAS - BZI_BMI: 33.1 KILOGRAMS/M^2
BH CV ECHO MEAS - BZI_METRIC_HEIGHT: 175.3 CM
BH CV ECHO MEAS - BZI_METRIC_WEIGHT: 101.6 KG
BH CV ECHO MEAS - EDV(CUBED): 108.9 ML
BH CV ECHO MEAS - EDV(MOD-SP2): 52 ML
BH CV ECHO MEAS - EDV(MOD-SP4): 74 ML
BH CV ECHO MEAS - EDV(TEICH): 106.2 ML
BH CV ECHO MEAS - EF(CUBED): 77.9 %
BH CV ECHO MEAS - EF(MOD-SP2): 50 %
BH CV ECHO MEAS - EF(MOD-SP4): 51.4 %
BH CV ECHO MEAS - EF(TEICH): 70 %
BH CV ECHO MEAS - ESV(CUBED): 24.1 ML
BH CV ECHO MEAS - ESV(MOD-SP2): 26 ML
BH CV ECHO MEAS - ESV(MOD-SP4): 36 ML
BH CV ECHO MEAS - ESV(TEICH): 31.9 ML
BH CV ECHO MEAS - FS: 39.5 %
BH CV ECHO MEAS - IVS/LVPW: 0.81
BH CV ECHO MEAS - IVSD: 0.76 CM
BH CV ECHO MEAS - LA DIMENSION: 2.7 CM
BH CV ECHO MEAS - LA/AO: 0.78
BH CV ECHO MEAS - LAD MAJOR: 4 CM
BH CV ECHO MEAS - LAT PEAK E' VEL: 11.3 CM/SEC
BH CV ECHO MEAS - LATERAL E/E' RATIO: 6.4
BH CV ECHO MEAS - LV DIASTOLIC VOL/BSA (35-75): 34.1 ML/M^2
BH CV ECHO MEAS - LV MASS(C)D: 135.3 GRAMS
BH CV ECHO MEAS - LV MASS(C)DI: 62.4 GRAMS/M^2
BH CV ECHO MEAS - LV SYSTOLIC VOL/BSA (12-30): 16.6 ML/M^2
BH CV ECHO MEAS - LVIDD: 4.8 CM
BH CV ECHO MEAS - LVIDS: 2.9 CM
BH CV ECHO MEAS - LVLD AP2: 6.8 CM
BH CV ECHO MEAS - LVLD AP4: 6 CM
BH CV ECHO MEAS - LVLS AP2: 4.3 CM
BH CV ECHO MEAS - LVLS AP4: 5.9 CM
BH CV ECHO MEAS - LVOT AREA (M): 3.1 CM^2
BH CV ECHO MEAS - LVOT AREA: 3.3 CM^2
BH CV ECHO MEAS - LVOT DIAM: 2 CM
BH CV ECHO MEAS - LVPWD: 0.94 CM
BH CV ECHO MEAS - MED PEAK E' VEL: 8.4 CM/SEC
BH CV ECHO MEAS - MEDIAL E/E' RATIO: 8.5
BH CV ECHO MEAS - MV A MAX VEL: 47.4 CM/SEC
BH CV ECHO MEAS - MV DEC SLOPE: 330.4 CM/SEC^2
BH CV ECHO MEAS - MV DEC TIME: 0.21 SEC
BH CV ECHO MEAS - MV E MAX VEL: 73.1 CM/SEC
BH CV ECHO MEAS - MV E/A: 1.5
BH CV ECHO MEAS - MV P1/2T MAX VEL: 89 CM/SEC
BH CV ECHO MEAS - MV P1/2T: 78.9 MSEC
BH CV ECHO MEAS - MVA P1/2T LCG: 2.5 CM^2
BH CV ECHO MEAS - MVA(P1/2T): 2.8 CM^2
BH CV ECHO MEAS - PA ACC SLOPE: 452.4 CM/SEC^2
BH CV ECHO MEAS - PA ACC TIME: 0.15 SEC
BH CV ECHO MEAS - PA PR(ACCEL): 9.3 MMHG
BH CV ECHO MEAS - SI(CUBED): 39.1 ML/M^2
BH CV ECHO MEAS - SI(MOD-SP2): 12 ML/M^2
BH CV ECHO MEAS - SI(MOD-SP4): 17.5 ML/M^2
BH CV ECHO MEAS - SI(TEICH): 34.3 ML/M^2
BH CV ECHO MEAS - SV(CUBED): 84.8 ML
BH CV ECHO MEAS - SV(MOD-SP2): 26 ML
BH CV ECHO MEAS - SV(MOD-SP4): 38 ML
BH CV ECHO MEAS - SV(TEICH): 74.4 ML
BH CV ECHO MEAS - TAPSE (>1.6): 2.5 CM2
BH CV ECHO MEASUREMENTS AVERAGE E/E' RATIO: 7.42
BH CV VAS BP RIGHT ARM: NORMAL MMHG
BH CV XLRA - RV BASE: 3.1 CM
BH CV XLRA - RV LENGTH: 7.6 CM
BH CV XLRA - RV MID: 2.6 CM
BH CV XLRA - TDI S': 9.7 CM/SEC
BILIRUB SERPL-MCNC: 0.4 MG/DL (ref 0.2–1.2)
BUN SERPL-MCNC: 12 MG/DL (ref 6–20)
BUN/CREAT SERPL: 10.6 (ref 7–25)
CALCIUM SPEC-SCNC: 9 MG/DL (ref 8.6–10.5)
CHLORIDE SERPL-SCNC: 102 MMOL/L (ref 98–107)
CHOLEST SERPL-MCNC: 175 MG/DL (ref 0–200)
CO2 SERPL-SCNC: 31 MMOL/L (ref 22–29)
CREAT BLDA-MCNC: 1 MG/DL (ref 0.6–1.3)
CREAT SERPL-MCNC: 1.13 MG/DL (ref 0.76–1.27)
DEPRECATED RDW RBC AUTO: 41.2 FL (ref 37–54)
EOSINOPHIL # BLD AUTO: 0.29 10*3/MM3 (ref 0–0.4)
EOSINOPHIL NFR BLD AUTO: 3.9 % (ref 0.3–6.2)
ERYTHROCYTE [DISTWIDTH] IN BLOOD BY AUTOMATED COUNT: 11.8 % (ref 12.3–15.4)
GFR SERPL CREATININE-BSD FRML MDRD: 76 ML/MIN/1.73
GLOBULIN UR ELPH-MCNC: 2.4 GM/DL
GLUCOSE BLDC GLUCOMTR-MCNC: 114 MG/DL (ref 70–130)
GLUCOSE BLDC GLUCOMTR-MCNC: 166 MG/DL (ref 70–130)
GLUCOSE SERPL-MCNC: 109 MG/DL (ref 65–99)
HBA1C MFR BLD: 6.3 % (ref 4.8–5.6)
HCT VFR BLD AUTO: 47.6 % (ref 37.5–51)
HDLC SERPL-MCNC: 28 MG/DL (ref 40–60)
HGB BLD-MCNC: 15.7 G/DL (ref 13–17.7)
IMM GRANULOCYTES # BLD AUTO: 0.03 10*3/MM3 (ref 0–0.05)
IMM GRANULOCYTES NFR BLD AUTO: 0.4 % (ref 0–0.5)
LDLC SERPL CALC-MCNC: 93 MG/DL (ref 0–100)
LDLC/HDLC SERPL: 3.31 {RATIO}
LEFT ATRIUM VOLUME INDEX: 12.9 ML/M^2
LEFT ATRIUM VOLUME: 28 ML
LV EF 2D ECHO EST: 55 %
LYMPHOCYTES # BLD AUTO: 2.8 10*3/MM3 (ref 0.7–3.1)
LYMPHOCYTES NFR BLD AUTO: 37.4 % (ref 19.6–45.3)
MAGNESIUM SERPL-MCNC: 2.2 MG/DL (ref 1.6–2.6)
MAXIMAL PREDICTED HEART RATE: 190 BPM
MCH RBC QN AUTO: 31.2 PG (ref 26.6–33)
MCHC RBC AUTO-ENTMCNC: 33 G/DL (ref 31.5–35.7)
MCV RBC AUTO: 94.6 FL (ref 79–97)
MONOCYTES # BLD AUTO: 0.58 10*3/MM3 (ref 0.1–0.9)
MONOCYTES NFR BLD AUTO: 7.7 % (ref 5–12)
NEUTROPHILS NFR BLD AUTO: 3.76 10*3/MM3 (ref 1.7–7)
NEUTROPHILS NFR BLD AUTO: 50.2 % (ref 42.7–76)
NRBC BLD AUTO-RTO: 0 /100 WBC (ref 0–0.2)
PHOSPHATE SERPL-MCNC: 4.1 MG/DL (ref 2.5–4.5)
PLATELET # BLD AUTO: 200 10*3/MM3 (ref 140–450)
PMV BLD AUTO: 9.3 FL (ref 6–12)
POTASSIUM SERPL-SCNC: 4.2 MMOL/L (ref 3.5–5.2)
PROT SERPL-MCNC: 6.4 G/DL (ref 6–8.5)
RBC # BLD AUTO: 5.03 10*6/MM3 (ref 4.14–5.8)
SODIUM SERPL-SCNC: 140 MMOL/L (ref 136–145)
STRESS TARGET HR: 162 BPM
TRIGL SERPL-MCNC: 272 MG/DL (ref 0–150)
VLDLC SERPL-MCNC: 54.4 MG/DL
WBC # BLD AUTO: 7.49 10*3/MM3 (ref 3.4–10.8)

## 2020-07-03 PROCEDURE — 70551 MRI BRAIN STEM W/O DYE: CPT

## 2020-07-03 PROCEDURE — 85025 COMPLETE CBC W/AUTO DIFF WBC: CPT | Performed by: INTERNAL MEDICINE

## 2020-07-03 PROCEDURE — 92523 SPEECH SOUND LANG COMPREHEN: CPT

## 2020-07-03 PROCEDURE — 84100 ASSAY OF PHOSPHORUS: CPT | Performed by: NURSE PRACTITIONER

## 2020-07-03 PROCEDURE — 99239 HOSP IP/OBS DSCHRG MGMT >30: CPT | Performed by: NURSE PRACTITIONER

## 2020-07-03 PROCEDURE — 93306 TTE W/DOPPLER COMPLETE: CPT

## 2020-07-03 PROCEDURE — 97165 OT EVAL LOW COMPLEX 30 MIN: CPT

## 2020-07-03 PROCEDURE — 83735 ASSAY OF MAGNESIUM: CPT | Performed by: NURSE PRACTITIONER

## 2020-07-03 PROCEDURE — 93306 TTE W/DOPPLER COMPLETE: CPT | Performed by: INTERNAL MEDICINE

## 2020-07-03 PROCEDURE — 82962 GLUCOSE BLOOD TEST: CPT

## 2020-07-03 PROCEDURE — 80053 COMPREHEN METABOLIC PANEL: CPT | Performed by: INTERNAL MEDICINE

## 2020-07-03 PROCEDURE — 97161 PT EVAL LOW COMPLEX 20 MIN: CPT

## 2020-07-03 PROCEDURE — 70450 CT HEAD/BRAIN W/O DYE: CPT

## 2020-07-03 PROCEDURE — 83036 HEMOGLOBIN GLYCOSYLATED A1C: CPT | Performed by: INTERNAL MEDICINE

## 2020-07-03 PROCEDURE — 80061 LIPID PANEL: CPT | Performed by: NURSE PRACTITIONER

## 2020-07-03 PROCEDURE — G0108 DIAB MANAGE TRN  PER INDIV: HCPCS

## 2020-07-03 RX ORDER — ATORVASTATIN CALCIUM 80 MG/1
80 TABLET, FILM COATED ORAL NIGHTLY
Qty: 30 TABLET | Refills: 1 | Status: SHIPPED | OUTPATIENT
Start: 2020-07-03 | End: 2020-10-05 | Stop reason: SDUPTHER

## 2020-07-03 RX ORDER — NICOTINE 21 MG/24HR
1 PATCH, TRANSDERMAL 24 HOURS TRANSDERMAL
Qty: 30 PATCH | Refills: 1 | Status: SHIPPED | OUTPATIENT
Start: 2020-07-04 | End: 2020-10-05

## 2020-07-03 RX ORDER — NICOTINE 21 MG/24HR
1 PATCH, TRANSDERMAL 24 HOURS TRANSDERMAL
Status: DISCONTINUED | OUTPATIENT
Start: 2020-07-03 | End: 2020-07-03 | Stop reason: HOSPADM

## 2020-07-03 RX ORDER — ASPIRIN 81 MG/1
81 TABLET ORAL DAILY
Qty: 30 TABLET | Refills: 1 | Status: SHIPPED | OUTPATIENT
Start: 2020-07-03 | End: 2021-02-02

## 2020-07-03 RX ADMIN — NICOTINE 1 PATCH: 21 PATCH TRANSDERMAL at 05:06

## 2020-07-03 RX ADMIN — ASPIRIN 325 MG ORAL TABLET 325 MG: 325 PILL ORAL at 14:05

## 2020-07-03 NOTE — OUTREACH NOTE
Prep Survey      Responses   [facility] patient discharged from?  [place]   Is LACE score < 7 ?  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  Stroke (TIA)   Does the patient have Home health ordered?  No   Is there a DME ordered?  No   Prep survey completed?  Yes          Lisbeth Olivier, RN

## 2020-07-04 ENCOUNTER — TRANSITIONAL CARE MANAGEMENT TELEPHONE ENCOUNTER (OUTPATIENT)
Dept: CALL CENTER | Facility: HOSPITAL | Age: 30
End: 2020-07-04

## 2020-07-04 NOTE — OUTREACH NOTE
Call Center TCM Note      Responses   [facility] patient discharged from?  [place]   Does the patient have one of the following disease processes/diagnoses(primary or secondary)?  Stroke (TIA)   TCM attempt successful?  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  Yes   Is the patient having any side effects they believe may be caused by any medication additions or changes?  No   Does the patient have all medications ordered at discharge?  Yes   Is the patient taking all medications as directed (includes completed medication regime)?  Yes   Medication comments  was able to get and start medications. no issues so far.    Does the patient have a primary care provider?   Yes   Does the patient have an appointment with their PCP within 7 days of discharge?  Yes   Comments regarding PCP  Chandni Mckeon 7/7/20 1030   Has the patient kept scheduled appointments due by today?  N/A   Has home health visited the patient within 72 hours of discharge?  N/A   Psychosocial issues?  No   Does the patient require any assistance with activities of daily living such as eating, bathing, dressing, walking, etc.?  No   Does the patient have any residual symptoms from stroke/TIA?  No   Does the patient understand the diet ordered at discharge?  Yes   Did the patient receive a copy of their discharge instructions?  Yes   Nursing interventions  Reviewed instructions with patient   What is the patient's perception of their health status since discharge?  Improving   Nursing interventions  Nurse provided patient education   Is the patient able to teach back FAST for Stroke?  Yes   Is the patient/caregiver able to teach back the risk factors for a stroke?  Smoking, Physical inactivity and obesity, Sleep apnea   Is the patient/caregiver able to teach back signs and symptoms related to disease process for when to call PCP?  Yes   Is the  patient/caregiver able to teach back signs and symptoms related to disease process for when to call 911?  Yes   If the patient is a current smoker, are they able to teach back resources for cessation?  Smoking cessation medications   Is the patient/caregiver able to teach back the hierarchy of who to call/visit for symptoms/problems? PCP, Specialist, Home health nurse, Urgent Care, ED, 911  Yes   TCM call completed?  Yes   Wrap up additional comments  TPA resolved all symptoms. Doing well. Is using patches to quit smoking. Discussed s/s that need immediate f/u. he is agreeable. Appt scheduled for hospital f/u. Verified he hasnumber for [employer].           Jamaica Banegas RN    7/4/2020, 11:27

## 2020-07-07 ENCOUNTER — OFFICE VISIT (OUTPATIENT)
Dept: INTERNAL MEDICINE | Facility: CLINIC | Age: 30
End: 2020-07-07

## 2020-07-07 VITALS
HEIGHT: 69 IN | WEIGHT: 228 LBS | DIASTOLIC BLOOD PRESSURE: 96 MMHG | OXYGEN SATURATION: 98 % | BODY MASS INDEX: 33.77 KG/M2 | HEART RATE: 83 BPM | SYSTOLIC BLOOD PRESSURE: 134 MMHG | TEMPERATURE: 97.1 F

## 2020-07-07 DIAGNOSIS — Q21.12 PFO (PATENT FORAMEN OVALE): ICD-10-CM

## 2020-07-07 DIAGNOSIS — E78.5 HYPERLIPIDEMIA, UNSPECIFIED HYPERLIPIDEMIA TYPE: ICD-10-CM

## 2020-07-07 DIAGNOSIS — I63.9 ACUTE ISCHEMIC STROKE (HCC): Primary | ICD-10-CM

## 2020-07-07 DIAGNOSIS — I10 ESSENTIAL HYPERTENSION: ICD-10-CM

## 2020-07-07 PROCEDURE — 99214 OFFICE O/P EST MOD 30 MIN: CPT | Performed by: NURSE PRACTITIONER

## 2020-07-07 RX ORDER — METOPROLOL SUCCINATE 25 MG/1
25 TABLET, EXTENDED RELEASE ORAL DAILY
Qty: 30 TABLET | Refills: 2 | Status: SHIPPED | OUTPATIENT
Start: 2020-07-07 | End: 2020-09-28

## 2020-07-07 NOTE — PROGRESS NOTES
Transitional Care Follow Up Visit  Subjective     Bradley LEDEZMA Lonny is a 30 y.o. male who presents for a transitional care management visit.    Within 48 business hours after discharge our office contacted him via telephone to coordinate his care and needs.      I reviewed and discussed the details of that call along with the discharge summary, hospital problems, inpatient lab results, inpatient diagnostic studies, and consultation reports with Bradley.     Current outpatient and discharge medications have been reconciled for the patient.  Reviewed by: REGGIE Brunner      Date of TCM Phone Call 7/3/2020   [facility]   Date of Admission 7/2/2020   Date of Discharge 7/3/2020   Discharge Disposition Home or Self Care     Risk for Readmission (LACE) Score: 6 (7/3/2020  6:00 AM)      History of Present Illness   Course During Hospital Stay:     Patient with previous history of anxiety, depression and bipolar disorder.  Previous history of IV drug use but states that he is sober for last 10 years.  The patient's last known normal was ~9AM when he began experiencing left arm pain followed by numbness and tingling then weakness as well as slurred speech and left facial droop.  Patient states that he was also having headache at this point.  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  According to patient significant other patient did slide out of the truck and fell and was limping afterwards with significant weakness on the left side of his body.  EMS was called and he was brought to our facility with initial NIH 5. CT head negative for acute hemorrhage and he was deemed appropriate for tPA which was initiated at 1242. CTA head/neck and CTP negative.      He was previously evaluated by Dr. 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 There is a previous history of addiction therefore the patient did not wish to be placed on narcotics and was referred to PT for conservative management, however it appears it missed many appointments.      Additionally he was placed on Prozac this year with dose escalation for management of anxiety and depression with concern for underlying bipolar disorder per his PCP.      Hospital Course:  The patient was admitted for reasons mentioned above in the HPI and implemented on post-thrombolytic ischemic stroke order set. Repeat 24 hour MRI was negative for infarct with improvement of NIH to 0. Echocardiogram does show a mildly positive saline test with valsalva maneuver suggested of a PFO but negative for ASD or intra-cardiac thrombus without significant valvular disease and preserved EF of 55%. Labs were overall benign apart from an elevated hemoglobin A1C in which diabetes educated was consulted and strongly recommended dietary and lifestyle modifications.      At this time it is felt that Mr. Mukherjee has achieved maximum benefit from hospital therapy with return to baseline functionality and is appropriate for discharge home. He was evaluated by Dr. Montgomery with neurology who feels the patient's symptomatology is vascular in origin and not a stroke given his overall negative imaging. He will be placed on low-dose ASA with neurology follow-up for further evaluation. PT/OT evaluated the patient who demonstrates functional independence and ambulated 350 feet. He will be discharged home today with the follow-ups and medication regimen listed below.      Bradley presents today for follow-up on the above-mentioned hospital stay.  Patient reports that he is feeling 100% better.  He has had no further episodes since discharge.  He is taking his aspirin and Lipitor as prescribed.  Denies any medication side effects.  In reviewing his hospital records it is  noted that there is a possible PFO on his echo with bubble study.  He has not been referred to cardiology.  I will enter this referral today.  He has not scheduled his appointment with neurology.  He will call as soon as he leaves here today and schedule this.    Blood pressure is running too high.  This is been an intermittent problem for him since he has become a patient.  We will start blood pressure medication today.  Patient is on board and verbalizes understanding of need.     The following portions of the patient's history were reviewed and updated as appropriate: allergies, current medications, past family history, past medical history, past social history, past surgical history and problem list.    Review of Systems   Constitutional: Negative for chills, fatigue and fever.   Respiratory: Negative for cough, chest tightness and shortness of breath.    Cardiovascular: Negative for chest pain.   Gastrointestinal: Negative for abdominal pain, diarrhea, nausea and vomiting.   Endocrine: Negative for cold intolerance and heat intolerance.   Musculoskeletal: Negative for arthralgias.   Neurological: Negative for dizziness.       Objective   Physical Exam   Constitutional: He is oriented to person, place, and time. He appears well-developed and well-nourished.   HENT:   Head: Normocephalic and atraumatic.   Eyes: Pupils are equal, round, and reactive to light. Conjunctivae and EOM are normal.   Neck: Normal range of motion.   Cardiovascular: Normal rate, regular rhythm and normal heart sounds.   Pulmonary/Chest: Effort normal and breath sounds normal.   Abdominal: Soft. Bowel sounds are normal.   Musculoskeletal: Normal range of motion.   Neurological: He is alert and oriented to person, place, and time. He has normal reflexes.   Skin: Skin is warm and dry.   Psychiatric: He has a normal mood and affect. His behavior is normal. Judgment and thought content normal.       Assessment/Plan   Bradley was seen today  for transitional care management.    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      Patient will call neurology to schedule appointment today.  Continue Lipitor  Continue aspirin  Refer to cardiology for evaluation of possible PFO  Metoprolol XL 25 mg 1 p.o. daily added  I have provided patient with a note to ask that he not drive/work alone until further evaluation and work-up per neurology and cardiology.  Return in about 4 weeks (around 8/4/2020).

## 2020-07-13 ENCOUNTER — READMISSION MANAGEMENT (OUTPATIENT)
Dept: CALL CENTER | Facility: HOSPITAL | Age: 30
End: 2020-07-13

## 2020-07-13 NOTE — OUTREACH NOTE
Stroke Week 2 Survey      Responses   [facility] patient discharged from?  [place]   Does the patient have one of the following disease processes/diagnoses(primary or secondary)?  Stroke (TIA)   Week 2 attempt successful?  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  Yes   Is the patient taking all medications as directed (includes completed medication regime)?  Yes   Has the patient kept scheduled appointments due by today?  Yes   Does the patient require any assistance with activities of daily living such as eating, bathing, dressing, walking, etc.?  No   Does the patient have any residual symptoms from stroke/TIA?  No   Does the patient understand the diet ordered at discharge?  Yes   What is the patient's perception of their health status since discharge?  Improving   Is the patient able to teach back FAST for Stroke?  Yes   Is the patient/caregiver able to teach back the risk factors for a stroke?  High blood pressure-goal below 120/80, Smoking, Physical inactivity and obesity   Is the patient/caregiver able to teach back signs and symptoms related to disease process for when to call PCP?  Yes   Is the patient/caregiver able to teach back signs and symptoms related to disease process for when to call 911?  Yes   If the patient is a current smoker, are they able to teach back resources for cessation?  Smoking cessation medications   Is the patient/caregiver able to teach back the hierarchy of who to call/visit for symptoms/problems? PCP, Specialist, Home health nurse, Urgent Care, ED, 911  Yes   Additional teach back comments  Pt doing better. 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 He is still trying to cut down on smoking. Down to one pack from 2-3 PPD.   Week 2 call completed?  Yes          Rosalie Wolf RN

## 2020-07-27 RX ORDER — ASPIRIN 81 MG/1
TABLET ORAL
Qty: 30 TABLET | Refills: 1 | OUTPATIENT
Start: 2020-07-27

## 2020-07-27 RX ORDER — ATORVASTATIN CALCIUM 80 MG/1
TABLET, FILM COATED ORAL
Qty: 30 TABLET | Refills: 1 | OUTPATIENT
Start: 2020-07-27

## 2020-07-28 ENCOUNTER — CONSULT (OUTPATIENT)
Dept: CARDIOLOGY | Facility: CLINIC | Age: 30
End: 2020-07-28

## 2020-07-28 VITALS
DIASTOLIC BLOOD PRESSURE: 72 MMHG | WEIGHT: 226.2 LBS | TEMPERATURE: 96.9 F | HEIGHT: 69 IN | BODY MASS INDEX: 33.5 KG/M2 | HEART RATE: 83 BPM | OXYGEN SATURATION: 97 % | SYSTOLIC BLOOD PRESSURE: 112 MMHG

## 2020-07-28 DIAGNOSIS — Q21.12 PFO (PATENT FORAMEN OVALE): Primary | ICD-10-CM

## 2020-07-28 PROCEDURE — 99243 OFF/OP CNSLTJ NEW/EST LOW 30: CPT | Performed by: INTERNAL MEDICINE

## 2020-07-28 PROCEDURE — 93000 ELECTROCARDIOGRAM COMPLETE: CPT | Performed by: INTERNAL MEDICINE

## 2020-07-28 NOTE — ASSESSMENT & PLAN NOTE
· I reviewed the notes from his recent hospitalization from Dr. Montgomery.  While stroke was initially suspected, work-up including brain imaging was unremarkable.  Ultimately, was felt that stroke was less likely of an etiology for his episode.  · Without documented stroke or suspicion of cardioembolic TIA, there is no role for PFO closure  · PFO affects 20% of healthy adults and is likely an incidental finding  · If patient were to have recurrent neurologic events or ischemic stroke noted on MRI, PFO closure should be reassessed
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
## 2020-08-04 ENCOUNTER — OFFICE VISIT (OUTPATIENT)
Dept: INTERNAL MEDICINE | Facility: CLINIC | Age: 30
End: 2020-08-04

## 2020-08-04 ENCOUNTER — LAB REQUISITION (OUTPATIENT)
Dept: LAB | Facility: HOSPITAL | Age: 30
End: 2020-08-04

## 2020-08-04 VITALS
BODY MASS INDEX: 33.8 KG/M2 | WEIGHT: 228.2 LBS | HEIGHT: 69 IN | HEART RATE: 86 BPM | DIASTOLIC BLOOD PRESSURE: 76 MMHG | RESPIRATION RATE: 16 BRPM | SYSTOLIC BLOOD PRESSURE: 114 MMHG | TEMPERATURE: 97.5 F | OXYGEN SATURATION: 99 %

## 2020-08-04 DIAGNOSIS — Z00.00 ROUTINE GENERAL MEDICAL EXAMINATION AT A HEALTH CARE FACILITY: ICD-10-CM

## 2020-08-04 DIAGNOSIS — Q21.12 PFO (PATENT FORAMEN OVALE): ICD-10-CM

## 2020-08-04 DIAGNOSIS — I63.9 ACUTE ISCHEMIC STROKE (HCC): Primary | ICD-10-CM

## 2020-08-04 DIAGNOSIS — E78.2 MIXED HYPERLIPIDEMIA: ICD-10-CM

## 2020-08-04 PROCEDURE — 99214 OFFICE O/P EST MOD 30 MIN: CPT | Performed by: NURSE PRACTITIONER

## 2020-08-04 PROCEDURE — 93000 ELECTROCARDIOGRAM COMPLETE: CPT | Performed by: INTERNAL MEDICINE

## 2020-08-04 NOTE — PROGRESS NOTES
Subjective   Bradley Mukherjee is a 30 y.o. male    Chief Complaint   Patient presents with   • 4 week follow up     Saw Cardiology on 7/28/2020.    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    Hypertension -metoprolol XL 25 mg 1 p.o. daily was initiated at last visit.  Blood pressure looks great today.  He states that he is tolerating this medication well without side effects.  Has not checked blood pressure at home.  BP at recent cardiology visit was great.    Patient was referred to cardiology for evaluation of suspected PFO.  Was seen by Dr. Greene on 7/28/2020.  Per his consult he did not feel that this possible PFO was of significance being as though an acute stroke was ruled out during his hospitalization.    Patient was to follow-up with neurology after recent hospitalization.  Patient states that he lost the number is not contacted them to schedule appointment.  He requests referral today.    Patient states that overall he is feeling well.  No complaints today.    The following portions of the patient's history were reviewed and updated as appropriate: allergies, current medications, past family history, past medical history, past social history, past surgical history and problem list.    Current Outpatient Medications:   •  acetaminophen (TYLENOL) 500 MG tablet, Take 1,000 mg by mouth Every 6 (Six) Hours As Needed for Mild Pain ., Disp: , Rfl:   •  aspirin (aspirin) 81 MG EC tablet, Take 1 tablet by mouth Daily., Disp: 30 tablet, Rfl: 1  •  atorvastatin (LIPITOR) 80 MG tablet, Take 1 tablet by mouth Every Night., Disp: 30 tablet, Rfl: 1  •  FLUoxetine (PROZAC) 40 MG capsule, Take 1 capsule by mouth Daily., Disp: 90 capsule, Rfl: 1  •  metoprolol succinate XL (Toprol XL) 25 MG 24 hr tablet, Take 1 tablet by mouth Daily., Disp: 30 tablet, Rfl: 2  •  nicotine (NICODERM CQ) 21 MG/24HR patch, Place 1 patch on the skin as directed  "by provider Daily., Disp: 30 patch, Rfl: 1     Review of Systems   Constitutional: Negative for chills, fatigue and fever.   Respiratory: Negative for cough, chest tightness and shortness of breath.    Cardiovascular: Negative for chest pain.   Gastrointestinal: Negative for abdominal pain, diarrhea, nausea and vomiting.   Endocrine: Negative for cold intolerance and heat intolerance.   Musculoskeletal: Negative for arthralgias.   Neurological: Negative for dizziness.       Objective   Physical Exam   Constitutional: He is oriented to person, place, and time. He appears well-developed and well-nourished.   HENT:   Head: Normocephalic and atraumatic.   Eyes: Pupils are equal, round, and reactive to light. Conjunctivae and EOM are normal.   Neck: Normal range of motion.   Cardiovascular: Normal rate, regular rhythm and normal heart sounds.   Pulmonary/Chest: Effort normal and breath sounds normal.   Abdominal: Soft. Bowel sounds are normal.   Musculoskeletal: Normal range of motion.   Neurological: He is alert and oriented to person, place, and time. He has normal reflexes.   Skin: Skin is warm and dry.   Psychiatric: He has a normal mood and affect. His behavior is normal. Judgment and thought content normal.     Vitals:    08/04/20 1120   BP: 114/76   Pulse: 86   Resp: 16   Temp: 97.5 °F (36.4 °C)   TempSrc: Infrared   SpO2: 99%   Weight: 104 kg (228 lb 3.2 oz)   Height: 175.3 cm (69.02\")         Assessment/Plan   Bradley was seen today for 4 week follow up, hypertension, suspected ais s/p tpa and pfo.    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 daily without change  We will check a CMP today since this is not been done since he has been on the statin.  Referred to neurology  Return in about 2 months (around 10/4/2020) for Annual.

## 2020-08-05 LAB
ALBUMIN SERPL-MCNC: 4.6 G/DL (ref 3.5–5.2)
ALBUMIN/GLOB SERPL: 2 G/DL
ALP SERPL-CCNC: 137 U/L (ref 39–117)
ALT SERPL-CCNC: 114 U/L (ref 1–41)
AST SERPL-CCNC: 31 U/L (ref 1–40)
BILIRUB SERPL-MCNC: 0.3 MG/DL (ref 0–1.2)
BUN SERPL-MCNC: 14 MG/DL (ref 6–20)
BUN/CREAT SERPL: 13.5 (ref 7–25)
CALCIUM SERPL-MCNC: 9.4 MG/DL (ref 8.6–10.5)
CHLORIDE SERPL-SCNC: 101 MMOL/L (ref 98–107)
CO2 SERPL-SCNC: 25.3 MMOL/L (ref 22–29)
CREAT SERPL-MCNC: 1.04 MG/DL (ref 0.76–1.27)
GLOBULIN SER CALC-MCNC: 2.3 GM/DL
GLUCOSE SERPL-MCNC: 122 MG/DL (ref 65–99)
POTASSIUM SERPL-SCNC: 5 MMOL/L (ref 3.5–5.2)
PROT SERPL-MCNC: 6.9 G/DL (ref 6–8.5)
SODIUM SERPL-SCNC: 138 MMOL/L (ref 136–145)

## 2020-08-18 DIAGNOSIS — R73.09 ABNORMAL GLUCOSE: Primary | ICD-10-CM

## 2020-08-18 PROCEDURE — 83036 HEMOGLOBIN GLYCOSYLATED A1C: CPT | Performed by: NURSE PRACTITIONER

## 2020-08-19 ENCOUNTER — TELEPHONE (OUTPATIENT)
Dept: INTERNAL MEDICINE | Facility: CLINIC | Age: 30
End: 2020-08-19

## 2020-08-19 LAB — HBA1C MFR BLD: 6.3 %

## 2020-08-19 NOTE — TELEPHONE ENCOUNTER
----- Message from REGGIE Brunner sent at 8/18/2020  9:18 AM EDT -----  Please let patient know that blood sugar was slightly elevated.  I would like for him to stop by for an A1c.

## 2020-09-28 DIAGNOSIS — I10 ESSENTIAL HYPERTENSION: ICD-10-CM

## 2020-09-28 RX ORDER — METOPROLOL SUCCINATE 25 MG/1
TABLET, EXTENDED RELEASE ORAL
Qty: 30 TABLET | Refills: 0 | Status: SHIPPED | OUTPATIENT
Start: 2020-09-28 | End: 2020-10-05 | Stop reason: SDUPTHER

## 2020-10-05 ENCOUNTER — OFFICE VISIT (OUTPATIENT)
Dept: INTERNAL MEDICINE | Facility: CLINIC | Age: 30
End: 2020-10-05

## 2020-10-05 VITALS
HEIGHT: 69 IN | RESPIRATION RATE: 16 BRPM | HEART RATE: 93 BPM | DIASTOLIC BLOOD PRESSURE: 82 MMHG | SYSTOLIC BLOOD PRESSURE: 120 MMHG | WEIGHT: 231.4 LBS | BODY MASS INDEX: 34.27 KG/M2 | OXYGEN SATURATION: 98 % | TEMPERATURE: 97.3 F

## 2020-10-05 DIAGNOSIS — E78.2 MIXED HYPERLIPIDEMIA: ICD-10-CM

## 2020-10-05 DIAGNOSIS — F33.9 EPISODE OF RECURRENT MAJOR DEPRESSIVE DISORDER, UNSPECIFIED DEPRESSION EPISODE SEVERITY (HCC): ICD-10-CM

## 2020-10-05 DIAGNOSIS — Q21.12 PFO (PATENT FORAMEN OVALE): ICD-10-CM

## 2020-10-05 DIAGNOSIS — F17.210 CIGARETTE SMOKER: ICD-10-CM

## 2020-10-05 DIAGNOSIS — R73.01 IMPAIRED FASTING GLUCOSE: ICD-10-CM

## 2020-10-05 DIAGNOSIS — F41.9 ANXIETY: ICD-10-CM

## 2020-10-05 DIAGNOSIS — K04.7 TOOTH ABSCESS: ICD-10-CM

## 2020-10-05 DIAGNOSIS — I10 ESSENTIAL HYPERTENSION: ICD-10-CM

## 2020-10-05 DIAGNOSIS — Z00.00 ROUTINE GENERAL MEDICAL EXAMINATION AT A HEALTH CARE FACILITY: Primary | ICD-10-CM

## 2020-10-05 PROCEDURE — 99395 PREV VISIT EST AGE 18-39: CPT | Performed by: NURSE PRACTITIONER

## 2020-10-05 RX ORDER — FLUOXETINE HYDROCHLORIDE 40 MG/1
40 CAPSULE ORAL DAILY
Qty: 90 CAPSULE | Refills: 1 | Status: SHIPPED | OUTPATIENT
Start: 2020-10-05 | End: 2021-07-13 | Stop reason: SDUPTHER

## 2020-10-05 RX ORDER — AMOXICILLIN AND CLAVULANATE POTASSIUM 875; 125 MG/1; MG/1
1 TABLET, FILM COATED ORAL 2 TIMES DAILY
Qty: 20 TABLET | Refills: 0 | Status: SHIPPED | OUTPATIENT
Start: 2020-10-05 | End: 2020-10-15

## 2020-10-05 RX ORDER — METOPROLOL SUCCINATE 25 MG/1
25 TABLET, EXTENDED RELEASE ORAL DAILY
Qty: 90 TABLET | Refills: 1 | Status: SHIPPED | OUTPATIENT
Start: 2020-10-05 | End: 2022-04-14 | Stop reason: SDUPTHER

## 2020-10-05 RX ORDER — VARENICLINE TARTRATE 1 MG/1
1 TABLET, FILM COATED ORAL 2 TIMES DAILY
Qty: 60 TABLET | Refills: 2 | Status: SHIPPED | OUTPATIENT
Start: 2020-10-05 | End: 2021-02-02

## 2020-10-05 RX ORDER — ATORVASTATIN CALCIUM 80 MG/1
80 TABLET, FILM COATED ORAL NIGHTLY
Qty: 90 TABLET | Refills: 1 | Status: SHIPPED | OUTPATIENT
Start: 2020-10-05 | End: 2021-03-30

## 2020-10-05 NOTE — PROGRESS NOTES
Subjective   Bradley Mukherjee is a 30 y.o. male    Chief Complaint   Patient presents with   • Annual Exam     History of Present Illness     Here for annual exam    Hypertension -metoprolol XL 25 mg 1 p.o. daily after hospitalization in July 2020.  Blood pressure looks great today.  He states that he is tolerating this medication well without side effects.  Has not checked blood pressure at home.      Patient was referred to cardiology for evaluation of suspected PFO.  Was seen by Dr. Greene on 7/28/2020.  Per his consult he did not feel that this possible PFO was of significance being as though an acute stroke was ruled out during his hospitalization.     Patient was to follow-up with neurology after recent hospitalization.  Patient states that he lost the number is not contacted them to schedule appointment.  New referral was entered at last visit and he is scheduled to 10/23/2020    Anxiety/depression - chronic/stable on Prozac 40 mg daily.  Denies med SE's.  Feels that this has helped his irritability.    BG was slightly elevated at last ck.  He RTC for an A1C and it was 6.3%    C/O recurrent tooth abscess on the right upper jaw line.  He will make an ASAP appt with dentist as he knows he needs teeth pulled.    Tdap - 2014  Flu shot - declines  Hep A - declines  Hep C screening - 2/2020    Diet - needs work.  Has cut out energy drinks, but is still drinking 12+ sodas per day    Exercise - none    Social History     Tobacco Use   • Smoking status: Current Every Day Smoker     Packs/day: 0.50     Years: 15.00     Pack years: 7.50   • Smokeless tobacco: Never Used   • Tobacco comment: Tried patches. But they did not help.   Substance Use Topics   • Alcohol use: Yes     Comment: VERY SELDOM   • Drug use: Yes     Types: Marijuana, Cocaine(coke)     Comment: LAST DRUG USE 2012     States that the nicotine patches did nothing for him.  He is interested in Chantix    The following portions of the patient's history  were reviewed and updated as appropriate: allergies, current medications, past family history, past medical history, past social history, past surgical history and problem list.    Current Outpatient Medications:   •  acetaminophen (TYLENOL) 500 MG tablet, Take 1,000 mg by mouth Every 6 (Six) Hours As Needed for Mild Pain ., Disp: , Rfl:   •  aspirin (aspirin) 81 MG EC tablet, Take 1 tablet by mouth Daily., Disp: 30 tablet, Rfl: 1  •  atorvastatin (LIPITOR) 80 MG tablet, Take 1 tablet by mouth Every Night., Disp: 90 tablet, Rfl: 1  •  FLUoxetine (PROzac) 40 MG capsule, Take 1 capsule by mouth Daily., Disp: 90 capsule, Rfl: 1  •  metoprolol succinate XL (TOPROL-XL) 25 MG 24 hr tablet, Take 1 tablet by mouth Daily., Disp: 90 tablet, Rfl: 1  •  amoxicillin-clavulanate (Augmentin) 875-125 MG per tablet, Take 1 tablet by mouth 2 (Two) Times a Day for 10 days., Disp: 20 tablet, Rfl: 0  •  varenicline (Chantix Continuing Month Pak) 1 MG tablet, Take 1 tablet by mouth 2 (Two) Times a Day., Disp: 60 tablet, Rfl: 2  •  varenicline (Chantix Starting Month Idris) 0.5 MG X 11 & 1 MG X 42 tablet, Take 0.5 mg one daily on days 1-3 and and 0.5 mg twice daily on days 4-7.Then 1 mg twice daily for a total of 12 weeks., Disp: 53 tablet, Rfl: 0     Review of Systems   Constitutional: Negative for appetite change, chills, fatigue, fever and unexpected weight change.   HENT: Negative for congestion, ear pain, nosebleeds, rhinorrhea and tinnitus.         Toothache   Eyes: Negative for pain.   Respiratory: Negative for cough, chest tightness and shortness of breath.    Cardiovascular: Negative for chest pain, palpitations and leg swelling.   Gastrointestinal: Negative for abdominal distention, abdominal pain, blood in stool, constipation, diarrhea, nausea and vomiting.   Endocrine: Negative for cold intolerance, heat intolerance, polydipsia, polyphagia and polyuria.   Genitourinary: Negative for dysuria, flank pain, frequency, hematuria and  urgency.   Musculoskeletal: Negative for arthralgias, back pain, gait problem, joint swelling, myalgias and neck pain.   Skin: Negative for color change, pallor, rash and wound.   Allergic/Immunologic: Negative for environmental allergies and food allergies.   Neurological: Negative for dizziness, syncope, weakness, light-headedness, numbness and headaches.   Hematological: Negative for adenopathy. Does not bruise/bleed easily.   Psychiatric/Behavioral: Negative for behavioral problems and suicidal ideas. The patient is not nervous/anxious.        Objective   Physical Exam  Constitutional:       Appearance: He is well-developed and normal weight.   HENT:      Head: Normocephalic and atraumatic.      Right Ear: External ear normal.      Left Ear: External ear normal.      Nose: Nose normal.      Mouth/Throat:      Mouth: Mucous membranes are moist.      Pharynx: Oropharynx is clear.     Eyes:      General: Lids are normal.      Conjunctiva/sclera: Conjunctivae normal.      Pupils: Pupils are equal, round, and reactive to light.   Neck:      Musculoskeletal: Normal range of motion and neck supple.      Vascular: No carotid bruit.   Cardiovascular:      Rate and Rhythm: Normal rate and regular rhythm.      Pulses: Normal pulses.      Heart sounds: Normal heart sounds. No murmur.   Pulmonary:      Effort: Pulmonary effort is normal.      Breath sounds: Normal breath sounds.   Abdominal:      General: Bowel sounds are normal.      Palpations: Abdomen is soft. There is no hepatomegaly or splenomegaly.      Hernia: No hernia is present.   Genitourinary:     Rectum: Guaiac result negative.   Musculoskeletal: Normal range of motion.   Lymphadenopathy:      Cervical: No cervical adenopathy.   Skin:     General: Skin is warm and dry.      Capillary Refill: Capillary refill takes less than 2 seconds.   Neurological:      Mental Status: He is alert and oriented to person, place, and time.      Deep Tendon Reflexes: Reflexes are  "normal and symmetric.   Psychiatric:         Mood and Affect: Mood normal.         Behavior: Behavior normal.         Thought Content: Thought content normal.         Judgment: Judgment normal.       Vitals:    10/05/20 0921   BP: 120/82   Pulse: 93   Resp: 16   Temp: 97.3 °F (36.3 °C)   TempSrc: Infrared   SpO2: 98%   Weight: 105 kg (231 lb 6.4 oz)   Height: 175.3 cm (69.02\")         Assessment/Plan   Bradley was seen today for annual exam.    Diagnoses and all orders for this visit:    Routine general medical examination at a health care facility  -     CBC & Differential; Future  -     Comprehensive Metabolic Panel; Future  -     Lipid Panel; Future  -     TSH; Future  -     Vitamin B12; Future  -     Vitamin D 25 Hydroxy; Future  -     Hemoglobin A1c; Future    Essential hypertension  -     CBC & Differential; Future  -     Comprehensive Metabolic Panel; Future  -     Lipid Panel; Future  -     TSH; Future  -     Vitamin B12; Future  -     Vitamin D 25 Hydroxy; Future  -     Hemoglobin A1c; Future  -     metoprolol succinate XL (TOPROL-XL) 25 MG 24 hr tablet; Take 1 tablet by mouth Daily.    Mixed hyperlipidemia  -     CBC & Differential; Future  -     Comprehensive Metabolic Panel; Future  -     Lipid Panel; Future  -     TSH; Future  -     Vitamin B12; Future  -     Vitamin D 25 Hydroxy; Future  -     Hemoglobin A1c; Future  -     atorvastatin (LIPITOR) 80 MG tablet; Take 1 tablet by mouth Every Night.    PFO (patent foramen ovale)  -     CBC & Differential; Future  -     Comprehensive Metabolic Panel; Future  -     Lipid Panel; Future  -     TSH; Future  -     Vitamin B12; Future  -     Vitamin D 25 Hydroxy; Future  -     Hemoglobin A1c; Future    Impaired fasting glucose  -     CBC & Differential; Future  -     Comprehensive Metabolic Panel; Future  -     Lipid Panel; Future  -     TSH; Future  -     Vitamin B12; Future  -     Vitamin D 25 Hydroxy; Future  -     Hemoglobin A1c; Future    Anxiety  -     " CBC & Differential; Future  -     Comprehensive Metabolic Panel; Future  -     Lipid Panel; Future  -     TSH; Future  -     Vitamin B12; Future  -     Vitamin D 25 Hydroxy; Future  -     Hemoglobin A1c; Future  -     FLUoxetine (PROzac) 40 MG capsule; Take 1 capsule by mouth Daily.    Episode of recurrent major depressive disorder, unspecified depression episode severity (CMS/HCC)  -     CBC & Differential; Future  -     Comprehensive Metabolic Panel; Future  -     Lipid Panel; Future  -     TSH; Future  -     Vitamin B12; Future  -     Vitamin D 25 Hydroxy; Future  -     Hemoglobin A1c; Future    Tooth abscess  -     amoxicillin-clavulanate (Augmentin) 875-125 MG per tablet; Take 1 tablet by mouth 2 (Two) Times a Day for 10 days.    Cigarette smoker  -     varenicline (Chantix Starting Month Idris) 0.5 MG X 11 & 1 MG X 42 tablet; Take 0.5 mg one daily on days 1-3 and and 0.5 mg twice daily on days 4-7.Then 1 mg twice daily for a total of 12 weeks.  -     varenicline (Chantix Continuing Month Pak) 1 MG tablet; Take 1 tablet by mouth 2 (Two) Times a Day.      Meds refilled  He is not fasting so he will RTC for labs  Declines flu shot, pneumovax and hep A  Covered with Augmentin for tooth abscess  Chantix as directed  Counseling - diet and exercise  Bradley Mukherjee  reports that he has been smoking. He has a 7.50 pack-year smoking history. He has never used smokeless tobacco.. I have educated him on the risk of diseases from using tobacco products such as cancer, COPD and heart diease.     I advised him to quit and he is willing to quit. We have discussed the following method/s for tobacco cessation:  Education Material Counseling Prescription Medicaiton.  Together we have set a quit date for 1 month from today.  He will follow up with me in 6 months or sooner to check on his progress.    I spent >10 minutes counseling the patient.    Return in about 6 months (around 4/5/2021).

## 2020-10-06 ENCOUNTER — LAB REQUISITION (OUTPATIENT)
Dept: LAB | Facility: HOSPITAL | Age: 30
End: 2020-10-06

## 2020-10-06 ENCOUNTER — CALL CENTER PROGRAMS (OUTPATIENT)
Dept: CALL CENTER | Facility: HOSPITAL | Age: 30
End: 2020-10-06

## 2020-10-06 DIAGNOSIS — Z00.00 ROUTINE GENERAL MEDICAL EXAMINATION AT A HEALTH CARE FACILITY: ICD-10-CM

## 2020-10-06 PROCEDURE — 36415 COLL VENOUS BLD VENIPUNCTURE: CPT | Performed by: NURSE PRACTITIONER

## 2020-10-06 NOTE — OUTREACH NOTE
Stroke Chau Survey      Responses   Facility patient discharged from?  [facility]   Attempt successful  No   Unsuccessful attempts  Attempt 1          Macey Schwartz RN

## 2020-10-07 ENCOUNTER — CALL CENTER PROGRAMS (OUTPATIENT)
Dept: CALL CENTER | Facility: HOSPITAL | Age: 30
End: 2020-10-07

## 2020-10-07 LAB
25(OH)D3+25(OH)D2 SERPL-MCNC: 28.5 NG/ML (ref 30–100)
ALBUMIN SERPL-MCNC: 4.4 G/DL (ref 4.1–5.2)
ALBUMIN/GLOB SERPL: 1.6 {RATIO} (ref 1.2–2.2)
ALP SERPL-CCNC: 143 IU/L (ref 39–117)
ALT SERPL-CCNC: 121 IU/L (ref 0–44)
AST SERPL-CCNC: 33 IU/L (ref 0–40)
BASOPHILS # BLD AUTO: 0 X10E3/UL (ref 0–0.2)
BASOPHILS NFR BLD AUTO: 0 %
BILIRUB SERPL-MCNC: 0.6 MG/DL (ref 0–1.2)
BUN SERPL-MCNC: 17 MG/DL (ref 6–20)
BUN/CREAT SERPL: 15 (ref 9–20)
CALCIUM SERPL-MCNC: 9.4 MG/DL (ref 8.7–10.2)
CHLORIDE SERPL-SCNC: 102 MMOL/L (ref 96–106)
CHOLEST SERPL-MCNC: 174 MG/DL (ref 100–199)
CO2 SERPL-SCNC: 25 MMOL/L (ref 20–29)
CREAT SERPL-MCNC: 1.1 MG/DL (ref 0.76–1.27)
EOSINOPHIL # BLD AUTO: 0.3 X10E3/UL (ref 0–0.4)
EOSINOPHIL NFR BLD AUTO: 4 %
ERYTHROCYTE [DISTWIDTH] IN BLOOD BY AUTOMATED COUNT: 11.9 % (ref 11.6–15.4)
GLOBULIN SER CALC-MCNC: 2.7 G/DL (ref 1.5–4.5)
GLUCOSE SERPL-MCNC: 127 MG/DL (ref 65–99)
HBA1C MFR BLD: 6.4 % (ref 4.8–5.6)
HCT VFR BLD AUTO: 49 % (ref 37.5–51)
HDLC SERPL-MCNC: 31 MG/DL
HGB BLD-MCNC: 16.6 G/DL (ref 13–17.7)
IMM GRANULOCYTES # BLD AUTO: 0 X10E3/UL (ref 0–0.1)
IMM GRANULOCYTES NFR BLD AUTO: 0 %
LDLC SERPL CALC-MCNC: 100 MG/DL (ref 0–99)
LYMPHOCYTES # BLD AUTO: 2.8 X10E3/UL (ref 0.7–3.1)
LYMPHOCYTES NFR BLD AUTO: 34 %
MCH RBC QN AUTO: 31.1 PG (ref 26.6–33)
MCHC RBC AUTO-ENTMCNC: 33.9 G/DL (ref 31.5–35.7)
MCV RBC AUTO: 92 FL (ref 79–97)
MONOCYTES # BLD AUTO: 0.6 X10E3/UL (ref 0.1–0.9)
MONOCYTES NFR BLD AUTO: 7 %
NEUTROPHILS # BLD AUTO: 4.6 X10E3/UL (ref 1.4–7)
NEUTROPHILS NFR BLD AUTO: 55 %
PLATELET # BLD AUTO: 231 X10E3/UL (ref 150–450)
POTASSIUM SERPL-SCNC: 4.6 MMOL/L (ref 3.5–5.2)
PROT SERPL-MCNC: 7.1 G/DL (ref 6–8.5)
RBC # BLD AUTO: 5.34 X10E6/UL (ref 4.14–5.8)
SODIUM SERPL-SCNC: 139 MMOL/L (ref 134–144)
TRIGL SERPL-MCNC: 250 MG/DL (ref 0–149)
TSH SERPL DL<=0.005 MIU/L-ACNC: 1.81 UIU/ML (ref 0.45–4.5)
VIT B12 SERPL-MCNC: 563 PG/ML (ref 232–1245)
VLDLC SERPL CALC-MCNC: 43 MG/DL (ref 5–40)
WBC # BLD AUTO: 8.3 X10E3/UL (ref 3.4–10.8)

## 2020-10-07 NOTE — OUTREACH NOTE
Stroke Chau Survey      Responses   Facility patient discharged from?  [facility]   Attempt successful  No   Unsuccessful attempts  Attempt 2          Macey Schwartz RN

## 2020-10-09 ENCOUNTER — CALL CENTER PROGRAMS (OUTPATIENT)
Dept: CALL CENTER | Facility: HOSPITAL | Age: 30
End: 2020-10-09

## 2020-10-09 NOTE — OUTREACH NOTE
"Stroke Chau Survey      Responses   Facility patient discharged from?  [facility]   Attempt successful  Yes   Call start time  1225   Person spoke with today (if not patient) and relationship  Patient   Did the patient die within 30 days of admission?  No   Did the patient die within 30 days of discharge?  No   Call end time  1226   Patient location 30 days post discharge if known  Home   Could you live alone without any help from another person?  Yes   Can you do everything that you were doing right before your stroke even if slower and not as much?  Yes   Are you completely back to the way you were right before your stroke?  Yes   Can you walk from one room to another without help from another person?  Yes   Can the patient sit up in bed without any help?  Yes   Call Center Chau Score  0   [place] score call completed  Yes   Comments  Patient states ?\"I am back to 100%.\"  Reports no residual stroke symptoms.  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          Macey Schwartz RN  "

## 2020-10-22 DIAGNOSIS — R74.8 ELEVATED LIVER ENZYMES: Primary | ICD-10-CM

## 2020-10-23 ENCOUNTER — TELEPHONE (OUTPATIENT)
Dept: INTERNAL MEDICINE | Facility: CLINIC | Age: 30
End: 2020-10-23

## 2020-10-23 NOTE — TELEPHONE ENCOUNTER
----- Message from REGGIE Brunner sent at 10/22/2020  7:12 PM EDT -----  A1C is 6.4%, if it goes above 6.5% I will dx with DM II and have to start him on meds.  He needs to cut back on sugar and carb intake and work on weight loss.    Vit D is low.  I recommend OTC Vit D3, 2000 units daily.    Liver enzymes have increased.  I am going to set him up for an US of his liver.

## 2020-11-11 ENCOUNTER — HOSPITAL ENCOUNTER (OUTPATIENT)
Dept: ULTRASOUND IMAGING | Facility: HOSPITAL | Age: 30
Discharge: HOME | End: 2020-11-11
Admitting: NURSE PRACTITIONER

## 2020-11-11 DIAGNOSIS — R74.8 ELEVATED LIVER ENZYMES: ICD-10-CM

## 2020-11-11 PROCEDURE — 76705 ECHO EXAM OF ABDOMEN: CPT

## 2020-11-23 ENCOUNTER — TELEPHONE (OUTPATIENT)
Dept: INTERNAL MEDICINE | Facility: CLINIC | Age: 30
End: 2020-11-23

## 2020-11-23 NOTE — TELEPHONE ENCOUNTER
----- Message from REGGIE Brunner sent at 11/16/2020  3:17 PM EST -----  Ultrasound shows a nonobstructing kidney stone on the right.  Liver is within normal limits.  Nothing to do with this kidney stone unless he develops pain or blood in the urine.

## 2021-02-02 ENCOUNTER — OFFICE VISIT (OUTPATIENT)
Dept: INTERNAL MEDICINE | Facility: CLINIC | Age: 31
End: 2021-02-02

## 2021-02-02 VITALS
SYSTOLIC BLOOD PRESSURE: 120 MMHG | OXYGEN SATURATION: 95 % | DIASTOLIC BLOOD PRESSURE: 88 MMHG | TEMPERATURE: 96.9 F | WEIGHT: 229.8 LBS | BODY MASS INDEX: 34.04 KG/M2 | HEART RATE: 91 BPM | HEIGHT: 69 IN

## 2021-02-02 DIAGNOSIS — H72.92 PERFORATION OF LEFT TYMPANIC MEMBRANE: ICD-10-CM

## 2021-02-02 DIAGNOSIS — L03.211 CELLULITIS, FACE: Primary | ICD-10-CM

## 2021-02-02 PROCEDURE — 99214 OFFICE O/P EST MOD 30 MIN: CPT | Performed by: NURSE PRACTITIONER

## 2021-02-02 RX ORDER — CHLORHEXIDINE GLUCONATE 0.12 MG/ML
RINSE ORAL
COMMUNITY
Start: 2021-01-06 | End: 2021-02-02

## 2021-02-02 RX ORDER — CIPROFLOXACIN AND DEXAMETHASONE 3; 1 MG/ML; MG/ML
4 SUSPENSION/ DROPS AURICULAR (OTIC) 2 TIMES DAILY
Qty: 7.5 ML | Refills: 0 | Status: SHIPPED | OUTPATIENT
Start: 2021-02-02 | End: 2022-02-18

## 2021-02-02 RX ORDER — SULFAMETHOXAZOLE AND TRIMETHOPRIM 800; 160 MG/1; MG/1
1 TABLET ORAL 2 TIMES DAILY
Qty: 20 TABLET | Refills: 0 | Status: SHIPPED | OUTPATIENT
Start: 2021-02-02 | End: 2021-02-12

## 2021-02-02 NOTE — PROGRESS NOTES
Subjective   Bradley Mukherjee is a 30 y.o. male    Chief Complaint   Patient presents with   • Earache     left ear    • Facial Swelling     left side of face swollen, pus coming out close to ear      History of Present Illness     Patient presents with complaints of swelling on the left side of the face.  States that he had a very large boil anterior to the ear that began 3 to 4 days ago.  Last night the boil appeared to be full of drainage.  He used a sterile needle and popped the boil.  States that a great deal of thick yellow discharge drained out of the area.  The swelling immediately improved as did the pain.  He denies fever chills.  No other associated signs or symptoms.    The following portions of the patient's history were reviewed and updated as appropriate: allergies, current medications, past family history, past medical history, past social history, past surgical history and problem list.    Current Outpatient Medications:   •  atorvastatin (LIPITOR) 80 MG tablet, Take 1 tablet by mouth Every Night., Disp: 90 tablet, Rfl: 1  •  FLUoxetine (PROzac) 40 MG capsule, Take 1 capsule by mouth Daily., Disp: 90 capsule, Rfl: 1  •  metoprolol succinate XL (TOPROL-XL) 25 MG 24 hr tablet, Take 1 tablet by mouth Daily., Disp: 90 tablet, Rfl: 1  •  ciprofloxacin-dexamethasone (Ciprodex) 0.3-0.1 % otic suspension, Administer 4 drops into the left ear 2 (Two) Times a Day., Disp: 7.5 mL, Rfl: 0  •  mupirocin (Bactroban) 2 % ointment, Apply  topically to the appropriate area as directed 3 (Three) Times a Day., Disp: 22 g, Rfl: 0  •  sulfamethoxazole-trimethoprim (Bactrim DS) 800-160 MG per tablet, Take 1 tablet by mouth 2 (Two) Times a Day for 10 days., Disp: 20 tablet, Rfl: 0     Review of Systems   Constitutional: Negative for chills, fatigue and fever.   HENT: Positive for ear pain and facial swelling. Negative for hearing loss.    Respiratory: Negative for cough, chest tightness and shortness of breath.   "  Cardiovascular: Negative for chest pain.   Gastrointestinal: Negative for abdominal pain, diarrhea, nausea and vomiting.   Endocrine: Negative for cold intolerance and heat intolerance.   Musculoskeletal: Negative for arthralgias.   Neurological: Negative for dizziness.       Objective   Physical Exam  Constitutional:       Appearance: He is well-developed.   HENT:      Head: Normocephalic and atraumatic.        Right Ear: Hearing, tympanic membrane, ear canal and external ear normal. There is no impacted cerumen.      Left Ear: Hearing, ear canal and external ear normal. There is no impacted cerumen. Tympanic membrane is perforated.   Eyes:      Conjunctiva/sclera: Conjunctivae normal.      Pupils: Pupils are equal, round, and reactive to light.   Neck:      Musculoskeletal: Normal range of motion.   Cardiovascular:      Rate and Rhythm: Normal rate and regular rhythm.      Heart sounds: Normal heart sounds.   Pulmonary:      Effort: Pulmonary effort is normal.      Breath sounds: Normal breath sounds.   Abdominal:      General: Bowel sounds are normal.      Palpations: Abdomen is soft.   Musculoskeletal: Normal range of motion.   Skin:     General: Skin is warm and dry.   Neurological:      Mental Status: He is alert and oriented to person, place, and time.      Deep Tendon Reflexes: Reflexes are normal and symmetric.   Psychiatric:         Behavior: Behavior normal.         Thought Content: Thought content normal.         Judgment: Judgment normal.       Vitals:    02/02/21 1016   BP: 120/88   Pulse: 91   Temp: 96.9 °F (36.1 °C)   TempSrc: Temporal   SpO2: 95%   Weight: 104 kg (229 lb 12.8 oz)   Height: 175.3 cm (69.02\")         Assessment/Plan   Diagnoses and all orders for this visit:    1. Cellulitis, face (Primary)  -     sulfamethoxazole-trimethoprim (Bactrim DS) 800-160 MG per tablet; Take 1 tablet by mouth 2 (Two) Times a Day for 10 days.  Dispense: 20 tablet; Refill: 0  -     mupirocin (Bactroban) 2 % " ointment; Apply  topically to the appropriate area as directed 3 (Three) Times a Day.  Dispense: 22 g; Refill: 0    2. Perforation of left tympanic membrane  -     ciprofloxacin-dexamethasone (Ciprodex) 0.3-0.1 % otic suspension; Administer 4 drops into the left ear 2 (Two) Times a Day.  Dispense: 7.5 mL; Refill: 0      Bactroban and Bactrim p.o. as directed  Recommended warm compresses to the area 2-3 times per day.  Keep area clean and dry  Incidental finding of ruptured TM on the left  Return in about 2 weeks (around 2/16/2021).

## 2021-03-30 DIAGNOSIS — E78.2 MIXED HYPERLIPIDEMIA: ICD-10-CM

## 2021-03-30 RX ORDER — ATORVASTATIN CALCIUM 80 MG/1
TABLET, FILM COATED ORAL
Qty: 30 TABLET | Refills: 0 | Status: SHIPPED | OUTPATIENT
Start: 2021-03-30 | End: 2021-07-13 | Stop reason: SDUPTHER

## 2021-07-13 ENCOUNTER — TELEMEDICINE (OUTPATIENT)
Dept: INTERNAL MEDICINE | Facility: CLINIC | Age: 31
End: 2021-07-13

## 2021-07-13 DIAGNOSIS — R05.9 COUGH: Primary | ICD-10-CM

## 2021-07-13 DIAGNOSIS — F41.9 ANXIETY: ICD-10-CM

## 2021-07-13 DIAGNOSIS — H72.92 PERFORATION OF LEFT TYMPANIC MEMBRANE: ICD-10-CM

## 2021-07-13 DIAGNOSIS — E78.2 MIXED HYPERLIPIDEMIA: ICD-10-CM

## 2021-07-13 DIAGNOSIS — R09.89 CHEST CONGESTION: ICD-10-CM

## 2021-07-13 PROCEDURE — 99214 OFFICE O/P EST MOD 30 MIN: CPT | Performed by: NURSE PRACTITIONER

## 2021-07-13 RX ORDER — BROMPHENIRAMINE MALEATE, PSEUDOEPHEDRINE HYDROCHLORIDE, AND DEXTROMETHORPHAN HYDROBROMIDE 2; 30; 10 MG/5ML; MG/5ML; MG/5ML
10 SYRUP ORAL 4 TIMES DAILY PRN
Qty: 300 ML | Refills: 0 | Status: SHIPPED | OUTPATIENT
Start: 2021-07-13 | End: 2022-02-18

## 2021-07-14 ENCOUNTER — CLINICAL SUPPORT (OUTPATIENT)
Dept: INTERNAL MEDICINE | Facility: CLINIC | Age: 31
End: 2021-07-14

## 2021-07-14 DIAGNOSIS — R05.9 COUGH: ICD-10-CM

## 2021-07-14 DIAGNOSIS — R09.89 CHEST CONGESTION: ICD-10-CM

## 2021-07-14 LAB — SARS-COV-2 RNA NOSE QL NAA+PROBE: NOT DETECTED

## 2021-07-14 PROCEDURE — U0004 COV-19 TEST NON-CDC HGH THRU: HCPCS | Performed by: NURSE PRACTITIONER

## 2021-07-15 ENCOUNTER — TELEPHONE (OUTPATIENT)
Dept: INTERNAL MEDICINE | Facility: CLINIC | Age: 31
End: 2021-07-15

## 2021-07-15 RX ORDER — CIPROFLOXACIN AND DEXAMETHASONE 3; 1 MG/ML; MG/ML
4 SUSPENSION/ DROPS AURICULAR (OTIC) 2 TIMES DAILY
Qty: 7.5 ML | Refills: 0 | OUTPATIENT
Start: 2021-07-15

## 2021-07-15 RX ORDER — FLUOXETINE HYDROCHLORIDE 40 MG/1
40 CAPSULE ORAL DAILY
Qty: 90 CAPSULE | Refills: 1 | Status: SHIPPED | OUTPATIENT
Start: 2021-07-15 | End: 2022-02-14 | Stop reason: SDUPTHER

## 2021-07-15 RX ORDER — ATORVASTATIN CALCIUM 80 MG/1
80 TABLET, FILM COATED ORAL
Qty: 90 TABLET | Refills: 1 | Status: SHIPPED | OUTPATIENT
Start: 2021-07-15 | End: 2022-02-14 | Stop reason: SDUPTHER

## 2021-07-15 NOTE — TELEPHONE ENCOUNTER
"LVM on personal vm, \"just wanted you to know that your test is negative, if any questions please feel free to contact office. Office number given  "

## 2021-07-15 NOTE — TELEPHONE ENCOUNTER
----- Message from REGGIE Brunner sent at 7/15/2021  8:53 AM EDT -----  COVID-19 test was negative.

## 2022-01-13 ENCOUNTER — TELEMEDICINE (OUTPATIENT)
Dept: INTERNAL MEDICINE | Facility: CLINIC | Age: 32
End: 2022-01-13

## 2022-01-13 DIAGNOSIS — J02.9 SORE THROAT: ICD-10-CM

## 2022-01-13 DIAGNOSIS — R05.9 COUGH: Primary | ICD-10-CM

## 2022-01-13 DIAGNOSIS — R68.89 FLU-LIKE SYMPTOMS: ICD-10-CM

## 2022-01-13 PROCEDURE — 87804 INFLUENZA ASSAY W/OPTIC: CPT | Performed by: FAMILY MEDICINE

## 2022-01-13 PROCEDURE — 87880 STREP A ASSAY W/OPTIC: CPT | Performed by: FAMILY MEDICINE

## 2022-01-13 PROCEDURE — 99213 OFFICE O/P EST LOW 20 MIN: CPT | Performed by: FAMILY MEDICINE

## 2022-01-13 NOTE — PROGRESS NOTES
Mode of Visit: Video  Location of patient: home  You have chosen to receive care through a telehealth visit.  Does the patient consent to use a video/audio connection for your medical care today? Yes  The visit included audio and video interaction. No technical issues occurred during this visit.         Eduardo Mukherjee is a 31 y.o. male.     History of Present Illness     Video Visit was done today because of COVID-19.  patient has consented to receive care via Video Visit   Patient location ;home     CC; cough, chest tightness and ST     He is c/o Body ache , throat pain , cough /dry , chest tightness , HA for 3 days   This am tested neg for the COVID  NO F,C  +ve sick contact       Current Outpatient Medications on File Prior to Visit   Medication Sig Dispense Refill   • atorvastatin (LIPITOR) 80 MG tablet Take 1 tablet by mouth every night at bedtime. 90 tablet 1   • brompheniramine-pseudoephedrine-DM 30-2-10 MG/5ML syrup Take 10 mL by mouth 4 (Four) Times a Day As Needed for Congestion or Cough. 300 mL 0   • ciprofloxacin-dexamethasone (Ciprodex) 0.3-0.1 % otic suspension Administer 4 drops into the left ear 2 (Two) Times a Day. 7.5 mL 0   • FLUoxetine (PROzac) 40 MG capsule Take 1 capsule by mouth Daily. 90 capsule 1   • metoprolol succinate XL (TOPROL-XL) 25 MG 24 hr tablet Take 1 tablet by mouth Daily. 90 tablet 1   • mupirocin (Bactroban) 2 % ointment Apply  topically to the appropriate area as directed 3 (Three) Times a Day. 22 g 0     No current facility-administered medications on file prior to visit.       The following portions of the patient's history were reviewed and updated as appropriate: allergies, current medications, past family history, past medical history, past social history, past surgical history and problem list.    Review of Systems   Constitutional: Negative for chills and fever.   HENT: Positive for congestion, rhinorrhea and sore throat.    Respiratory: Positive for  cough. Negative for shortness of breath and wheezing.    Musculoskeletal: Positive for myalgias.       Objective   There were no vitals taken for this visit.  Physical Exam  Constitutional:       General: He is not in acute distress.     Appearance: He is not ill-appearing, toxic-appearing or diaphoretic.   Neurological:      Mental Status: He is alert and oriented to person, place, and time.   Psychiatric:         Mood and Affect: Mood normal.         Behavior: Behavior normal.         Thought Content: Thought content normal.           Assessment/Plan   Diagnoses and all orders for this visit:    1. Cough (Primary)  -     POC Influenza A / B  -     COVID-19 PCR, [facility], NP SWAB IN [facility] TRANSPORT MEDIA/ORAL SWISH 24-30 HR TAT - Swab, Oropharynx; Future    2. Flu-like symptoms  -     POC Influenza A / B  -     COVID-19 PCR, [facility], NP SWAB IN [facility] TRANSPORT MEDIA/ORAL SWISH 24-30 HR TAT - Swab, Oropharynx; Future    3. Sore throat  -     POC Rapid Strep A  -     COVID-19 PCR, [facility], NP SWAB IN [facility] TRANSPORT MEDIA/ORAL SWISH 24-30 HR TAT - Swab, Oropharynx; Future           Advised to;  Keep self well hydrated   Take tylenol as needed for pain or fever   For any worsening symptoms , needs to go to ER      I have reviewed the limitations of a telehealth visit (such as lack of a physical exam and unable to obtain vital signs) and advised the patient that they may need to follow up for an office visit should their symptoms or concerns persist, worsen, or change.  Patient was encouraged to keep me informed of any acute changes, lack of improvement, or any new concerning symptoms.   I discussed my findings,recommendations, and plan of care was with the patient. They verbalized understanding and agreement.

## 2022-01-13 NOTE — PATIENT INSTRUCTIONS
Cough, Adult  A cough helps to clear your throat and lungs. A cough may be a sign of an illness or another medical condition.  An acute cough may only last 2-3 weeks, while a chronic cough may last 8 or more weeks.  Many things can cause a cough. They include:  · Germs (viruses or bacteria) that attack the airway.  · Breathing in things that bother (irritate) your lungs.  · Allergies.  · Asthma.  · Mucus that runs down the back of your throat (postnasal drip).  · Smoking.  · Acid backing up from the stomach into the tube that moves food from the mouth to the stomach (gastroesophageal reflux).  · Some medicines.  · Lung problems.  · Other medical conditions, such as heart failure or a blood clot in the lung (pulmonary embolism).  Follow these instructions at home:  Medicines  · Take over-the-counter and prescription medicines only as told by your doctor.  · Talk with your doctor before you take medicines that stop a cough (cough suppressants).  Lifestyle    · Do not smoke, and try not to be around smoke. Do not use any products that contain nicotine or tobacco, such as cigarettes, e-cigarettes, and chewing tobacco. If you need help quitting, ask your doctor.  · Drink enough fluid to keep your pee (urine) pale yellow.  · Avoid caffeine.  · Do not drink alcohol if your doctor tells you not to drink.    General instructions    · Watch for any changes in your cough. Tell your doctor about them.  · Always cover your mouth when you cough.  · Stay away from things that make you cough, such as perfume, candles, campfire smoke, or cleaning products.  · If the air is dry, use a cool mist vaporizer or humidifier in your home.  · If your cough is worse at night, try using extra pillows to raise your head up higher while you sleep.  · Rest as needed.  · Keep all follow-up visits as told by your doctor. This is important.    Contact a doctor if:  · You have new symptoms.  · You cough up pus.  · Your cough does not get better after  2-3 weeks, or your cough gets worse.  · Cough medicine does not help your cough and you are not sleeping well.  · You have pain that gets worse or pain that is not helped with medicine.  · You have a fever.  · You are losing weight and you do not know why.  · You have night sweats.  Get help right away if:  · You cough up blood.  · You have trouble breathing.  · Your heartbeat is very fast.  These symptoms may be an emergency. Do not wait to see if the symptoms will go away. Get medical help right away. Call your local emergency services (911 in the [place] Do not drive yourself to the hospital.  Summary  · A cough helps to clear your throat and lungs. Many things can cause a cough.  · Take over-the-counter and prescription medicines only as told by your doctor.  · Always cover your mouth when you cough.  · Contact a doctor if you have new symptoms or you have a cough that does not get better or gets worse.  This information is not intended to replace advice given to you by your health care provider. Make sure you discuss any questions you have with your health care provider.  Document Revised: 02/05/2021 Document Reviewed: 01/06/2020  Elsevier Patient Education © 2021 [employer]

## 2022-01-14 PROCEDURE — U0004 COV-19 TEST NON-CDC HGH THRU: HCPCS | Performed by: FAMILY MEDICINE

## 2022-01-17 ENCOUNTER — TELEPHONE (OUTPATIENT)
Dept: INTERNAL MEDICINE | Facility: CLINIC | Age: 32
End: 2022-01-17

## 2022-01-17 LAB
EXPIRATION DATE: NORMAL
EXPIRATION DATE: NORMAL
FLUAV AG NPH QL: NEGATIVE
FLUBV AG NPH QL: NEGATIVE
INTERNAL CONTROL: NORMAL
INTERNAL CONTROL: NORMAL
Lab: NORMAL
Lab: NORMAL
S PYO AG THROAT QL: NEGATIVE

## 2022-01-17 NOTE — TELEPHONE ENCOUNTER
Pt returning missed call regarding covid results, I let the pt know that his covid test was negative.   Please call pt when the flu and strep test results are back.

## 2022-01-17 NOTE — TELEPHONE ENCOUNTER
Was patient supposed to have flu and strep completed? If so they weren't resulted? Does patient need to be tested for flu and strep?

## 2022-02-14 DIAGNOSIS — E78.2 MIXED HYPERLIPIDEMIA: ICD-10-CM

## 2022-02-14 DIAGNOSIS — F41.9 ANXIETY: ICD-10-CM

## 2022-02-15 NOTE — TELEPHONE ENCOUNTER
LVM that he would need to make an appt first before we send in a temporary supply of rxs.     Office number provided.    HUB TO READ TO PT AND PLEASE HELP HIM GET SCHEDULED.

## 2022-02-17 RX ORDER — ATORVASTATIN CALCIUM 80 MG/1
80 TABLET, FILM COATED ORAL
Qty: 90 TABLET | Refills: 1 | Status: SHIPPED | OUTPATIENT
Start: 2022-02-17 | End: 2022-04-14 | Stop reason: SDUPTHER

## 2022-02-17 RX ORDER — FLUOXETINE HYDROCHLORIDE 40 MG/1
40 CAPSULE ORAL DAILY
Qty: 90 CAPSULE | Refills: 1 | Status: SHIPPED | OUTPATIENT
Start: 2022-02-17 | End: 2022-02-18

## 2022-02-18 ENCOUNTER — OFFICE VISIT (OUTPATIENT)
Dept: INTERNAL MEDICINE | Facility: CLINIC | Age: 32
End: 2022-02-18

## 2022-02-18 VITALS
DIASTOLIC BLOOD PRESSURE: 90 MMHG | SYSTOLIC BLOOD PRESSURE: 122 MMHG | OXYGEN SATURATION: 96 % | WEIGHT: 238 LBS | BODY MASS INDEX: 35.25 KG/M2 | HEART RATE: 93 BPM | RESPIRATION RATE: 20 BRPM | HEIGHT: 69 IN | TEMPERATURE: 96.8 F

## 2022-02-18 DIAGNOSIS — E66.9 CLASS 2 OBESITY WITHOUT SERIOUS COMORBIDITY WITH BODY MASS INDEX (BMI) OF 35.0 TO 35.9 IN ADULT, UNSPECIFIED OBESITY TYPE: ICD-10-CM

## 2022-02-18 DIAGNOSIS — R06.83 SNORING: ICD-10-CM

## 2022-02-18 DIAGNOSIS — G47.9 SLEEP DISTURBANCE: Primary | ICD-10-CM

## 2022-02-18 DIAGNOSIS — F41.9 ANXIETY AND DEPRESSION: Chronic | ICD-10-CM

## 2022-02-18 DIAGNOSIS — F32.A ANXIETY AND DEPRESSION: Chronic | ICD-10-CM

## 2022-02-18 PROCEDURE — 99214 OFFICE O/P EST MOD 30 MIN: CPT | Performed by: NURSE PRACTITIONER

## 2022-02-18 RX ORDER — FLUOXETINE HYDROCHLORIDE 20 MG/1
20 CAPSULE ORAL DAILY
Qty: 30 CAPSULE | Refills: 2 | Status: SHIPPED | OUTPATIENT
Start: 2022-02-18 | End: 2022-04-14 | Stop reason: SDUPTHER

## 2022-02-18 NOTE — PROGRESS NOTES
"Subjective   Bradley Mukherjee is a 31 y.o. male    Chief Complaint   Patient presents with   • Snoring     has been getting worse for the past year   • Fatigue     he states he can fall asleep while driving   • Sleep Apnea     states girl friend witnessed him stopped breathing several times    • Anxiety     states that the prozac makes him feel funny wants to discuss     History of Present Illness     Pt presents with concerns of sleep apnea.  His GF states that he snores very loud and she has noted pauses in his breathing at night as well.  He states that he always feels tired.  He falls asleep as soon as he sits down.  Has not fallen asleep driving, but he does feel drowsy while driving.  He would like to be tested for MARI    Anxiety - pt's current regimen is prozac 40 mg daily, but he feels that this makes him feel \"funny.\"  States that he can tell when he takes this and he thinks that me may need a lower dose.      The following portions of the patient's history were reviewed and updated as appropriate: allergies, current medications, past family history, past medical history, past social history, past surgical history and problem list.    Current Outpatient Medications:   •  atorvastatin (LIPITOR) 80 MG tablet, Take 1 tablet by mouth every night at bedtime., Disp: 90 tablet, Rfl: 1  •  metoprolol succinate XL (TOPROL-XL) 25 MG 24 hr tablet, Take 1 tablet by mouth Daily., Disp: 90 tablet, Rfl: 1  •  FLUoxetine (PROzac) 20 MG capsule, Take 1 capsule by mouth Daily., Disp: 30 capsule, Rfl: 2     Review of Systems   Constitutional: Negative for chills, fatigue and fever.   Respiratory: Negative for cough, chest tightness and shortness of breath.    Cardiovascular: Negative for chest pain.   Gastrointestinal: Negative for abdominal pain, diarrhea, nausea and vomiting.   Endocrine: Negative for cold intolerance and heat intolerance.   Musculoskeletal: Negative for arthralgias.   Neurological: Negative for " "dizziness.   Psychiatric/Behavioral: Positive for sleep disturbance.       Objective   Physical Exam  Constitutional:       Appearance: He is well-developed.   HENT:      Head: Normocephalic and atraumatic.   Eyes:      Conjunctiva/sclera: Conjunctivae normal.      Pupils: Pupils are equal, round, and reactive to light.   Cardiovascular:      Rate and Rhythm: Normal rate and regular rhythm.      Heart sounds: Normal heart sounds.   Pulmonary:      Effort: Pulmonary effort is normal.      Breath sounds: Normal breath sounds.   Abdominal:      General: Bowel sounds are normal.      Palpations: Abdomen is soft.   Musculoskeletal:         General: Normal range of motion.      Cervical back: Normal range of motion.   Skin:     General: Skin is warm and dry.   Neurological:      Mental Status: He is alert and oriented to person, place, and time.      Deep Tendon Reflexes: Reflexes are normal and symmetric.   Psychiatric:         Behavior: Behavior normal.         Thought Content: Thought content normal.         Judgment: Judgment normal.       Vitals:    02/18/22 1005   BP: 122/90   Cuff Size: Adult   Pulse: 93   Resp: 20   Temp: 96.8 °F (36 °C)   TempSrc: Infrared   SpO2: 96%   Weight: 108 kg (238 lb)   Height: 175.3 cm (69\")         Assessment/Plan   Diagnoses and all orders for this visit:    1. Sleep disturbance (Primary)  -     Ambulatory Referral to Sleep Medicine    2. Snoring  -     Ambulatory Referral to Sleep Medicine    3. Class 2 obesity without serious comorbidity with body mass index (BMI) of 35.0 to 35.9 in adult, unspecified obesity type  -     Ambulatory Referral to Sleep Medicine    4. Anxiety and depression  -     FLUoxetine (PROzac) 20 MG capsule; Take 1 capsule by mouth Daily.  Dispense: 30 capsule; Refill: 2    Referred to sleep med  We will decrease Prozac to 20 mg daily.  Return in about 4 weeks (around 3/18/2022) for Annual.             "

## 2022-03-11 ENCOUNTER — OFFICE VISIT (OUTPATIENT)
Dept: SLEEP MEDICINE | Facility: CLINIC | Age: 32
End: 2022-03-11

## 2022-03-11 ENCOUNTER — PATIENT ROUNDING (BHMG ONLY) (OUTPATIENT)
Dept: SLEEP MEDICINE | Facility: HOSPITAL | Age: 32
End: 2022-03-11

## 2022-03-11 VITALS
WEIGHT: 228.2 LBS | HEART RATE: 86 BPM | DIASTOLIC BLOOD PRESSURE: 88 MMHG | OXYGEN SATURATION: 96 % | SYSTOLIC BLOOD PRESSURE: 140 MMHG | BODY MASS INDEX: 33.8 KG/M2 | HEIGHT: 69 IN

## 2022-03-11 DIAGNOSIS — E66.09 CLASS 1 OBESITY DUE TO EXCESS CALORIES WITHOUT SERIOUS COMORBIDITY WITH BODY MASS INDEX (BMI) OF 33.0 TO 33.9 IN ADULT: ICD-10-CM

## 2022-03-11 DIAGNOSIS — F51.04 PSYCHOPHYSIOLOGICAL INSOMNIA: ICD-10-CM

## 2022-03-11 DIAGNOSIS — R06.83 SNORING: Primary | ICD-10-CM

## 2022-03-11 DIAGNOSIS — G47.33 OBSTRUCTIVE SLEEP APNEA, ADULT: ICD-10-CM

## 2022-03-11 PROCEDURE — 99203 OFFICE O/P NEW LOW 30 MIN: CPT | Performed by: INTERNAL MEDICINE

## 2022-03-11 NOTE — PROGRESS NOTES
March 11, 2022    Hello, may I speak with Bradley Mukherjee?    My name is Macey    I am  with [facility]  [address]  [facility] [address] [phone].    Before we get started may I verify your date of birth? 1990    I am calling to officially welcome you to our practice and ask about your recent visit. Is this a good time to talk? Yes    Tell me about your visit with us. What things went well?  It went really well, Dr. Segovia did a good job at explaining everything.        We're always looking for ways to make our patients' experiences even better. Do you have recommendations on ways we may improve?  No    Overall were you satisfied with your first visit to our practice? Yes       I appreciate you taking the time to speak with me today. Is there anything else I can do for you? No    Thank you, and have a great day.

## 2022-03-23 ENCOUNTER — HOSPITAL ENCOUNTER (OUTPATIENT)
Dept: SLEEP MEDICINE | Facility: HOSPITAL | Age: 32
Discharge: HOME | End: 2022-03-23
Admitting: INTERNAL MEDICINE

## 2022-03-23 DIAGNOSIS — R06.83 SNORING: ICD-10-CM

## 2022-03-23 DIAGNOSIS — G47.33 OBSTRUCTIVE SLEEP APNEA, ADULT: ICD-10-CM

## 2022-03-23 PROCEDURE — 95800 SLP STDY UNATTENDED: CPT

## 2022-03-23 PROCEDURE — 95800 SLP STDY UNATTENDED: CPT | Performed by: INTERNAL MEDICINE

## 2022-03-25 NOTE — PROGRESS NOTES
Chief Complaint  Snoring and nonrestorative sleep    Subjective         Bradley Mukherjee presents to [facility] for evaluation of snoring and nonrestorative sleep.  His primary care provider is REGGIE Hendrix.  He is seen in person in the sleep clinic.  History of Present Illness  Patient says he been told he snores loudly and has had apneas noted.  He has had snoring noted for at least 5 years.  He has had apneas noted for about 3 years.  He denies awakening gasping for breath.  He is not rested on arising in the morning.  He has a morning headache 3 days/week.    He will awaken with a dry mouth at night.  He denies ever breaking his nose.  He denies having trouble breathing through his nose but does admit to nasal allergies.  He is sleepy if sitting quietly during the day and will fall asleep.  He denies problems driving.  He denies kicking or jerking his legs at night.  He does have some chronic pain but says that it does not keep him awake.    He has been about 10 PM.  He will fall asleep 2 to 3 hours.  He says he awakens 2-3 times during the night.  He is not rested.  He gets about 7 to 8 hours of sleep.  He will also naps about 30 minutes during the day.  He has had hypertension none for about a year.  He denies any history of diabetes.  He had a history of a TIA 2 years ago when he was using energy drinks.    Allergies: He has seasonal environmental allergies    Habits: Smoking: He smoked a pack and half per day for 10 years    Alcohol: He has 1 drink 2-4 times per month.    Caffeine: He has 2 to 3--20 ounce bottles of cola per day    Medical illnesses: He had a history of TIA and hypertension    Medications: Atorvastatin, fluoxetine, metoprolol.    Surgeries: He had full mouth extraction    Family history: He was adopted    Review of systems: All systems were reviewed and reported negative.    Wynnewood score is 13/24  Objective   Vital Signs:   /88 (BP  "Location: Left arm, Patient Position: Sitting, Cuff Size: Adult)   Pulse 86   Ht 175.3 cm (69\")   Wt 104 kg (228 lb 3.2 oz)   SpO2 96%   BMI 33.70 kg/m²           Physical Exam patient appears to be awake and alert.  He is not appear to be in acute respiratory distress.    He is normocephalic.  He has Mallampati class III anatomy.  He is edentulous    Lungs are clear.    Cardiac exam revealed normal S1-S2.    Extremities showed no edema.  Result Review :         Assessment and Plan  Diagnoses and all orders for this visit:    1. Snoring (Primary)  -     Home Sleep Study; Future    2. Obstructive sleep apnea, adult  -     Home Sleep Study; Future    3. Psychophysiological insomnia    4. Class 1 obesity due to excess calories without serious comorbidity with body mass index (BMI) of 33.0 to 33.9 in adult    Patient has a history of snoring observed apneas.  He gives an excellent story for obstructive sleep apnea.  We will plan proceed to home sleep testing.  We have discussed potential therapies including CPAP, weight control, oral appliance, and surgery.  We have discussed the consequences of long-term untreated obstructive sleep apnea including hypertension, diabetes, heart disease, stroke, and dementia.  He is encouraged to lose weight.  He is encouraged to avoid alcohol and sedatives close to bedtime.  He is encouraged practice lateral position sleep.    Patient also seems to have some significant psychophysiologic insomnia.  We discussed measures to improve sleep hygiene.  He is get a regular rise time and regular bedtime.  He is get light exposure with a help with his circadian rhythm.  He is encouraged to try get exercise on a regular basis.  He is to cut off caffeine by mid afternoon.  He has tried avoid alcohol close to bedtime.  He has developed a nighttime ritual and get off all electronic screens at least an hour before the time he wishes to be asleep.  These issues will need to be addressed further " on his return  I spent 35 minutes caring for Bradley on this date of service. This time includes time spent by me in the following activities:obtaining and/or reviewing a separately obtained history, performing a medically appropriate examination and/or evaluation , counseling and educating the patient/family/caregiver, ordering medications, tests, or procedures and documenting information in the medical record  Follow Up   Return for Follow up after study, Next scheduled follow-up.  Patient was given instructions and counseling regarding his condition or for health maintenance advice. Please see specific information pulled into the AVS if appropriate.   Jorge Segovia MD [facility]  Sleep Medicine  Pulmonary and Critical Care Medicine

## 2022-04-14 ENCOUNTER — OFFICE VISIT (OUTPATIENT)
Dept: INTERNAL MEDICINE | Facility: CLINIC | Age: 32
End: 2022-04-14

## 2022-04-14 VITALS
WEIGHT: 227 LBS | OXYGEN SATURATION: 97 % | RESPIRATION RATE: 20 BRPM | HEIGHT: 69 IN | BODY MASS INDEX: 33.62 KG/M2 | DIASTOLIC BLOOD PRESSURE: 100 MMHG | SYSTOLIC BLOOD PRESSURE: 144 MMHG | TEMPERATURE: 97 F | HEART RATE: 102 BPM

## 2022-04-14 DIAGNOSIS — E55.9 VITAMIN D DEFICIENCY: ICD-10-CM

## 2022-04-14 DIAGNOSIS — F32.A ANXIETY AND DEPRESSION: Chronic | ICD-10-CM

## 2022-04-14 DIAGNOSIS — I10 ESSENTIAL HYPERTENSION: ICD-10-CM

## 2022-04-14 DIAGNOSIS — F41.9 ANXIETY AND DEPRESSION: Chronic | ICD-10-CM

## 2022-04-14 DIAGNOSIS — Z00.00 ANNUAL PHYSICAL EXAM: Primary | ICD-10-CM

## 2022-04-14 DIAGNOSIS — E78.49 OTHER HYPERLIPIDEMIA: ICD-10-CM

## 2022-04-14 DIAGNOSIS — E78.2 MIXED HYPERLIPIDEMIA: ICD-10-CM

## 2022-04-14 LAB
BILIRUB BLD-MCNC: NEGATIVE MG/DL
CLARITY, POC: CLEAR
COLOR UR: ABNORMAL
EXPIRATION DATE: ABNORMAL
GLUCOSE UR STRIP-MCNC: ABNORMAL MG/DL
KETONES UR QL: NEGATIVE
LEUKOCYTE EST, POC: NEGATIVE
Lab: ABNORMAL
NITRITE UR-MCNC: NEGATIVE MG/ML
PH UR: 5.5 [PH] (ref 5–8)
PROT UR STRIP-MCNC: NEGATIVE MG/DL
RBC # UR STRIP: NEGATIVE /UL
SP GR UR: 1.02 (ref 1–1.03)
UROBILINOGEN UR QL: NORMAL

## 2022-04-14 PROCEDURE — 3008F BODY MASS INDEX DOCD: CPT | Performed by: NURSE PRACTITIONER

## 2022-04-14 PROCEDURE — 81003 URINALYSIS AUTO W/O SCOPE: CPT | Performed by: NURSE PRACTITIONER

## 2022-04-14 PROCEDURE — 99395 PREV VISIT EST AGE 18-39: CPT | Performed by: NURSE PRACTITIONER

## 2022-04-14 PROCEDURE — 2014F MENTAL STATUS ASSESS: CPT | Performed by: NURSE PRACTITIONER

## 2022-04-14 RX ORDER — FLUOXETINE HYDROCHLORIDE 20 MG/1
20 CAPSULE ORAL DAILY
Qty: 90 CAPSULE | Refills: 1 | Status: SHIPPED | OUTPATIENT
Start: 2022-04-14 | End: 2022-12-22 | Stop reason: SDUPTHER

## 2022-04-14 RX ORDER — ATORVASTATIN CALCIUM 80 MG/1
80 TABLET, FILM COATED ORAL
Qty: 90 TABLET | Refills: 1 | Status: SHIPPED | OUTPATIENT
Start: 2022-04-14 | End: 2022-12-22 | Stop reason: SDUPTHER

## 2022-04-14 RX ORDER — METOPROLOL SUCCINATE 25 MG/1
25 TABLET, EXTENDED RELEASE ORAL DAILY
Qty: 90 TABLET | Refills: 1 | Status: SHIPPED | OUTPATIENT
Start: 2022-04-14 | End: 2022-12-22 | Stop reason: SDUPTHER

## 2022-04-14 NOTE — PROGRESS NOTES
Subjective   Bradley Mukherjee is a 31 y.o. male    Chief Complaint   Patient presents with   • Annual Exam     History of Present Illness     Here for annual exam    HTN - chronic; pt has not been taking Metoprolol for 2 months.  He ran out and did not call for refills    HL - chronic and stable on Lipitor 80 mg daily, he is not fasting for labs    Anxiety/depression - chronic and stable on Prozac 20 mg daily.      Had recent sleep study.  Has f/u with them next week to get restuls    COVID - declines  Flu - declines  Tdap - 1/2018  Pneumovax - declines  Hep C screen - 2/2020    Diet - has been eating healthier and has lost about 11 lbs since 2/22    Exercise - very active    Social History     Socioeconomic History   • Marital status: Single   Tobacco Use   • Smoking status: Current Every Day Smoker     Packs/day: 0.50     Years: 15.00     Pack years: 7.50   • Smokeless tobacco: Never Used   • Tobacco comment: Tried patches. But they did not help.   Vaping Use   • Vaping Use: Never used   Substance and Sexual Activity   • Alcohol use: Yes     Comment: VERY SELDOM   • Drug use: Yes     Types: Marijuana, Cocaine(coke)     Comment: LAST DRUG USE 2012   • Sexual activity: Defer         The following portions of the patient's history were reviewed and updated as appropriate: allergies, current medications, past family history, past medical history, past social history, past surgical history and problem list.    Current Outpatient Medications:   •  atorvastatin (LIPITOR) 80 MG tablet, Take 1 tablet by mouth every night at bedtime., Disp: 90 tablet, Rfl: 1  •  FLUoxetine (PROzac) 20 MG capsule, Take 1 capsule by mouth Daily., Disp: 90 capsule, Rfl: 1  •  metoprolol succinate XL (TOPROL-XL) 25 MG 24 hr tablet, Take 1 tablet by mouth Daily., Disp: 90 tablet, Rfl: 1     Review of Systems   Constitutional: Negative for appetite change, chills, fatigue, fever and unexpected weight change.   HENT: Negative for congestion,  ear pain, nosebleeds, rhinorrhea and tinnitus.    Eyes: Negative for pain.   Respiratory: Negative for cough, chest tightness and shortness of breath.    Cardiovascular: Negative for chest pain, palpitations and leg swelling.   Gastrointestinal: Negative for abdominal distention, abdominal pain, blood in stool, constipation, diarrhea, nausea and vomiting.   Endocrine: Negative for cold intolerance, heat intolerance, polydipsia, polyphagia and polyuria.   Genitourinary: Negative for dysuria, flank pain, frequency, hematuria and urgency.   Musculoskeletal: Negative for arthralgias, back pain, gait problem, joint swelling, myalgias and neck pain.   Skin: Negative for color change, pallor, rash and wound.   Allergic/Immunologic: Negative for environmental allergies and food allergies.   Neurological: Negative for dizziness, syncope, weakness, light-headedness, numbness and headaches.   Hematological: Negative for adenopathy. Does not bruise/bleed easily.   Psychiatric/Behavioral: Negative for behavioral problems and suicidal ideas. The patient is not nervous/anxious.        Objective   Physical Exam  Constitutional:       Appearance: He is well-developed and normal weight.   HENT:      Head: Normocephalic and atraumatic.      Right Ear: External ear normal.      Left Ear: External ear normal.      Nose: Nose normal.      Mouth/Throat:      Mouth: Mucous membranes are moist.      Pharynx: Oropharynx is clear.   Eyes:      General: Lids are normal.      Conjunctiva/sclera: Conjunctivae normal.      Pupils: Pupils are equal, round, and reactive to light.   Neck:      Vascular: No carotid bruit.   Cardiovascular:      Rate and Rhythm: Normal rate and regular rhythm.      Pulses: Normal pulses.      Heart sounds: Normal heart sounds. No murmur heard.  Pulmonary:      Effort: Pulmonary effort is normal.      Breath sounds: Normal breath sounds.   Abdominal:      General: Bowel sounds are normal.      Palpations: Abdomen is  "soft. There is no hepatomegaly or splenomegaly.      Hernia: No hernia is present.   Musculoskeletal:         General: Normal range of motion.      Cervical back: Normal range of motion and neck supple.   Lymphadenopathy:      Cervical: No cervical adenopathy.   Skin:     General: Skin is warm and dry.      Capillary Refill: Capillary refill takes less than 2 seconds.   Neurological:      Mental Status: He is alert and oriented to person, place, and time.      Deep Tendon Reflexes: Reflexes are normal and symmetric.   Psychiatric:         Mood and Affect: Mood normal.         Behavior: Behavior normal.         Thought Content: Thought content normal.         Judgment: Judgment normal.       Vitals:    04/14/22 1537   BP: 144/100   Cuff Size: Adult   Pulse: 102   Resp: 20   Temp: 97 °F (36.1 °C)   TempSrc: Infrared   SpO2: 97%   Weight: 103 kg (227 lb)   Height: 174.8 cm (68.8\")         Assessment/Plan   Diagnoses and all orders for this visit:    1. Annual physical exam (Primary)  -     POCT urinalysis dipstick, automated  -     CBC & Differential; Future  -     Comprehensive Metabolic Panel; Future  -     Lipid Panel; Future  -     TSH; Future  -     Vitamin B12; Future  -     Vitamin D 25 Hydroxy; Future    2. Essential hypertension  -     metoprolol succinate XL (TOPROL-XL) 25 MG 24 hr tablet; Take 1 tablet by mouth Daily.  Dispense: 90 tablet; Refill: 1  -     CBC & Differential; Future  -     Comprehensive Metabolic Panel; Future  -     Lipid Panel; Future  -     TSH; Future  -     Vitamin B12; Future  -     Vitamin D 25 Hydroxy; Future    3. Other hyperlipidemia  -     CBC & Differential; Future  -     Comprehensive Metabolic Panel; Future  -     Lipid Panel; Future  -     TSH; Future  -     Vitamin B12; Future  -     Vitamin D 25 Hydroxy; Future    4. Anxiety and depression  -     FLUoxetine (PROzac) 20 MG capsule; Take 1 capsule by mouth Daily.  Dispense: 90 capsule; Refill: 1  -     CBC & Differential; " Future  -     Comprehensive Metabolic Panel; Future  -     Lipid Panel; Future  -     TSH; Future  -     Vitamin B12; Future  -     Vitamin D 25 Hydroxy; Future    5. Vitamin D deficiency  -     CBC & Differential; Future  -     Comprehensive Metabolic Panel; Future  -     Lipid Panel; Future  -     TSH; Future  -     Vitamin B12; Future  -     Vitamin D 25 Hydroxy; Future    6. Mixed hyperlipidemia  -     atorvastatin (LIPITOR) 80 MG tablet; Take 1 tablet by mouth every night at bedtime.  Dispense: 90 tablet; Refill: 1      Restart metoprolol ASAP  Labs sent  Prozac refilled  Lipitor refilled  Keep close follow-up with sleep medicine for possible CPAP  Counseling-diet and exercise  Return in about 6 months (around 10/14/2022) for f/u.

## 2022-04-18 ENCOUNTER — OFFICE VISIT (OUTPATIENT)
Dept: SLEEP MEDICINE | Facility: CLINIC | Age: 32
End: 2022-04-18

## 2022-04-18 VITALS
DIASTOLIC BLOOD PRESSURE: 96 MMHG | BODY MASS INDEX: 32.85 KG/M2 | HEIGHT: 69 IN | WEIGHT: 221.8 LBS | OXYGEN SATURATION: 96 % | SYSTOLIC BLOOD PRESSURE: 154 MMHG | HEART RATE: 83 BPM

## 2022-04-18 DIAGNOSIS — E66.09 CLASS 1 OBESITY DUE TO EXCESS CALORIES WITHOUT SERIOUS COMORBIDITY WITH BODY MASS INDEX (BMI) OF 32.0 TO 32.9 IN ADULT: ICD-10-CM

## 2022-04-18 DIAGNOSIS — G47.33 OBSTRUCTIVE SLEEP APNEA, ADULT: Primary | ICD-10-CM

## 2022-04-18 PROCEDURE — 99213 OFFICE O/P EST LOW 20 MIN: CPT | Performed by: INTERNAL MEDICINE

## 2022-04-22 ENCOUNTER — LAB (OUTPATIENT)
Dept: LAB | Facility: HOSPITAL | Age: 32
End: 2022-04-22

## 2022-04-22 DIAGNOSIS — I10 ESSENTIAL HYPERTENSION: ICD-10-CM

## 2022-04-22 DIAGNOSIS — F32.A ANXIETY AND DEPRESSION: Chronic | ICD-10-CM

## 2022-04-22 DIAGNOSIS — E78.49 OTHER HYPERLIPIDEMIA: ICD-10-CM

## 2022-04-22 DIAGNOSIS — Z00.00 ANNUAL PHYSICAL EXAM: ICD-10-CM

## 2022-04-22 DIAGNOSIS — F41.9 ANXIETY AND DEPRESSION: Chronic | ICD-10-CM

## 2022-04-22 DIAGNOSIS — E55.9 VITAMIN D DEFICIENCY: ICD-10-CM

## 2022-04-22 LAB
25(OH)D3+25(OH)D2 SERPL-MCNC: 16.1 NG/ML (ref 30–100)
ALBUMIN SERPL-MCNC: 4.3 G/DL (ref 3.5–5.2)
ALBUMIN/GLOB SERPL: 1.8 G/DL
ALP SERPL-CCNC: 145 U/L (ref 39–117)
ALT SERPL-CCNC: 65 U/L (ref 1–41)
AST SERPL-CCNC: 18 U/L (ref 1–40)
BASOPHILS # BLD AUTO: 0.03 10*3/MM3 (ref 0–0.2)
BASOPHILS NFR BLD AUTO: 0.5 % (ref 0–1.5)
BILIRUB SERPL-MCNC: 0.3 MG/DL (ref 0–1.2)
BUN SERPL-MCNC: 10 MG/DL (ref 6–20)
BUN/CREAT SERPL: 11 (ref 7–25)
CALCIUM SERPL-MCNC: 9 MG/DL (ref 8.6–10.5)
CHLORIDE SERPL-SCNC: 99 MMOL/L (ref 98–107)
CHOLEST SERPL-MCNC: 179 MG/DL (ref 0–200)
CO2 SERPL-SCNC: 24.7 MMOL/L (ref 22–29)
CREAT SERPL-MCNC: 0.91 MG/DL (ref 0.76–1.27)
EGFRCR SERPLBLD CKD-EPI 2021: 115.6 ML/MIN/1.73
EOSINOPHIL # BLD AUTO: 0.28 10*3/MM3 (ref 0–0.4)
EOSINOPHIL NFR BLD AUTO: 5.1 % (ref 0.3–6.2)
ERYTHROCYTE [DISTWIDTH] IN BLOOD BY AUTOMATED COUNT: 12.2 % (ref 12.3–15.4)
GLOBULIN SER CALC-MCNC: 2.4 GM/DL
GLUCOSE SERPL-MCNC: 208 MG/DL (ref 65–99)
HCT VFR BLD AUTO: 50.4 % (ref 37.5–51)
HDLC SERPL-MCNC: 26 MG/DL (ref 40–60)
HGB BLD-MCNC: 16.7 G/DL (ref 13–17.7)
IMM GRANULOCYTES # BLD AUTO: 0.02 10*3/MM3 (ref 0–0.05)
IMM GRANULOCYTES NFR BLD AUTO: 0.4 % (ref 0–0.5)
LDLC SERPL CALC-MCNC: 92 MG/DL (ref 0–100)
LYMPHOCYTES # BLD AUTO: 1.4 10*3/MM3 (ref 0.7–3.1)
LYMPHOCYTES NFR BLD AUTO: 25.3 % (ref 19.6–45.3)
MCH RBC QN AUTO: 30.9 PG (ref 26.6–33)
MCHC RBC AUTO-ENTMCNC: 33.1 G/DL (ref 31.5–35.7)
MCV RBC AUTO: 93.2 FL (ref 79–97)
MONOCYTES # BLD AUTO: 0.71 10*3/MM3 (ref 0.1–0.9)
MONOCYTES NFR BLD AUTO: 12.8 % (ref 5–12)
NEUTROPHILS # BLD AUTO: 3.09 10*3/MM3 (ref 1.7–7)
NEUTROPHILS NFR BLD AUTO: 55.9 % (ref 42.7–76)
NRBC BLD AUTO-RTO: 0 /100 WBC (ref 0–0.2)
PLATELET # BLD AUTO: 187 10*3/MM3 (ref 140–450)
POTASSIUM SERPL-SCNC: 4.7 MMOL/L (ref 3.5–5.2)
PROT SERPL-MCNC: 6.7 G/DL (ref 6–8.5)
RBC # BLD AUTO: 5.41 10*6/MM3 (ref 4.14–5.8)
SODIUM SERPL-SCNC: 136 MMOL/L (ref 136–145)
TRIGL SERPL-MCNC: 365 MG/DL (ref 0–150)
TSH SERPL DL<=0.005 MIU/L-ACNC: 1.92 UIU/ML (ref 0.27–4.2)
VIT B12 SERPL-MCNC: 538 PG/ML (ref 211–946)
VLDLC SERPL CALC-MCNC: 61 MG/DL (ref 5–40)
WBC # BLD AUTO: 5.53 10*3/MM3 (ref 3.4–10.8)

## 2022-05-07 NOTE — PROGRESS NOTES
"Chief Complaint  Snoring nonrestorative sleep    Subjective         Bradley Mukherjee presents to [facility] for the evaluation of snoring and nonrestorative sleep.  He is referred by REGGIE Hendrix.  He is seen in person in the sleep clinic.  History of Present Illness  Patient was last seen in clinic March 11.  He had a history of snoring and nonrestorative sleep.  Had a history of hypertension previous TIA.  He also has obesity and psychophysiologic insomnia.  He says he has been sleeping about the same.  He awakens with a headache 2 to 3 days/week.  He is still noted to have snoring.  He denies any real changes in his health status.  He thought his study night was somewhat better than usual.       Review of systems: All systems were reviewed and reported negative.      Hamden score is 13/24  Objective   Vital Signs:  /96 (BP Location: Left arm, Patient Position: Sitting, Cuff Size: Adult)   Pulse 83   Ht 175.3 cm (69\")   Wt 101 kg (221 lb 12.8 oz)   SpO2 96%   BMI 32.75 kg/m²           Physical Exam patient appears to be awake and alert.  He does not appear to be in acute respiratory distress.    He is normocephalic.  He is edentulous.    Lungs are clear.    Cardiac exam revealed normal S1-S2.    Extremities showed no edema.  Result Review :    Home sleep testing on March 24 showed an AHI of 6.4.  This is consistent with mild obstructive sleep apnea.  He spent only 0.2 minutes in the desaturated state.     Assessment and Plan   Diagnoses and all orders for this visit:    1. Obstructive sleep apnea, adult (Primary)  -     Detailed AutoPAP Order    2. Class 1 obesity due to excess calories without serious comorbidity with body mass index (BMI) of 32.0 to 32.9 in adult    Patient does have mild obstructive sleep apnea.  We have again discussed potential therapies including CPAP, weight control, oral appliance, and surgery.  We have discussed the consequences of " long-term untreated obstructive sleep apnea.  These include hypertension, diabetes, heart disease, stroke, and dementia.  After discussion he wishes to try CPAP.  We will place him on an AutoSet machine.  We will plan to see him back then after he has been on the machine for 2 months.  He is encouraged to lose weight.  He is encouraged avoid alcohol and sedatives close to bedtime.  He is encouraged to practice lateral position sleep.  He is to contact us earlier if other issues arise.       I spent 25 minutes caring for Bradley on this date of service. This time includes time spent by me in the following activities:reviewing tests, obtaining and/or reviewing a separately obtained history, performing a medically appropriate examination and/or evaluation , counseling and educating the patient/family/caregiver, ordering medications, tests, or procedures and documenting information in the medical record  Follow Up   Return in about 2 months (around 6/18/2022) for 31 to 90 days after PAP setup, Next scheduled follow-up.  Patient was given instructions and counseling regarding his condition or for health maintenance advice. Please see specific information pulled into the AVS if appropriate.   Jorge Segovia MD [facility]  Sleep Medicine  Pulmonary and Critical Care Medicine

## 2022-05-10 ENCOUNTER — TELEPHONE (OUTPATIENT)
Dept: INTERNAL MEDICINE | Facility: CLINIC | Age: 32
End: 2022-05-10

## 2022-05-10 DIAGNOSIS — R73.09 ABNORMAL GLUCOSE: Primary | ICD-10-CM

## 2022-05-10 NOTE — TELEPHONE ENCOUNTER
----- Message from REGGIE Brunner sent at 5/10/2022  2:17 PM EDT -----  Vitamin D is very low.  I am sending in Rx strength D3 that he will take once a week x 8 weeks then he will need to  OTC D3 and take 5000 units daily.      The triglycerides are way too high.  I am sending in a med called Fenofibrate to bring this down.  This is the fatty/sugary portion of the cholesterol panel, so he needs to cut back on his sugar and carb intake.      BG was very high at 208.   I need him to stop by for an A1C ASAP

## 2022-05-10 NOTE — TELEPHONE ENCOUNTER
Pt notified and verbalized understanding, he will stop in the morning to have the A1C done.     Can you go ahead and send in the rxs he will be taking.   Advised pt that Chandni is out on wednesdays but will send in rxs on Thursday    Pt verbalized understanding

## 2022-05-11 ENCOUNTER — CLINICAL SUPPORT (OUTPATIENT)
Dept: INTERNAL MEDICINE | Facility: CLINIC | Age: 32
End: 2022-05-11

## 2022-05-11 DIAGNOSIS — R73.09 ABNORMAL GLUCOSE: ICD-10-CM

## 2022-05-11 LAB
EXPIRATION DATE: ABNORMAL
HBA1C MFR BLD: 8.8 %
Lab: ABNORMAL

## 2022-05-11 PROCEDURE — 83036 HEMOGLOBIN GLYCOSYLATED A1C: CPT | Performed by: NURSE PRACTITIONER

## 2022-05-11 PROCEDURE — 3052F HG A1C>EQUAL 8.0%<EQUAL 9.0%: CPT | Performed by: NURSE PRACTITIONER

## 2022-05-12 ENCOUNTER — TELEPHONE (OUTPATIENT)
Dept: INTERNAL MEDICINE | Facility: CLINIC | Age: 32
End: 2022-05-12

## 2022-05-12 RX ORDER — CHOLECALCIFEROL (VITAMIN D3) 1250 MCG
50000 CAPSULE ORAL
Qty: 8 CAPSULE | Refills: 0 | Status: SHIPPED | OUTPATIENT
Start: 2022-05-12 | End: 2022-07-01

## 2022-05-12 RX ORDER — FENOFIBRATE 145 MG/1
145 TABLET, COATED ORAL DAILY
Qty: 90 TABLET | Refills: 1 | Status: SHIPPED | OUTPATIENT
Start: 2022-05-12

## 2022-05-12 NOTE — TELEPHONE ENCOUNTER
----- Message from REGGIE Brunner sent at 5/12/2022  9:13 AM EDT -----  Please ask pt to schedule an appt ASAP to go over labs

## 2022-05-17 ENCOUNTER — OFFICE VISIT (OUTPATIENT)
Dept: INTERNAL MEDICINE | Facility: CLINIC | Age: 32
End: 2022-05-17

## 2022-05-17 VITALS
HEART RATE: 93 BPM | SYSTOLIC BLOOD PRESSURE: 126 MMHG | HEIGHT: 69 IN | BODY MASS INDEX: 32.88 KG/M2 | WEIGHT: 222 LBS | RESPIRATION RATE: 20 BRPM | TEMPERATURE: 97.8 F | DIASTOLIC BLOOD PRESSURE: 88 MMHG | OXYGEN SATURATION: 96 %

## 2022-05-17 DIAGNOSIS — E55.9 VITAMIN D DEFICIENCY: ICD-10-CM

## 2022-05-17 DIAGNOSIS — E78.2 MIXED HYPERLIPIDEMIA: ICD-10-CM

## 2022-05-17 DIAGNOSIS — E11.9 CONTROLLED TYPE 2 DIABETES MELLITUS WITHOUT COMPLICATION, WITHOUT LONG-TERM CURRENT USE OF INSULIN: Primary | ICD-10-CM

## 2022-05-17 PROCEDURE — 99214 OFFICE O/P EST MOD 30 MIN: CPT | Performed by: NURSE PRACTITIONER

## 2022-05-17 RX ORDER — METFORMIN HYDROCHLORIDE 500 MG/1
500 TABLET, EXTENDED RELEASE ORAL
Qty: 90 TABLET | Refills: 1 | Status: SHIPPED | OUTPATIENT
Start: 2022-05-17 | End: 2022-12-22 | Stop reason: SDUPTHER

## 2022-05-17 NOTE — PROGRESS NOTES
Subjective   Bradley Mukherjee is a 31 y.o. male    Chief Complaint   Patient presents with   • Diabetes     Discuss newly dx'd as diabetic, A1C 8.8      History of Present Illness     BG was found to be elevated at 208 on last labs.  A1c was checked and was 8.8%.  He has no known personal history of diabetes.  He is adopted and does not know his biological family history.    HL -fenofibrate was recently added to his medication.  He is also taking Lipitor 80 mg daily.  Lab Results   Component Value Date    CHOL 175 07/03/2020    CHLPL 179 04/22/2022    TRIG 365 (H) 04/22/2022    HDL 26 (L) 04/22/2022    LDL 92 04/22/2022     Vit D def - Rx strength D3 was sent in.  He has not started this just yet    The following portions of the patient's history were reviewed and updated as appropriate: allergies, current medications, past family history, past medical history, past social history, past surgical history and problem list.    Current Outpatient Medications:   •  atorvastatin (LIPITOR) 80 MG tablet, Take 1 tablet by mouth every night at bedtime., Disp: 90 tablet, Rfl: 1  •  Cholecalciferol (Vitamin D3) 1.25 MG (90886 UT) capsule, Take 1 capsule by mouth Every 7 (Seven) Days for 8 doses., Disp: 8 capsule, Rfl: 0  •  fenofibrate (Tricor) 145 MG tablet, Take 1 tablet by mouth Daily., Disp: 90 tablet, Rfl: 1  •  FLUoxetine (PROzac) 20 MG capsule, Take 1 capsule by mouth Daily., Disp: 90 capsule, Rfl: 1  •  metoprolol succinate XL (TOPROL-XL) 25 MG 24 hr tablet, Take 1 tablet by mouth Daily., Disp: 90 tablet, Rfl: 1  •  metFORMIN ER (GLUCOPHAGE-XR) 500 MG 24 hr tablet, Take 1 tablet by mouth Daily With Breakfast., Disp: 90 tablet, Rfl: 1     Review of Systems   Constitutional: Negative for chills, fatigue and fever.   Respiratory: Negative for cough, chest tightness and shortness of breath.    Cardiovascular: Negative for chest pain.   Gastrointestinal: Negative for abdominal pain, diarrhea, nausea and vomiting.  "  Endocrine: Negative for cold intolerance and heat intolerance.   Musculoskeletal: Negative for arthralgias.   Neurological: Negative for dizziness.       Objective   Physical Exam  Constitutional:       Appearance: He is well-developed.   HENT:      Head: Normocephalic and atraumatic.   Eyes:      Conjunctiva/sclera: Conjunctivae normal.      Pupils: Pupils are equal, round, and reactive to light.   Cardiovascular:      Rate and Rhythm: Normal rate and regular rhythm.      Heart sounds: Normal heart sounds.   Pulmonary:      Effort: Pulmonary effort is normal.      Breath sounds: Normal breath sounds.   Abdominal:      General: Bowel sounds are normal.      Palpations: Abdomen is soft.   Musculoskeletal:         General: Normal range of motion.      Cervical back: Normal range of motion.   Skin:     General: Skin is warm and dry.   Neurological:      Mental Status: He is alert and oriented to person, place, and time.      Deep Tendon Reflexes: Reflexes are normal and symmetric.   Psychiatric:         Behavior: Behavior normal.         Thought Content: Thought content normal.         Judgment: Judgment normal.       Vitals:    05/17/22 1007   BP: 126/88   Cuff Size: Adult   Pulse: 93   Resp: 20   Temp: 97.8 °F (36.6 °C)   TempSrc: Infrared   SpO2: 96%   Weight: 101 kg (222 lb)   Height: 174.8 cm (68.8\")         Assessment & Plan   Diagnoses and all orders for this visit:    1. Controlled type 2 diabetes mellitus without complication, without long-term current use of insulin (HCC) (Primary)  -     metFORMIN ER (GLUCOPHAGE-XR) 500 MG 24 hr tablet; Take 1 tablet by mouth Daily With Breakfast.  Dispense: 90 tablet; Refill: 1  -     Comprehensive Metabolic Panel; Future    2. Mixed hyperlipidemia    3. Vitamin D deficiency      Counseled on new diagnosis of diabetes type 2  He will cut out regular soda and increase his water intake  We will add metformin  mg p.o. daily  He will start fenofibrate vitamin D and take " as directed.  He will stop in the office for labs in 1 month  Return in about 3 months (around 8/17/2022) for f/u.

## 2022-10-17 ENCOUNTER — TELEMEDICINE (OUTPATIENT)
Dept: INTERNAL MEDICINE | Facility: CLINIC | Age: 32
End: 2022-10-17

## 2022-10-17 DIAGNOSIS — J30.9 ALLERGIC RHINITIS, UNSPECIFIED SEASONALITY, UNSPECIFIED TRIGGER: ICD-10-CM

## 2022-10-17 DIAGNOSIS — J01.90 ACUTE NON-RECURRENT SINUSITIS, UNSPECIFIED LOCATION: Primary | ICD-10-CM

## 2022-10-17 PROCEDURE — 99213 OFFICE O/P EST LOW 20 MIN: CPT | Performed by: PHYSICIAN ASSISTANT

## 2022-10-17 RX ORDER — CETIRIZINE HYDROCHLORIDE 10 MG/1
10 TABLET ORAL DAILY
Qty: 90 TABLET | Refills: 1 | Status: SHIPPED | OUTPATIENT
Start: 2022-10-17

## 2022-10-17 RX ORDER — BENZONATATE 200 MG/1
200 CAPSULE ORAL 3 TIMES DAILY PRN
Qty: 30 CAPSULE | Refills: 0 | Status: SHIPPED | OUTPATIENT
Start: 2022-10-17 | End: 2022-10-27

## 2022-10-17 RX ORDER — AZITHROMYCIN 250 MG/1
TABLET, FILM COATED ORAL
Qty: 6 TABLET | Refills: 0 | Status: SHIPPED | OUTPATIENT
Start: 2022-10-17 | End: 2022-12-22

## 2022-10-17 RX ORDER — GUAIFENESIN 600 MG/1
1200 TABLET, EXTENDED RELEASE ORAL 2 TIMES DAILY
Qty: 24 TABLET | Refills: 0 | Status: SHIPPED | OUTPATIENT
Start: 2022-10-17 | End: 2022-12-22

## 2022-10-17 NOTE — PROGRESS NOTES
Mode of Visit: Video  Location of patient: Home address  Location of provider: [facility] at [address] [facility], [place] [address]  You have chosen to receive care through a telehealth visit.  Do you consent to use a audio/video connection for your medical care today? Yes  This was an audio and video enabled telemedicine encounter.  No technical issues occurred during this visit.    Chief Complaint  Nasal Congestion    Subjective          History of Present Illness  Bradley Mukherjee presents to [facility] for   History of Present Illness  URI:  Woke up with hoarseness and cough. Has not had fever or body aches. Gets sinus infections often. His boss has been sick with similar sx.  He has hx of allergies, takes zyrtec prn for allergies. No wheeze or SOA. No hx of having to use albuterol inhaler before. No n/v/d. Feels like he has a sinus infection      Review of Systems   Constitutional: Negative for fever and unexpected weight loss.   HENT: Positive for congestion, rhinorrhea and sinus pressure. Negative for ear pain and sore throat.    Respiratory: Positive for cough. Negative for shortness of breath and wheezing.    Cardiovascular: Negative for chest pain and palpitations.   Gastrointestinal: Negative for abdominal pain, diarrhea, nausea and vomiting.   Musculoskeletal: Negative for myalgias.   Skin: Negative for rash.   Neurological: Positive for headache.       The following portions of the patient's history were reviewed and updated as appropriate: allergies, current medications, past family history, past medical history, past social history, past surgical history and problem list.    Allergies   Allergen Reactions   • Other Other (See Comments)     Narcotics.  Pt states no narcotics.       Current Outpatient Medications:   •  atorvastatin (LIPITOR) 80 MG tablet, Take 1 tablet by mouth every night at bedtime., Disp: 90 tablet, Rfl: 1  •  azithromycin  (Zithromax Z-Idris) 250 MG tablet, Take 2 tablets by mouth on day 1, then 1 tablet daily on days 2-5, Disp: 6 tablet, Rfl: 0  •  benzonatate (TESSALON) 200 MG capsule, Take 1 capsule by mouth 3 (Three) Times a Day As Needed for Cough for up to 10 days., Disp: 30 capsule, Rfl: 0  •  cetirizine (zyrTEC) 10 MG tablet, Take 1 tablet by mouth Daily., Disp: 90 tablet, Rfl: 1  •  fenofibrate (Tricor) 145 MG tablet, Take 1 tablet by mouth Daily., Disp: 90 tablet, Rfl: 1  •  FLUoxetine (PROzac) 20 MG capsule, Take 1 capsule by mouth Daily., Disp: 90 capsule, Rfl: 1  •  guaiFENesin (Mucinex) 600 MG 12 hr tablet, Take 2 tablets by mouth 2 (Two) Times a Day., Disp: 24 tablet, Rfl: 0  •  metFORMIN ER (GLUCOPHAGE-XR) 500 MG 24 hr tablet, Take 1 tablet by mouth Daily With Breakfast., Disp: 90 tablet, Rfl: 1  •  metoprolol succinate XL (TOPROL-XL) 25 MG 24 hr tablet, Take 1 tablet by mouth Daily., Disp: 90 tablet, Rfl: 1  New Medications Ordered This Visit   Medications   • azithromycin (Zithromax Z-Idris) 250 MG tablet     Sig: Take 2 tablets by mouth on day 1, then 1 tablet daily on days 2-5     Dispense:  6 tablet     Refill:  0   • guaiFENesin (Mucinex) 600 MG 12 hr tablet     Sig: Take 2 tablets by mouth 2 (Two) Times a Day.     Dispense:  24 tablet     Refill:  0   • benzonatate (TESSALON) 200 MG capsule     Sig: Take 1 capsule by mouth 3 (Three) Times a Day As Needed for Cough for up to 10 days.     Dispense:  30 capsule     Refill:  0   • cetirizine (zyrTEC) 10 MG tablet     Sig: Take 1 tablet by mouth Daily.     Dispense:  90 tablet     Refill:  1     Social History     Tobacco Use   Smoking Status Every Day   • Packs/day: 0.50   • Years: 15.00   • Pack years: 7.50   • Types: Cigarettes   Smokeless Tobacco Never   Tobacco Comments    Tried patches. But they did not help.        Objective   There were no vitals filed for this visit.  There is no height or weight on file to calculate BMI.    Physical Exam   Constitutional: He  appears well-developed and well-nourished. No distress.   HENT:   Head: Normocephalic and atraumatic.   Eyes: Conjunctivae and EOM are normal.   Neck: Neck normal appearance.  Pulmonary/Chest: Effort normal.  No respiratory distress. He no audible wheeze...  Neurological: He is alert.   Psychiatric: He has a normal mood and affect.   Vitals reviewed.      Result Review :        Assessment and Plan    Diagnoses and all orders for this visit:    1. Acute non-recurrent sinusitis, unspecified location (Primary)  Assessment & Plan:  Treat with zpack, f/u if sx worsen or persist. Can come in tomorrow morning for covid test. Supportive care.    Orders:  -     azithromycin (Zithromax Z-Idris) 250 MG tablet; Take 2 tablets by mouth on day 1, then 1 tablet daily on days 2-5  Dispense: 6 tablet; Refill: 0  -     guaiFENesin (Mucinex) 600 MG 12 hr tablet; Take 2 tablets by mouth 2 (Two) Times a Day.  Dispense: 24 tablet; Refill: 0  -     benzonatate (TESSALON) 200 MG capsule; Take 1 capsule by mouth 3 (Three) Times a Day As Needed for Cough for up to 10 days.  Dispense: 30 capsule; Refill: 0  -     cetirizine (zyrTEC) 10 MG tablet; Take 1 tablet by mouth Daily.  Dispense: 90 tablet; Refill: 1  -     POCT VERITOR SARS-CoV-2 Antigen; Future    2. Allergic rhinitis, unspecified seasonality, unspecified trigger  -     cetirizine (zyrTEC) 10 MG tablet; Take 1 tablet by mouth Daily.  Dispense: 90 tablet; Refill: 1          Follow Up   Return if symptoms worsen or fail to improve.    Follow up if symptoms worsen or persist or has new or concerning symptoms, go to ER for severe symptoms.   I discussed my findings, recommendations, and plan of care with the patient. Reviewed common medication effects and side effects and to report side effects immediately. Encouraged medication compliance and the importance of keeping scheduled follow up appointments. Pt verbalized understanding and agreement.  If a referral was made please contact our  office if you have not heard about an appointment in the next 2 weeks.   If labs or images are ordered we will contact you with the results within the next week.  If you have not heard from us after a week please call our office to inquire about the results.     I have reviewed the limitations of a telehealth visit (such as lack of a physical exam and unable to obtain vital signs) and advised the patient that they may need to follow up for an office visit should their symptoms or concerns persist, worsen, or change.    Edith Carranza PA-C    * Please note that portions of this note were completed with a voice recognition program.

## 2022-10-17 NOTE — ASSESSMENT & PLAN NOTE
Treat with zpack, f/u if sx worsen or persist. Can come in tomorrow morning for covid test. Supportive care.

## 2022-12-22 ENCOUNTER — OFFICE VISIT (OUTPATIENT)
Dept: INTERNAL MEDICINE | Facility: CLINIC | Age: 32
End: 2022-12-22
Payer: MEDICAID

## 2022-12-22 VITALS
HEART RATE: 95 BPM | SYSTOLIC BLOOD PRESSURE: 138 MMHG | BODY MASS INDEX: 31.61 KG/M2 | OXYGEN SATURATION: 94 % | TEMPERATURE: 97.1 F | WEIGHT: 213.4 LBS | RESPIRATION RATE: 18 BRPM | HEIGHT: 69 IN | DIASTOLIC BLOOD PRESSURE: 86 MMHG

## 2022-12-22 DIAGNOSIS — F32.A ANXIETY AND DEPRESSION: Chronic | ICD-10-CM

## 2022-12-22 DIAGNOSIS — F41.9 ANXIETY AND DEPRESSION: Chronic | ICD-10-CM

## 2022-12-22 DIAGNOSIS — E78.2 MIXED HYPERLIPIDEMIA: ICD-10-CM

## 2022-12-22 DIAGNOSIS — N48.1 BALANITIS: Primary | ICD-10-CM

## 2022-12-22 DIAGNOSIS — I10 ESSENTIAL HYPERTENSION: ICD-10-CM

## 2022-12-22 DIAGNOSIS — E11.65 UNCONTROLLED TYPE 2 DIABETES MELLITUS WITH HYPERGLYCEMIA: ICD-10-CM

## 2022-12-22 LAB
EXPIRATION DATE: NORMAL
HBA1C MFR BLD: 11.9 %
Lab: NORMAL

## 2022-12-22 PROCEDURE — 99214 OFFICE O/P EST MOD 30 MIN: CPT | Performed by: PHYSICIAN ASSISTANT

## 2022-12-22 PROCEDURE — 83036 HEMOGLOBIN GLYCOSYLATED A1C: CPT | Performed by: PHYSICIAN ASSISTANT

## 2022-12-22 PROCEDURE — 3079F DIAST BP 80-89 MM HG: CPT | Performed by: PHYSICIAN ASSISTANT

## 2022-12-22 PROCEDURE — 3075F SYST BP GE 130 - 139MM HG: CPT | Performed by: PHYSICIAN ASSISTANT

## 2022-12-22 PROCEDURE — 3046F HEMOGLOBIN A1C LEVEL >9.0%: CPT | Performed by: PHYSICIAN ASSISTANT

## 2022-12-22 RX ORDER — DULAGLUTIDE 0.75 MG/.5ML
0.75 INJECTION, SOLUTION SUBCUTANEOUS WEEKLY
Qty: 2 ML | Refills: 1 | Status: SHIPPED | OUTPATIENT
Start: 2022-12-22 | End: 2022-12-27

## 2022-12-22 RX ORDER — BLOOD-GLUCOSE METER
KIT MISCELLANEOUS
Qty: 1 EACH | Refills: 0 | Status: SHIPPED | OUTPATIENT
Start: 2022-12-22

## 2022-12-22 RX ORDER — CLOTRIMAZOLE 1 %
1 CREAM (GRAM) TOPICAL 2 TIMES DAILY
Qty: 28 G | Refills: 2 | Status: SHIPPED | OUTPATIENT
Start: 2022-12-22 | End: 2023-01-19

## 2022-12-22 RX ORDER — METFORMIN HYDROCHLORIDE 500 MG/1
500 TABLET, EXTENDED RELEASE ORAL
Qty: 90 TABLET | Refills: 1 | Status: SHIPPED | OUTPATIENT
Start: 2022-12-22 | End: 2022-12-22 | Stop reason: SDUPTHER

## 2022-12-22 RX ORDER — SYRING-NEEDL,DISP,INSUL,0.3 ML 30 GX5/16"
SYRINGE, EMPTY DISPOSABLE MISCELLANEOUS
Qty: 100 EACH | Refills: 1 | Status: SHIPPED | OUTPATIENT
Start: 2022-12-22

## 2022-12-22 RX ORDER — METFORMIN HYDROCHLORIDE 500 MG/1
500 TABLET, EXTENDED RELEASE ORAL
Qty: 120 TABLET | Refills: 1 | Status: SHIPPED | OUTPATIENT
Start: 2022-12-22 | End: 2023-02-21 | Stop reason: SDUPTHER

## 2022-12-22 RX ORDER — ATORVASTATIN CALCIUM 80 MG/1
80 TABLET, FILM COATED ORAL
Qty: 90 TABLET | Refills: 1 | Status: SHIPPED | OUTPATIENT
Start: 2022-12-22

## 2022-12-22 RX ORDER — METOPROLOL SUCCINATE 25 MG/1
25 TABLET, EXTENDED RELEASE ORAL DAILY
Qty: 90 TABLET | Refills: 1 | Status: SHIPPED | OUTPATIENT
Start: 2022-12-22

## 2022-12-22 RX ORDER — FLUOXETINE HYDROCHLORIDE 20 MG/1
20 CAPSULE ORAL DAILY
Qty: 90 CAPSULE | Refills: 1 | Status: SHIPPED | OUTPATIENT
Start: 2022-12-22

## 2022-12-22 RX ORDER — GLUCOSAMINE HCL/CHONDROITIN SU 500-400 MG
CAPSULE ORAL
Qty: 100 EACH | Refills: 1 | Status: SHIPPED | OUTPATIENT
Start: 2022-12-22

## 2022-12-22 NOTE — PROGRESS NOTES
Chief Complaint  Follow-up (Follow-up for Penial Yeast infection)    Subjective          History of Present Illness  Bradley Mukherjee presents to [facility] for   History of Present Illness  Rash/yeast inf:  Has white film and red/irritated area on his penis for the last 2 weeks. Has not had any fever, no concern for STD. Hx of UTI in the past but this does not seem like that. Has been using monistat and it helps a little but it does not completely resolve. He has had this before and it resolved with monistat. Defers exam at this time, requesting treatment.    Depression:  Doing ok with prozac 20mg, he can tell a difference if he misses several doses in a row. He has problems with his anger w/o the medicine. No HI/SI. .    HLD:  Lab Results       Component                Value               Date                       CHOL                     175                 07/03/2020                 CHLPL                    179                 04/22/2022                 TRIG                     365 (H)             04/22/2022                 HDL                      26 (L)              04/22/2022                 LDL                      92                  04/22/2022        Doing well on fenofibrate, no side effects.    Diabetes:  Lab Results       Component                Value               Date                       HGBA1C                   8.8                 05/11/2022       Taking metformin as directed one once a day. Takes at night b/c he got nauseated if he took it in the morning.     HTN:  Doing well on metoprolol, no CP/SOA, no side effects.           The following portions of the patient's history were reviewed and updated as appropriate: allergies, current medications, past family history, past medical history, past social history, past surgical history and problem list.  Allergies   Allergen Reactions   • Other Other (See Comments)     Narcotics.  Pt states no narcotics.        Current Outpatient Medications:   •  atorvastatin (LIPITOR) 80 MG tablet, Take 1 tablet by mouth every night at bedtime., Disp: 90 tablet, Rfl: 1  •  cetirizine (zyrTEC) 10 MG tablet, Take 1 tablet by mouth Daily., Disp: 90 tablet, Rfl: 1  •  fenofibrate (Tricor) 145 MG tablet, Take 1 tablet by mouth Daily., Disp: 90 tablet, Rfl: 1  •  FLUoxetine (PROzac) 20 MG capsule, Take 1 capsule by mouth Daily., Disp: 90 capsule, Rfl: 1  •  metFORMIN ER (GLUCOPHAGE-XR) 500 MG 24 hr tablet, Take 1 tablet by mouth Daily With Breakfast., Disp: 120 tablet, Rfl: 1  •  metoprolol succinate XL (TOPROL-XL) 25 MG 24 hr tablet, Take 1 tablet by mouth Daily., Disp: 90 tablet, Rfl: 1  •  clotrimazole (LOTRIMIN) 1 % cream, Apply 1 application topically to the appropriate area as directed 2 (Two) Times a Day., Disp: 28 g, Rfl: 2  •  Glucose Blood (Blood Glucose Test) strip, Use with glucometer daily as directed, E11.9, Disp: 100 each, Rfl: 1  •  glucose monitor monitoring kit, Use as directed, E11.9, Disp: 1 each, Rfl: 0  •  Lancet Device misc, Use with glucometer daily as directed, E11.9, Disp: 100 each, Rfl: 1  •  mupirocin (BACTROBAN) 2 % ointment, Apply 1 application topically to the appropriate area as directed 3 (Three) Times a Day., Disp: 22 g, Rfl: 0  •  Semaglutide,0.25 or 0.5MG/DOS, (Ozempic, 0.25 or 0.5 MG/DOSE,) 2 MG/1.5ML solution pen-injector, Inject 0.25 mg under the skin into the appropriate area as directed 1 (One) Time Per Week., Disp: 1.5 mL, Rfl: 1  •  sulfamethoxazole-trimethoprim (Bactrim DS) 800-160 MG per tablet, Take 1 tablet by mouth 2 (Two) Times a Day for 10 days., Disp: 20 tablet, Rfl: 0  New Medications Ordered This Visit   Medications   • clotrimazole (LOTRIMIN) 1 % cream     Sig: Apply 1 application topically to the appropriate area as directed 2 (Two) Times a Day.     Dispense:  28 g     Refill:  2   • FLUoxetine (PROzac) 20 MG capsule     Sig: Take 1 capsule by mouth Daily.     Dispense:  90 capsule      Refill:  1   • metoprolol succinate XL (TOPROL-XL) 25 MG 24 hr tablet     Sig: Take 1 tablet by mouth Daily.     Dispense:  90 tablet     Refill:  1   • atorvastatin (LIPITOR) 80 MG tablet     Sig: Take 1 tablet by mouth every night at bedtime.     Dispense:  90 tablet     Refill:  1   • glucose monitor monitoring kit     Sig: Use as directed, E11.9     Dispense:  1 each     Refill:  0   • Glucose Blood (Blood Glucose Test) strip     Sig: Use with glucometer daily as directed, E11.9     Dispense:  100 each     Refill:  1   • Lancet Device misc     Sig: Use with glucometer daily as directed, E11.9     Dispense:  100 each     Refill:  1   • metFORMIN ER (GLUCOPHAGE-XR) 500 MG 24 hr tablet     Sig: Take 1 tablet by mouth Daily With Breakfast.     Dispense:  120 tablet     Refill:  1     Social History     Tobacco Use   Smoking Status Every Day   • Packs/day: 0.50   • Years: 15.00   • Pack years: 7.50   • Types: Cigarettes   Smokeless Tobacco Never   Tobacco Comments    Tried patches. But they did not help.        Objective   Vital Signs:   Vitals:    12/22/22 1041   BP: 138/86   BP Location: Right arm   Patient Position: Sitting   Pulse: 95   Resp: 18   Temp: 97.1 °F (36.2 °C)   TempSrc: Temporal   SpO2: 94%   Weight: 96.8 kg (213 lb 6.4 oz)   Height: 174.8 cm (68.82\")   PainSc:   1      Physical Exam  Vitals reviewed.   Constitutional:       General: He is not in acute distress.     Appearance: Normal appearance.   HENT:      Head: Normocephalic and atraumatic.   Eyes:      General: No scleral icterus.     Extraocular Movements: Extraocular movements intact.      Conjunctiva/sclera: Conjunctivae normal.   Cardiovascular:      Rate and Rhythm: Normal rate and regular rhythm.      Heart sounds: Normal heart sounds. No murmur heard.  Pulmonary:      Effort: Pulmonary effort is normal. No respiratory distress.      Breath sounds: Normal breath sounds. No stridor. No wheezing or rhonchi.   Musculoskeletal:       Cervical back: Normal range of motion and neck supple.   Skin:     General: Skin is warm and dry.      Coloration: Skin is not jaundiced.   Neurological:      General: No focal deficit present.      Mental Status: He is alert and oriented to person, place, and time.      Gait: Gait normal.   Psychiatric:         Mood and Affect: Mood normal.         Behavior: Behavior normal.        No LMP for male patient.    Result Review :              Results for orders placed or performed in visit on 12/22/22   POC Glycosylated Hemoglobin (Hb A1C)    Specimen: Blood   Result Value Ref Range    Hemoglobin A1C 11.9 %    Lot Number [id number]     Expiration Date 09/14/2024           Assessment and Plan    Diagnoses and all orders for this visit:    1. Balanitis (Primary)  Assessment & Plan:  Treat with clotrimazole, f/u if sx worsen or persist    Orders:  -     clotrimazole (LOTRIMIN) 1 % cream; Apply 1 application topically to the appropriate area as directed 2 (Two) Times a Day.  Dispense: 28 g; Refill: 2    2. Uncontrolled type 2 diabetes mellitus with hyperglycemia (HCC)  Assessment & Plan:  Diabetes is worsening. A1c increased to 11.9, continue metformin, start Trulicity. Check fasting sugar daily and f/u in 4 weeks. Work on diet changes, decrease carbs    Orders:   Dulaglutide (Trulicity) 0.75 MG/0.5ML solution pen-injector; Inject 0.75 mg under the skin into the appropriate area as directed 1 (One) Time Per Week.  Dispense: 2 mL; Refill: 1  -     POC Glycosylated Hemoglobin (Hb A1C)  -     glucose monitor monitoring kit; Use as directed, E11.9  Dispense: 1 each; Refill: 0  -     Glucose Blood (Blood Glucose Test) strip; Use with glucometer daily as directed, E11.9  Dispense: 100 each; Refill: 1  -     Lancet Device misc; Use with glucometer daily as directed, E11.9  Dispense: 100 each; Refill: 1  -     metFORMIN ER (GLUCOPHAGE-XR) 500 MG 24 hr tablet; Take 1 tablet by mouth Daily With Breakfast.  Dispense: 120 tablet;  Refill: 1    3. Anxiety and depression  Assessment & Plan:  Chronic, stable, continue current meds, follow up as directed.  Continue Prozac    Orders:  -     FLUoxetine (PROzac) 20 MG capsule; Take 1 capsule by mouth Daily.  Dispense: 90 capsule; Refill: 1    4. Essential hypertension  Assessment & Plan:  Chronic, stable, continue current meds, follow up as directed.,  Continue metoprolol 25    Orders:  -     metoprolol succinate XL (TOPROL-XL) 25 MG 24 hr tablet; Take 1 tablet by mouth Daily.  Dispense: 90 tablet; Refill: 1    5. Mixed hyperlipidemia  Assessment & Plan:  Chronic, stable, continue current meds, follow up as directed.  Continue fenofibrate 145 and Lipitor 80    Orders:  -     atorvastatin (LIPITOR) 80 MG tablet; Take 1 tablet by mouth every night at bedtime.  Dispense: 90 tablet; Refill: 1      Follow Up   Return in about 4 weeks (around 1/19/2023) for DMII.    Follow up if symptoms worsen or persist or has new or concerning symptoms, go to ER for severe symptoms.   Reviewed common medication effects and side effects and advised to report side effects immediately.  Encouraged medication compliance and the importance of keeping scheduled follow up appointments with me and any other providers.  If a referral was made please contact our office if you have not heard about an appointment in the next 2 weeks.   If labs or images are ordered we will contact you with the results within the next week.  If you have not heard from us after a week please call our office to inquire about the results.   Patient was given instructions and counseling regarding his condition or for health maintenance advice. Please see specific information pulled into the AVS if appropriate.     Edith Carranza PA-C    * Please note that portions of this note were completed with a voice recognition program.

## 2022-12-27 ENCOUNTER — PRIOR AUTHORIZATION (OUTPATIENT)
Dept: INTERNAL MEDICINE | Facility: CLINIC | Age: 32
End: 2022-12-27

## 2022-12-27 RX ORDER — SEMAGLUTIDE 1.34 MG/ML
0.25 INJECTION, SOLUTION SUBCUTANEOUS WEEKLY
Qty: 1.5 ML | Refills: 1 | Status: SHIPPED | OUTPATIENT
Start: 2022-12-27 | End: 2023-01-19 | Stop reason: SDUPTHER

## 2022-12-28 ENCOUNTER — OFFICE VISIT (OUTPATIENT)
Dept: INTERNAL MEDICINE | Facility: CLINIC | Age: 32
End: 2022-12-28

## 2022-12-28 VITALS
WEIGHT: 223 LBS | BODY MASS INDEX: 33.03 KG/M2 | OXYGEN SATURATION: 98 % | HEIGHT: 69 IN | SYSTOLIC BLOOD PRESSURE: 120 MMHG | DIASTOLIC BLOOD PRESSURE: 78 MMHG | TEMPERATURE: 97 F | RESPIRATION RATE: 20 BRPM | HEART RATE: 95 BPM

## 2022-12-28 DIAGNOSIS — L02.31 CELLULITIS AND ABSCESS OF BUTTOCK: Primary | ICD-10-CM

## 2022-12-28 DIAGNOSIS — L03.317 CELLULITIS AND ABSCESS OF BUTTOCK: Primary | ICD-10-CM

## 2022-12-28 DIAGNOSIS — E11.649 UNCONTROLLED TYPE 2 DIABETES MELLITUS WITH HYPOGLYCEMIA WITHOUT COMA: ICD-10-CM

## 2022-12-28 LAB
EXPIRATION DATE: ABNORMAL
GLUCOSE BLDC GLUCOMTR-MCNC: 252 MG/DL (ref 70–130)
Lab: ABNORMAL

## 2022-12-28 PROCEDURE — 99214 OFFICE O/P EST MOD 30 MIN: CPT | Performed by: NURSE PRACTITIONER

## 2022-12-28 PROCEDURE — 96372 THER/PROPH/DIAG INJ SC/IM: CPT | Performed by: NURSE PRACTITIONER

## 2022-12-28 PROCEDURE — 82962 GLUCOSE BLOOD TEST: CPT | Performed by: NURSE PRACTITIONER

## 2022-12-28 RX ORDER — CEFTRIAXONE 1 G/1
1 INJECTION, POWDER, FOR SOLUTION INTRAMUSCULAR; INTRAVENOUS ONCE
Status: COMPLETED | OUTPATIENT
Start: 2022-12-28 | End: 2022-12-28

## 2022-12-28 RX ORDER — SULFAMETHOXAZOLE AND TRIMETHOPRIM 800; 160 MG/1; MG/1
1 TABLET ORAL 2 TIMES DAILY
Qty: 20 TABLET | Refills: 0 | Status: SHIPPED | OUTPATIENT
Start: 2022-12-28 | End: 2023-01-07

## 2022-12-28 RX ADMIN — CEFTRIAXONE 1 G: 1 INJECTION, POWDER, FOR SOLUTION INTRAMUSCULAR; INTRAVENOUS at 14:17

## 2022-12-28 NOTE — PROGRESS NOTES
Subjective   Bradley Mukherjee is a 32 y.o. male    Chief Complaint   Patient presents with   • Abscess     X 2 days, center of butt crack, very painful, red, raised, large     History of Present Illness     Patient presents with complaints of a large boil/abscess on the left inner buttock.  States that area is very painful.  Very firm.  He denies fever chills.  States that he first noticed this approximately 2 days ago.  He has been soaking in warm Epson salt baths with mild relief of pain.    Deny is a type II diabetic.  His blood sugars are uncontrolled.  He was recently seen and his A1c was greater than 11.  He had been taking metformin as directed, but had not been seen since May.  Trulicity was added to his regimen but he has not yet started this medication.    Today's fingerstick in office is 252    The following portions of the patient's history were reviewed and updated as appropriate: allergies, current medications, past family history, past medical history, past social history, past surgical history and problem list.    Current Outpatient Medications:   •  atorvastatin (LIPITOR) 80 MG tablet, Take 1 tablet by mouth every night at bedtime., Disp: 90 tablet, Rfl: 1  •  cetirizine (zyrTEC) 10 MG tablet, Take 1 tablet by mouth Daily., Disp: 90 tablet, Rfl: 1  •  clotrimazole (LOTRIMIN) 1 % cream, Apply 1 application topically to the appropriate area as directed 2 (Two) Times a Day., Disp: 28 g, Rfl: 2  •  fenofibrate (Tricor) 145 MG tablet, Take 1 tablet by mouth Daily., Disp: 90 tablet, Rfl: 1  •  FLUoxetine (PROzac) 20 MG capsule, Take 1 capsule by mouth Daily., Disp: 90 capsule, Rfl: 1  •  Glucose Blood (Blood Glucose Test) strip, Use with glucometer daily as directed, E11.9, Disp: 100 each, Rfl: 1  •  glucose monitor monitoring kit, Use as directed, E11.9, Disp: 1 each, Rfl: 0  •  Lancet Device misc, Use with glucometer daily as directed, E11.9, Disp: 100 each, Rfl: 1  •  metFORMIN ER (GLUCOPHAGE-XR)  500 MG 24 hr tablet, Take 1 tablet by mouth Daily With Breakfast., Disp: 120 tablet, Rfl: 1  •  metoprolol succinate XL (TOPROL-XL) 25 MG 24 hr tablet, Take 1 tablet by mouth Daily., Disp: 90 tablet, Rfl: 1  •  Semaglutide,0.25 or 0.5MG/DOS, (Ozempic, 0.25 or 0.5 MG/DOSE,) 2 MG/1.5ML solution pen-injector, Inject 0.25 mg under the skin into the appropriate area as directed 1 (One) Time Per Week., Disp: 1.5 mL, Rfl: 1  •  sulfamethoxazole-trimethoprim (Bactrim DS) 800-160 MG per tablet, Take 1 tablet by mouth 2 (Two) Times a Day for 10 days., Disp: 20 tablet, Rfl: 0  No current facility-administered medications for this visit.     Review of Systems   Constitutional: Negative for chills, fatigue and fever.   Respiratory: Negative for cough, chest tightness and shortness of breath.    Cardiovascular: Negative for chest pain.   Gastrointestinal: Negative for abdominal pain, diarrhea, nausea and vomiting.   Endocrine: Negative for cold intolerance and heat intolerance.   Musculoskeletal: Negative for arthralgias.   Skin:        Boil on left buttock   Neurological: Negative for dizziness.       Objective   Physical Exam  Constitutional:       Appearance: He is well-developed.   HENT:      Head: Normocephalic and atraumatic.   Eyes:      Conjunctiva/sclera: Conjunctivae normal.      Pupils: Pupils are equal, round, and reactive to light.   Cardiovascular:      Rate and Rhythm: Normal rate and regular rhythm.      Heart sounds: Normal heart sounds.   Pulmonary:      Effort: Pulmonary effort is normal.      Breath sounds: Normal breath sounds.   Abdominal:      General: Bowel sounds are normal.      Palpations: Abdomen is soft.   Musculoskeletal:         General: Normal range of motion.      Cervical back: Normal range of motion.   Skin:     General: Skin is warm and dry.          Neurological:      Mental Status: He is alert and oriented to person, place, and time.      Deep Tendon Reflexes: Reflexes are normal and  "symmetric.   Psychiatric:         Behavior: Behavior normal.         Thought Content: Thought content normal.         Judgment: Judgment normal.       Vitals:    12/28/22 1342   BP: 120/78   Cuff Size: Adult   Pulse: 95   Resp: 20   Temp: 97 °F (36.1 °C)   TempSrc: Infrared   SpO2: 98%   Weight: 101 kg (223 lb)   Height: 174.8 cm (68.82\")         Assessment & Plan   Diagnoses and all orders for this visit:    1. Cellulitis and abscess of buttock (Primary)  -     cefTRIAXone (ROCEPHIN) injection 1 g  -     sulfamethoxazole-trimethoprim (Bactrim DS) 800-160 MG per tablet; Take 1 tablet by mouth 2 (Two) Times a Day for 10 days.  Dispense: 20 tablet; Refill: 0    2. Uncontrolled type 2 diabetes mellitus with hypoglycemia without coma (HCC)  -     POC Glucose      Rocephin injection given in office  Will start Bactrim p.o. twice daily ASAP  He will go straight to the pharmacy to  the Trulicity to get started on this ASAP  Recheck in 2 days  Report to the ER immediately if develops fever or if symptoms worsen  Patient verbalized understanding           "

## 2022-12-30 ENCOUNTER — HOSPITAL ENCOUNTER (EMERGENCY)
Facility: HOSPITAL | Age: 32
Discharge: HOME | End: 2022-12-30
Attending: EMERGENCY MEDICINE | Admitting: EMERGENCY MEDICINE
Payer: MEDICAID

## 2022-12-30 ENCOUNTER — OFFICE VISIT (OUTPATIENT)
Dept: INTERNAL MEDICINE | Facility: CLINIC | Age: 32
End: 2022-12-30

## 2022-12-30 VITALS
DIASTOLIC BLOOD PRESSURE: 90 MMHG | HEIGHT: 69 IN | WEIGHT: 224 LBS | BODY MASS INDEX: 33.18 KG/M2 | TEMPERATURE: 98.4 F | OXYGEN SATURATION: 99 % | SYSTOLIC BLOOD PRESSURE: 129 MMHG | HEART RATE: 85 BPM | RESPIRATION RATE: 18 BRPM

## 2022-12-30 VITALS
RESPIRATION RATE: 20 BRPM | TEMPERATURE: 97.1 F | OXYGEN SATURATION: 97 % | DIASTOLIC BLOOD PRESSURE: 68 MMHG | SYSTOLIC BLOOD PRESSURE: 124 MMHG | BODY MASS INDEX: 33.18 KG/M2 | HEART RATE: 91 BPM | HEIGHT: 69 IN | WEIGHT: 224 LBS

## 2022-12-30 DIAGNOSIS — L03.317 CELLULITIS OF LEFT BUTTOCK: Primary | ICD-10-CM

## 2022-12-30 DIAGNOSIS — L03.317 CELLULITIS AND ABSCESS OF BUTTOCK: Primary | ICD-10-CM

## 2022-12-30 DIAGNOSIS — E11.649 UNCONTROLLED TYPE 2 DIABETES MELLITUS WITH HYPOGLYCEMIA WITHOUT COMA: ICD-10-CM

## 2022-12-30 DIAGNOSIS — L02.31 CELLULITIS AND ABSCESS OF BUTTOCK: Primary | ICD-10-CM

## 2022-12-30 PROCEDURE — 99282 EMERGENCY DEPT VISIT SF MDM: CPT

## 2022-12-30 PROCEDURE — 99214 OFFICE O/P EST MOD 30 MIN: CPT | Performed by: NURSE PRACTITIONER

## 2022-12-30 NOTE — ED PROVIDER NOTES
EMERGENCY DEPARTMENT ENCOUNTER    Pt Name: Bradley Mukherjee  MRN: [id number]  Pt :   1990  Room Number:  24SF/24  Date of encounter:  2022  PCP: Chandni Mckeon APRN  ED Provider: REGGIE Ty    Historian: Patient    HPI:  Chief Complaint: Abscess left buttock        Context: Bradley Mukherjee is a 32 y.o. male who presents to the ED c/o abscess to the left buttock seen by his primary care provider this week and given an injection of Rocephin followed by cephalexin.  This morning, the site drained profusely.  He called his doctor out of concern who advised him to seek emergency care.  Mr. Mukherjee states that the site has become much less painful since this happened.     Review of systems is negative for fever or chills.  Negative for chest pain or cough or shortness of breath.  GI and  systems are negative.  No profound weakness, dizziness or syncope.  No neurosensory complaint or focal weakness.    Patient has a history of diabetes mellitus.      PAST MEDICAL HISTORY  Past Medical History:   Diagnosis Date   • Anxiety and depression 2020   • Bipolar disorder  2020   • Diastematomyelia  2020   • History of drug abuse (HCC) 2010    Crack   • History of heart attack     Due to cocaine overdose   • Hx of tooth extraction 2021   • Hypertension 2020   • Injury of back    • Stroke (HCC)          PAST SURGICAL HISTORY  Past Surgical History:   Procedure Laterality Date   • TEETH EXTRACTION           FAMILY HISTORY  Family History   Adopted: Yes   Problem Relation Age of Onset   • No Known Problems Mother          SOCIAL HISTORY  Social History     Socioeconomic History   • Marital status: Single   Tobacco Use   • Smoking status: Every Day     Packs/day: 0.50     Years: 15.00     Pack years: 7.50     Types: Cigarettes   • Smokeless tobacco: Never   • Tobacco comments:     Tried patches. But they did not help.   Vaping Use   • Vaping Use: Never used   Substance  and Sexual Activity   • Alcohol use: Yes     Comment: VERY SELDOM   • Drug use: Yes     Types: Marijuana, Cocaine(coke)     Comment: LAST DRUG USE 2012   • Sexual activity: Defer         ALLERGIES  Other        REVIEW OF SYSTEMS  Review of Systems     All systems reviewed and negative except for those discussed in HPI.       PHYSICAL EXAM    I have reviewed the triage vital signs and nursing notes.    ED Triage Vitals [12/30/22 1246]   Temp Heart Rate Resp BP SpO2   98.4 °F (36.9 °C) 80 16 129/90 96 %      Temp src Heart Rate Source Patient Position BP Location FiO2 (%)   Oral Monitor Sitting Left arm --       Physical Exam  GENERAL:   Appears in no acute distress.  His vital signs are normal.    HENT: Nares patent.  EYES: No scleral icterus.  CV: Regular rhythm, regular rate.  RESPIRATORY: Normal effort.  No audible wheezes, rales or rhonchi.  ABDOMEN: Soft, nontender  MUSCULOSKELETAL: No deformities.   NEURO: Alert, moves all extremities, follows commands.  SKIN: Warm, dry, no rash visualized.  Examination of the left buttock reveals there is a 2 cm very fluctuant area of cellulitis the top of this once-abscess is completely eroded off..  The local skin is indurated and flat and nontender.  This is not a pilonidal cyst nor does it communicate with his anus.      LAB RESULTS  No results found for this or any previous visit (from the past 24 hour(s)).    If labs were ordered, I independently reviewed the results and considered them in treating the patient.        RADIOLOGY  No Radiology Exams Resulted Within Past 24 Hours          PROCEDURES    Procedures    No orders to display       MEDICATIONS GIVEN IN ER    Medications - No data to display      MEDICAL DECISION MAKING, PROGRESS, and CONSULTS    All labs have been independently reviewed by me.  All radiology studies have been reviewed by me and the radiologist dictating the report.  All EKG's have been independently viewed and interpreted by me.               Orders placed during this visit:  No orders of the defined types were placed in this encounter.        Additional orders considered but not ordered:    ED Course:    Consultants:      ED Course as of 12/30/22 1421   Fri Dec 30, 2022   1414 Patient's abscess has erupted and the top of this fluctuant site is completely debrided and the site is now flat and nontender with some local erythema remaining.  The site is not a pilonidal cyst nor does it communicate with his anus.  He will continue sitz bath's and complete his oral antibiotics while keeping his skin cool and dry in his recovery.  He will take some Bactroban locally to help facilitate best recovery.  We discussed parameters for concern that would warrant return to the emergency department.  Mr. Mukherjee understands and concurs this outpatient plan and close follow-up [MS]      ED Course User Index  [MS] Arlen Matthews, REGGIE                  AS OF 14:21 EST VITALS:    BP - 129/90  HR - 85  TEMP - 98.4 °F (36.9 °C) (Oral)  O2 SATS - 99%                  DIAGNOSIS  Final diagnoses:   Cellulitis of left buttock         DISPOSITION    DISCHARGE    Patient discharged in stable condition.    Reviewed implications of results, diagnosis, meds, responsibility to follow up, warning signs and symptoms of possible worsening, potential complications and reasons to return to ER.    Patient/Family voiced understanding of above instructions.    Discussed plan for discharge, as there is no emergent indication for admission.  Pt/family is agreeable and understands need for follow up and possible repeat testing.  Pt/family is aware that discharge does not mean that nothing is wrong but that it indicates no emergency is currently present that requires admission and they must continue care with follow-up as given below or with a physician of their choice.     FOLLOW-UP  Chandni Mckeon, APRN  [address] [facility] [address]  [address] [phone]               Medication List      New Prescriptions    mupirocin 2 % ointment  Commonly known as: BACTROBAN  Apply 1 application topically to the appropriate area as directed 3 (Three) Times a Day.           Where to Get Your Medications      These medications were sent to [facility]/pharmacy #[id number] - [place], [place] - [address] [facility] [phone]  - [phone]   [address] [facility] [address]    Hours: 24-hours Phone: [phone]   · mupirocin 2 % ointment             Please note that portions of this document were completed with voice recognition software.      Arlen Matthews, APRN  12/30/22 4409

## 2022-12-30 NOTE — PROGRESS NOTES
Subjective   Bradley Mukherjee is a 32 y.o. male    Chief Complaint   Patient presents with   • Abscess     Buttocks, 2 day f/u, sx's/pain is worsening     History of Present Illness     Patient presents for today f/u on  large boil/abscess on the left inner buttock.  Patient reports that symptoms are worsening.  Has been taking antibiotics as directed.  Has also been soaking in warm Epson salt baths.  States that last night the area did burst open.  His wife applied a dressing.  Denies fever chills.      Deny is a type II diabetic.  His blood sugars are uncontrolled.  He was recently seen and his A1c was greater than 11.  He had been taking metformin as directed, but had not been seen since May.  Trulicity was added to his regimen but he has only had 1 dose of this medication, as he was only able to pick it up yesterday.    The following portions of the patient's history were reviewed and updated as appropriate: allergies, current medications, past family history, past medical history, past social history, past surgical history and problem list.    Current Outpatient Medications:   •  atorvastatin (LIPITOR) 80 MG tablet, Take 1 tablet by mouth every night at bedtime., Disp: 90 tablet, Rfl: 1  •  cetirizine (zyrTEC) 10 MG tablet, Take 1 tablet by mouth Daily., Disp: 90 tablet, Rfl: 1  •  clotrimazole (LOTRIMIN) 1 % cream, Apply 1 application topically to the appropriate area as directed 2 (Two) Times a Day., Disp: 28 g, Rfl: 2  •  fenofibrate (Tricor) 145 MG tablet, Take 1 tablet by mouth Daily., Disp: 90 tablet, Rfl: 1  •  FLUoxetine (PROzac) 20 MG capsule, Take 1 capsule by mouth Daily., Disp: 90 capsule, Rfl: 1  •  Glucose Blood (Blood Glucose Test) strip, Use with glucometer daily as directed, E11.9, Disp: 100 each, Rfl: 1  •  glucose monitor monitoring kit, Use as directed, E11.9, Disp: 1 each, Rfl: 0  •  Lancet Device misc, Use with glucometer daily as directed, E11.9, Disp: 100 each, Rfl: 1  •  metFORMIN  "ER (GLUCOPHAGE-XR) 500 MG 24 hr tablet, Take 1 tablet by mouth Daily With Breakfast., Disp: 120 tablet, Rfl: 1  •  metoprolol succinate XL (TOPROL-XL) 25 MG 24 hr tablet, Take 1 tablet by mouth Daily., Disp: 90 tablet, Rfl: 1  •  Semaglutide,0.25 or 0.5MG/DOS, (Ozempic, 0.25 or 0.5 MG/DOSE,) 2 MG/1.5ML solution pen-injector, Inject 0.25 mg under the skin into the appropriate area as directed 1 (One) Time Per Week., Disp: 1.5 mL, Rfl: 1  •  sulfamethoxazole-trimethoprim (Bactrim DS) 800-160 MG per tablet, Take 1 tablet by mouth 2 (Two) Times a Day for 10 days., Disp: 20 tablet, Rfl: 0     Review of Systems   Constitutional: Negative for chills, fatigue and fever.   Respiratory: Negative for cough, chest tightness and shortness of breath.    Cardiovascular: Negative for chest pain.   Gastrointestinal: Negative for abdominal pain, diarrhea, nausea and vomiting.   Endocrine: Negative for cold intolerance and heat intolerance.   Musculoskeletal: Negative for arthralgias.   Skin: Positive for wound (boil).   Neurological: Negative for dizziness.       Objective   Physical Exam  Skin:             Vitals:    12/30/22 1149   BP: 124/68   Pulse: 91   Resp: 20   Temp: 97.1 °F (36.2 °C)   TempSrc: Infrared   SpO2: 97%   Weight: 102 kg (224 lb)   Height: 174.8 cm (68.82\")         Assessment & Plan   Diagnoses and all orders for this visit:    1. Cellulitis and abscess of buttock (Primary)    2. Uncontrolled type 2 diabetes mellitus with hypoglycemia without coma (HCC)      Patient sent on to the ER for further evaluation and higher level of care, to possibly include IV antibiotics or open I&D.  Report called to Elle at [facility] ER at approximately 1200  Return to clinic with any problems           "

## 2022-12-30 NOTE — DISCHARGE INSTRUCTIONS
Continue your oral antibiotics.  Strict skin care: Sitz baths as discussed perform 3 times a day.  Apply antibiotic ointment twice daily.  Strive to keep the healing area cool and dry.  Maintain your best hydration and nutrition.  Avoid any unnecessary carbohydrate.  Follow-up with your primary care provider to monitor your recovery.  Thank you

## 2023-01-02 PROBLEM — N48.1 BALANITIS: Status: ACTIVE | Noted: 2023-01-02

## 2023-01-02 NOTE — ASSESSMENT & PLAN NOTE
Chronic, stable, continue current meds, follow up as directed.  Continue fenofibrate 145 and Lipitor 80

## 2023-01-02 NOTE — ASSESSMENT & PLAN NOTE
Diabetes is worsening. A1c increased to 11.9, continue metformin, start Trulicity. Check fasting sugar daily and f/u in 4 weeks. Work on diet changes, decrease carbs

## 2023-01-19 ENCOUNTER — OFFICE VISIT (OUTPATIENT)
Dept: INTERNAL MEDICINE | Facility: CLINIC | Age: 33
End: 2023-01-19
Payer: MEDICAID

## 2023-01-19 VITALS
SYSTOLIC BLOOD PRESSURE: 122 MMHG | DIASTOLIC BLOOD PRESSURE: 86 MMHG | OXYGEN SATURATION: 97 % | HEART RATE: 87 BPM | WEIGHT: 226 LBS | RESPIRATION RATE: 20 BRPM | HEIGHT: 69 IN | BODY MASS INDEX: 33.47 KG/M2 | TEMPERATURE: 97.3 F

## 2023-01-19 DIAGNOSIS — E11.649 UNCONTROLLED TYPE 2 DIABETES MELLITUS WITH HYPOGLYCEMIA WITHOUT COMA: Primary | ICD-10-CM

## 2023-01-19 DIAGNOSIS — R82.90 FOUL SMELLING URINE: ICD-10-CM

## 2023-01-19 DIAGNOSIS — F17.210 CIGARETTE SMOKER: ICD-10-CM

## 2023-01-19 LAB
BILIRUB BLD-MCNC: NEGATIVE MG/DL
CLARITY, POC: CLEAR
COLOR UR: YELLOW
EXPIRATION DATE: ABNORMAL
GLUCOSE BLDC GLUCOMTR-MCNC: 180 MG/DL (ref 70–130)
GLUCOSE UR STRIP-MCNC: ABNORMAL MG/DL
KETONES UR QL: NEGATIVE
LEUKOCYTE EST, POC: NEGATIVE
Lab: ABNORMAL
NITRITE UR-MCNC: NEGATIVE MG/ML
PH UR: 6 [PH] (ref 5–8)
PROT UR STRIP-MCNC: NEGATIVE MG/DL
RBC # UR STRIP: NEGATIVE /UL
SP GR UR: 1.02 (ref 1–1.03)
UROBILINOGEN UR QL: NORMAL

## 2023-01-19 PROCEDURE — 99214 OFFICE O/P EST MOD 30 MIN: CPT | Performed by: NURSE PRACTITIONER

## 2023-01-19 PROCEDURE — 82962 GLUCOSE BLOOD TEST: CPT | Performed by: NURSE PRACTITIONER

## 2023-01-19 RX ORDER — SEMAGLUTIDE 1.34 MG/ML
0.5 INJECTION, SOLUTION SUBCUTANEOUS WEEKLY
Qty: 3 ML | Refills: 1 | Status: SHIPPED | OUTPATIENT
Start: 2023-01-19

## 2023-01-19 RX ORDER — LANCETS 28 GAUGE
EACH MISCELLANEOUS
COMMUNITY
Start: 2023-01-13

## 2023-01-19 NOTE — PROGRESS NOTES
Subjective   Bradley Mukherjee is a 32 y.o. male    Chief Complaint   Patient presents with   • Cellulitis     Buttocks f/u- totally gone   • Diabetes     F/u a1c on 12/22/22 11.9     History of Present Illness     DM II - chronic; Deny was dx'd in May 2022.  Was started on metformin, but did not f/u.  Was seen Dec and started on trulicity, but insurance would not cover and it was changed to Ozempic x 4 weeks now.  He is tolerating well w/o SE.  He is checking his FS about 3 times per day.  Averaging ; but spiking in the AM while fasting.    He states that he has completely changed his diet.  Has cut out energy drinks!    Did have cellulitis of the buttock on 12/30/22.  Was sent to the ER and sent home on oral antibiotics.  States that symptoms have resolved.    His only complaint today is foul-smelling urine.  No other urinary symptoms.    The following portions of the patient's history were reviewed and updated as appropriate: allergies, current medications, past family history, past medical history, past social history, past surgical history and problem list.    Current Outpatient Medications:   •  atorvastatin (LIPITOR) 80 MG tablet, Take 1 tablet by mouth every night at bedtime., Disp: 90 tablet, Rfl: 1  •  cetirizine (zyrTEC) 10 MG tablet, Take 1 tablet by mouth Daily., Disp: 90 tablet, Rfl: 1  •  fenofibrate (Tricor) 145 MG tablet, Take 1 tablet by mouth Daily., Disp: 90 tablet, Rfl: 1  •  FLUoxetine (PROzac) 20 MG capsule, Take 1 capsule by mouth Daily., Disp: 90 capsule, Rfl: 1  •  Glucose Blood (Blood Glucose Test) strip, Use with glucometer daily as directed, E11.9, Disp: 100 each, Rfl: 1  •  glucose monitor monitoring kit, Use as directed, E11.9, Disp: 1 each, Rfl: 0  •  Lancet Device misc, Use with glucometer daily as directed, E11.9, Disp: 100 each, Rfl: 1  •  Lancets (freestyle) lancets, USE WITH METER TO CHECK BLOOD SUGAR DAILY AS DIRECTED, E11.9, Disp: , Rfl:   •  metFORMIN ER  (GLUCOPHAGE-XR) 500 MG 24 hr tablet, Take 1 tablet by mouth Daily With Breakfast., Disp: 120 tablet, Rfl: 1  •  metoprolol succinate XL (TOPROL-XL) 25 MG 24 hr tablet, Take 1 tablet by mouth Daily., Disp: 90 tablet, Rfl: 1  •  Semaglutide,0.25 or 0.5MG/DOS, (Ozempic, 0.25 or 0.5 MG/DOSE,) 2 MG/1.5ML solution pen-injector, Inject 0.5 mg under the skin into the appropriate area as directed 1 (One) Time Per Week., Disp: 3 mL, Rfl: 1  •  cefuroxime (CEFTIN) 250 MG tablet, Take 1 tablet by mouth 2 (Two) Times a Day for 7 days., Disp: 14 tablet, Rfl: 0     Review of Systems   Constitutional: Negative for chills, fatigue and fever.   Respiratory: Negative for cough, chest tightness and shortness of breath.    Cardiovascular: Negative for chest pain.   Gastrointestinal: Negative for abdominal pain, diarrhea, nausea and vomiting.   Endocrine: Negative for cold intolerance and heat intolerance.   Musculoskeletal: Negative for arthralgias.   Neurological: Negative for dizziness.       Objective   Physical Exam  Constitutional:       Appearance: He is well-developed.   HENT:      Head: Normocephalic and atraumatic.   Eyes:      Conjunctiva/sclera: Conjunctivae normal.      Pupils: Pupils are equal, round, and reactive to light.   Cardiovascular:      Rate and Rhythm: Normal rate and regular rhythm.      Heart sounds: Normal heart sounds.   Pulmonary:      Effort: Pulmonary effort is normal.      Breath sounds: Normal breath sounds.   Abdominal:      General: Bowel sounds are normal.      Palpations: Abdomen is soft.   Musculoskeletal:         General: Normal range of motion.      Cervical back: Normal range of motion.   Skin:     General: Skin is warm and dry.   Neurological:      Mental Status: He is alert and oriented to person, place, and time.      Deep Tendon Reflexes: Reflexes are normal and symmetric.   Psychiatric:         Behavior: Behavior normal.         Thought Content: Thought content normal.         Judgment:  "Judgment normal.       Vitals:    01/19/23 1341   BP: 122/86   Cuff Size: Adult   Pulse: 87   Resp: 20   Temp: 97.3 °F (36.3 °C)   TempSrc: Infrared   SpO2: 97%   Weight: 103 kg (226 lb)   Height: 174.8 cm (68.82\")         Assessment & Plan   Diagnoses and all orders for this visit:    1. Uncontrolled type 2 diabetes mellitus with hypoglycemia without coma (HCC) (Primary)  -     POCT Glucose  -     Semaglutide,0.25 or 0.5MG/DOS, (Ozempic, 0.25 or 0.5 MG/DOSE,) 2 MG/1.5ML solution pen-injector; Inject 0.5 mg under the skin into the appropriate area as directed 1 (One) Time Per Week.  Dispense: 3 mL; Refill: 1    2. Cigarette smoker    3. Foul smelling urine  -     POC Urinalysis Dipstick, Automated  -     Urine Culture - , Urine, Clean Catch; Future  -     Urine Culture - Urine, Urine, Clean Catch      Ozempic increased to 0.5 mg once weekly  UA in office clear, sent for culture, will call with results  Increase fluids  Return in about 2 months (around 3/19/2023) for f/u.             "

## 2023-01-19 NOTE — PATIENT INSTRUCTIONS
"Diabetes Mellitus and Exercise  Exercising regularly is important for overall health, especially for people who have diabetes mellitus. Exercising is not only about losing weight. It has many other health benefits, such as increasing muscle strength and bone density and reducing body fat and stress. This leads to improved fitness, flexibility, and endurance, all of which result in better overall health.  What are the benefits of exercise if I have diabetes?  Exercise has many benefits for people with diabetes. They include:  Helping to lower and control blood sugar (glucose).  Helping the body to respond better to the hormone insulin by improving insulin sensitivity.  Reducing how much insulin the body needs.  Lowering the risk for heart disease by:  Lowering \"bad\" cholesterol and triglyceride levels.  Increasing \"good\" cholesterol levels.  Lowering blood pressure.  Lowering blood glucose levels.  What is my activity plan?  Your health care provider or certified diabetes educator can help you make a plan for the type and frequency of exercise that works for you. This is called your activity plan. Be sure to:  Get at least 150 minutes of medium-intensity or high-intensity exercise each week. Exercises may include brisk walking, biking, or water aerobics.  Do stretching and strengthening exercises, such as yoga or weight lifting, at least 2 times a week.  Spread out your activity over at least 3 days of the week.  Get some form of physical activity each day.  Do not go more than 2 days in a row without some kind of physical activity.  Avoid being inactive for more than 90 minutes at a time. Take frequent breaks to walk or stretch.  Choose exercises or activities that you enjoy. Set realistic goals.  Start slowly and gradually increase your exercise intensity over time.  How do I manage my diabetes during exercise?  Monitor your blood glucose  Check your blood glucose before and after exercising. If your blood glucose " is:  240 mg/dL (13.3 mmol/L) or higher before you exercise, check your urine for ketones. These are chemicals created by the liver. If you have ketones in your urine, do not exercise until your blood glucose returns to normal.  100 mg/dL (5.6 mmol/L) or lower, eat a snack containing 15-20 grams of carbohydrate. Check your blood glucose 15 minutes after the snack to make sure that your glucose level is above 100 mg/dL (5.6 mmol/L) before you start your exercise.  Know the symptoms of low blood glucose (hypoglycemia) and how to treat it. Your risk for hypoglycemia increases during and after exercise.  Follow these tips and your health care provider's instructions  Keep a carbohydrate snack that is fast-acting for use before, during, and after exercise to help prevent or treat hypoglycemia.  Avoid injecting insulin into areas of the body that are going to be exercised. For example, avoid injecting insulin into:  Your arms, when you are about to play tennis.  Your legs, when you are about to go jogging.  Keep records of your exercise habits. Doing this can help you and your health care provider adjust your diabetes management plan as needed. Write down:  Food that you eat before and after you exercise.  Blood glucose levels before and after you exercise.  The type and amount of exercise you have done.  Work with your health care provider when you start a new exercise or activity. He or she may need to:  Make sure that the activity is safe for you.  Adjust your insulin, other medicines, and food that you eat.  Drink plenty of water while you exercise. This prevents loss of water (dehydration) and problems caused by a lot of heat in the body (heat stroke).  Where to find more information  American Diabetes Association: www.diabetes.org  Summary  Exercising regularly is important for overall health, especially for people who have diabetes mellitus.  Exercising has many health benefits. It increases muscle strength and bone  density and reduces body fat and stress. It also lowers and controls blood glucose.  Your health care provider or certified diabetes educator can help you make an activity plan for the type and frequency of exercise that works for you.  Work with your health care provider to make sure any new activity is safe for you. Also work with your health care provider to adjust your insulin, other medicines, and the food you eat.  This information is not intended to replace advice given to you by your health care provider. Make sure you discuss any questions you have with your health care provider.  Document Revised: 09/14/2020 Document Reviewed: 09/14/2020  [employer] Patient Education © 2022 [employer]  Diabetes Mellitus and Nutrition, Adult  When you have diabetes, or diabetes mellitus, it is very important to have healthy eating habits because your blood sugar (glucose) levels are greatly affected by what you eat and drink. Eating healthy foods in the right amounts, at about the same times every day, can help you:  Manage your blood glucose.  Lower your risk of heart disease.  Improve your blood pressure.  Reach or maintain a healthy weight.  What can affect my meal plan?  Every person with diabetes is different, and each person has different needs for a meal plan. Your health care provider may recommend that you work with a dietitian to make a meal plan that is best for you. Your meal plan may vary depending on factors such as:  The calories you need.  The medicines you take.  Your weight.  Your blood glucose, blood pressure, and cholesterol levels.  Your activity level.  Other health conditions you have, such as heart or kidney disease.  How do carbohydrates affect me?  Carbohydrates, also called carbs, affect your blood glucose level more than any other type of food. Eating carbs raises the amount of glucose in your blood.  It is important to know how many carbs you can safely have in each meal. This is different for  "every person. Your dietitian can help you calculate how many carbs you should have at each meal and for each snack.  How does alcohol affect me?  Alcohol can cause a decrease in blood glucose (hypoglycemia), especially if you use insulin or take certain diabetes medicines by mouth. Hypoglycemia can be a life-threatening condition. Symptoms of hypoglycemia, such as sleepiness, dizziness, and confusion, are similar to symptoms of having too much alcohol.  Do not drink alcohol if:  Your health care provider tells you not to drink.  You are pregnant, may be pregnant, or are planning to become pregnant.  If you drink alcohol:  Limit how much you have to:  0-1 drink a day for women.  0-2 drinks a day for men.  Know how much alcohol is in your drink. In the [place] one drink equals one 12 oz bottle of beer (355 mL), one 5 oz glass of wine (148 mL), or one 1½ oz glass of hard liquor (44 mL).  Keep yourself hydrated with water, diet soda, or unsweetened iced tea. Keep in mind that regular soda, juice, and other mixers may contain a lot of sugar and must be counted as carbs.  What are tips for following this plan?  Reading food labels  Start by checking the serving size on the Nutrition Facts label of packaged foods and drinks. The number of calories and the amount of carbs, fats, and other nutrients listed on the label are based on one serving of the item. Many items contain more than one serving per package.  Check the total grams (g) of carbs in one serving.  Check the number of grams of saturated fats and trans fats in one serving. Choose foods that have a low amount or none of these fats.  Check the number of milligrams (mg) of salt (sodium) in one serving. Most people should limit total sodium intake to less than 2,300 mg per day.  Always check the nutrition information of foods labeled as \"low-fat\" or \"nonfat.\" These foods may be higher in added sugar or refined carbs and should be avoided.  Talk to your dietitian to " identify your daily goals for nutrients listed on the label.  Shopping  Avoid buying canned, pre-made, or processed foods. These foods tend to be high in fat, sodium, and added sugar.  Shop around the outside edge of the grocery store. This is where you will most often find fresh fruits and vegetables, bulk grains, fresh meats, and fresh dairy products.  Cooking  Use low-heat cooking methods, such as baking, instead of high-heat cooking methods, such as deep frying.  Cook using healthy oils, such as olive, canola, or sunflower oil.  Avoid cooking with butter, cream, or high-fat meats.  Meal planning  Eat meals and snacks regularly, preferably at the same times every day. Avoid going long periods of time without eating.  Eat foods that are high in fiber, such as fresh fruits, vegetables, beans, and whole grains.  Eat 4-6 oz (112-168 g) of lean protein each day, such as lean meat, chicken, fish, eggs, or tofu. One ounce (oz) (28 g) of lean protein is equal to:  1 oz (28 g) of meat, chicken, or fish.  1 egg.  ¼ cup (62 g) of tofu.  Eat some foods each day that contain healthy fats, such as avocado, nuts, seeds, and fish.  What foods should I eat?  Fruits  Berries. Apples. Oranges. Peaches. Apricots. Plums. Grapes. Mangoes. Papayas. Pomegranates. Kiwi. Cherries.  Vegetables  Leafy greens, including lettuce, spinach, kale, chard, taylor greens, mustard greens, and cabbage. Beets. Cauliflower. Broccoli. Carrots. Green beans. Tomatoes. Peppers. Onions. Cucumbers. Poplar sprouts.  Grains  Whole grains, such as whole-wheat or whole-grain bread, crackers, tortillas, cereal, and pasta. Unsweetened oatmeal. Quinoa. Brown or wild rice.  Meats and other proteins  Seafood. Poultry without skin. Lean cuts of poultry and beef. Tofu. Nuts. Seeds.  Dairy  Low-fat or fat-free dairy products such as milk, yogurt, and cheese.  The items listed above may not be a complete list of foods and beverages you can eat and drink. Contact a  dietitian for more information.  What foods should I avoid?  Fruits  Fruits canned with syrup.  Vegetables  Canned vegetables. Frozen vegetables with butter or cream sauce.  Grains  Refined white flour and flour products such as bread, pasta, snack foods, and cereals. Avoid all processed foods.  Meats and other proteins  Fatty cuts of meat. Poultry with skin. Breaded or fried meats. Processed meat. Avoid saturated fats.  Dairy  Full-fat yogurt, cheese, or milk.  Beverages  Sweetened drinks, such as soda or iced tea.  The items listed above may not be a complete list of foods and beverages you should avoid. Contact a dietitian for more information.  Questions to ask a health care provider  Do I need to meet with a certified diabetes care and ?  Do I need to meet with a dietitian?  What number can I call if I have questions?  When are the best times to check my blood glucose?  Where to find more information:  American Diabetes Association: diabetes.org  Academy of Nutrition and Dietetics: eatright.org  National [place] of Diabetes and Digestive and Kidney Diseases: niddk.nih.gov  Association of Diabetes Care & Education Specialists: diabeteseducator.org  Summary  It is important to have healthy eating habits because your blood sugar (glucose) levels are greatly affected by what you eat and drink. It is important to use alcohol carefully.  A healthy meal plan will help you manage your blood glucose and lower your risk of heart disease.  Your health care provider may recommend that you work with a dietitian to make a meal plan that is best for you.  This information is not intended to replace advice given to you by your health care provider. Make sure you discuss any questions you have with your health care provider.  Document Revised: 07/21/2021 Document Reviewed: 07/21/2021  [employer]vier Patient Education © 2022 [employer]

## 2023-01-25 LAB
BACTERIA UR CULT: ABNORMAL
BACTERIA UR CULT: ABNORMAL
OTHER ANTIBIOTIC SUSC ISLT: ABNORMAL

## 2023-01-26 ENCOUNTER — TELEPHONE (OUTPATIENT)
Dept: INTERNAL MEDICINE | Facility: CLINIC | Age: 33
End: 2023-01-26
Payer: MEDICAID

## 2023-01-26 RX ORDER — CEFUROXIME AXETIL 250 MG/1
250 TABLET ORAL 2 TIMES DAILY
Qty: 14 TABLET | Refills: 0 | Status: SHIPPED | OUTPATIENT
Start: 2023-01-26 | End: 2023-02-02

## 2023-01-26 NOTE — TELEPHONE ENCOUNTER
----- Message from REGGIE Brunner sent at 1/26/2023  8:56 AM EST -----  Urine culture is + for bacteria.  I have sent in an antibiotic to treat.

## 2023-02-21 DIAGNOSIS — E11.65 UNCONTROLLED TYPE 2 DIABETES MELLITUS WITH HYPERGLYCEMIA: ICD-10-CM

## 2023-02-22 RX ORDER — METFORMIN HYDROCHLORIDE 500 MG/1
500 TABLET, EXTENDED RELEASE ORAL
Qty: 120 TABLET | Refills: 1 | Status: SHIPPED | OUTPATIENT
Start: 2023-02-22

## 2023-04-26 ENCOUNTER — OFFICE VISIT (OUTPATIENT)
Dept: INTERNAL MEDICINE | Facility: CLINIC | Age: 33
End: 2023-04-26
Payer: MEDICAID

## 2023-04-26 VITALS
BODY MASS INDEX: 33.47 KG/M2 | TEMPERATURE: 98.4 F | HEIGHT: 69 IN | DIASTOLIC BLOOD PRESSURE: 84 MMHG | OXYGEN SATURATION: 97 % | HEART RATE: 105 BPM | WEIGHT: 226 LBS | SYSTOLIC BLOOD PRESSURE: 122 MMHG

## 2023-04-26 DIAGNOSIS — R19.7 DIARRHEA, UNSPECIFIED TYPE: ICD-10-CM

## 2023-04-26 DIAGNOSIS — R06.2 WHEEZING: ICD-10-CM

## 2023-04-26 DIAGNOSIS — R05.1 ACUTE COUGH: Primary | ICD-10-CM

## 2023-04-26 LAB
EXPIRATION DATE: NORMAL
FLUAV AG UPPER RESP QL IA.RAPID: NOT DETECTED
FLUBV AG UPPER RESP QL IA.RAPID: NOT DETECTED
INTERNAL CONTROL: NORMAL
Lab: NORMAL
SARS-COV-2 AG UPPER RESP QL IA.RAPID: NOT DETECTED

## 2023-04-26 PROCEDURE — 99213 OFFICE O/P EST LOW 20 MIN: CPT | Performed by: FAMILY MEDICINE

## 2023-04-26 PROCEDURE — 1160F RVW MEDS BY RX/DR IN RCRD: CPT | Performed by: FAMILY MEDICINE

## 2023-04-26 PROCEDURE — 3074F SYST BP LT 130 MM HG: CPT | Performed by: FAMILY MEDICINE

## 2023-04-26 PROCEDURE — 1159F MED LIST DOCD IN RCRD: CPT | Performed by: FAMILY MEDICINE

## 2023-04-26 PROCEDURE — 87428 SARSCOV & INF VIR A&B AG IA: CPT | Performed by: FAMILY MEDICINE

## 2023-04-26 PROCEDURE — 3079F DIAST BP 80-89 MM HG: CPT | Performed by: FAMILY MEDICINE

## 2023-04-26 RX ORDER — DEXTROMETHORPHAN HYDROBROMIDE AND PROMETHAZINE HYDROCHLORIDE 15; 6.25 MG/5ML; MG/5ML
5 SYRUP ORAL 4 TIMES DAILY PRN
Qty: 180 ML | Refills: 0 | Status: SHIPPED | OUTPATIENT
Start: 2023-04-26

## 2023-04-26 RX ORDER — ALBUTEROL SULFATE 90 UG/1
2 AEROSOL, METERED RESPIRATORY (INHALATION) EVERY 4 HOURS PRN
Qty: 18 G | Refills: 1 | Status: SHIPPED | OUTPATIENT
Start: 2023-04-26

## 2023-04-26 NOTE — PROGRESS NOTES
"Eduardo Mukherjee is a 32 y.o. male.     Chief Complaint   Patient presents with   • Diarrhea     1 week     • Wheezing     1 wheezing     • Cough       Visit Vitals  /84 (BP Location: Right arm, Patient Position: Sitting, Cuff Size: Adult)   Pulse 105   Temp 98.4 °F (36.9 °C)   Ht 174.8 cm (68.8\")   Wt 103 kg (226 lb)   SpO2 97%   BMI 33.57 kg/m²       Wt Readings from Last 3 Encounters:   04/26/23 103 kg (226 lb)   01/19/23 103 kg (226 lb)   12/30/22 102 kg (224 lb)       Diarrhea   This is a new problem. The current episode started in the past 7 days. The problem occurs 5 to 10 times per day. The stool consistency is described as watery. The patient states that diarrhea awakens him from sleep. Associated symptoms include coughing, headaches and vomiting (2 days ago). Pertinent negatives include no abdominal pain, arthralgias, bloating, chills, fever, increased  flatus, myalgias, sweats, URI or weight loss. There are no known risk factors. He has tried anti-motility drug for the symptoms. There is no history of bowel resection, inflammatory bowel disease, irritable bowel syndrome, malabsorption, a recent abdominal surgery or short gut syndrome.   Wheezing   This is a chronic problem. The current episode started 1 to 4 weeks ago. Associated symptoms include coughing, diarrhea, headaches and vomiting (2 days ago). Pertinent negatives include no abdominal pain, chest pain, chills, coryza, ear pain, fever, hemoptysis, neck pain, rash, rhinorrhea (today after swab), shortness of breath, sore throat, sputum production or swollen glands. The symptoms are aggravated by pollens. Treatments tried: afrin. The treatment provided no relief. His past medical history is significant for CAD, chronic lung disease (used to smoke 4 ppd) and past MI. There is no history of asthma, bronchiolitis, COPD, DVT, heart failure, PE or pneumonia.   Cough  This is a new problem. The current episode started in the past 7 " days. The problem has been unchanged. The problem occurs every few minutes. The cough is non-productive. Associated symptoms include headaches, nasal congestion and wheezing. Pertinent negatives include no chest pain, chills, ear congestion, ear pain, fever, heartburn, hemoptysis, myalgias, postnasal drip, rash, rhinorrhea (today after swab), sore throat, shortness of breath, sweats or weight loss. The symptoms are aggravated by pollens. He has tried nothing for the symptoms. The treatment provided no relief. His past medical history is significant for environmental allergies. There is no history of asthma, COPD or pneumonia.      Pt reports blood sugar is good and he is avoiding sugar.   The following portions of the patient's history were reviewed and updated as appropriate: allergies, current medications, past family history, past medical history, past social history, past surgical history and problem list.    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   • History of drug abuse 2010    Crack   • History of heart attack     Due to cocaine overdose   • Hx of tooth extraction 01/06/2021   • Hypertension 07/02/2020   • Injury of back    • Stroke       Past Surgical History:   Procedure Laterality Date   • TEETH EXTRACTION        Family History   Adopted: Yes   Problem Relation Age of Onset   • No Known Problems Mother       Social History     Socioeconomic History   • Marital status: Single   Tobacco Use   • Smoking status: Every Day     Packs/day: 0.50     Years: 15.00     Pack years: 7.50     Types: Cigarettes   • Smokeless tobacco: Never   • Tobacco comments:     Tried patches. But they did not help.   Vaping Use   • Vaping Use: Never used   Substance and Sexual Activity   • Alcohol use: Yes     Comment: VERY SELDOM   • Drug use: Yes     Types: Marijuana, Cocaine(coke)     Comment: LAST DRUG USE 2012   • Sexual  activity: Defer      Allergies   Allergen Reactions   • Other Other (See Comments)     Narcotics.  Pt states no narcotics.       Review of Systems   Constitutional: Negative for chills, fever and weight loss.   HENT: Negative for ear pain, postnasal drip, rhinorrhea (today after swab) and sore throat.    Respiratory: Positive for cough and wheezing. Negative for hemoptysis, sputum production and shortness of breath.    Cardiovascular: Negative for chest pain.   Gastrointestinal: Positive for diarrhea and vomiting (2 days ago). Negative for abdominal pain, bloating, flatus and heartburn.   Musculoskeletal: Negative for arthralgias, myalgias and neck pain.   Skin: Negative for rash.   Allergic/Immunologic: Positive for environmental allergies.   Neurological: Positive for headaches.       Objective   Physical Exam  Vitals and nursing note reviewed.   Constitutional:       Appearance: He is well-developed.   HENT:      Head: Normocephalic.      Right Ear: External ear normal.      Left Ear: External ear normal.      Nose: Nose normal.   Eyes:      General: Lids are normal.      Conjunctiva/sclera: Conjunctivae normal.      Pupils: Pupils are equal, round, and reactive to light.   Neck:      Thyroid: No thyroid mass or thyromegaly.      Vascular: No carotid bruit.      Trachea: Trachea normal.   Cardiovascular:      Rate and Rhythm: Normal rate and regular rhythm.      Heart sounds: No murmur heard.  Pulmonary:      Effort: Pulmonary effort is normal. No respiratory distress.      Breath sounds: Normal breath sounds. No decreased breath sounds, wheezing, rhonchi or rales.   Chest:      Chest wall: No tenderness.   Abdominal:      General: Bowel sounds are normal.      Palpations: Abdomen is soft.      Tenderness: There is no abdominal tenderness.   Musculoskeletal:         General: Normal range of motion.      Cervical back: Normal range of motion and neck supple.   Skin:     General: Skin is warm and dry.    Neurological:      Mental Status: He is alert and oriented to person, place, and time.   Psychiatric:         Behavior: Behavior normal.         Assessment & Plan   Diagnoses and all orders for this visit:    1. Acute cough (Primary)  -     POCT SARS-CoV-2 Antigen TOBY + Flu  -     promethazine-dextromethorphan (PROMETHAZINE-DM) 6.25-15 MG/5ML syrup; Take 5 mL by mouth 4 (Four) Times a Day As Needed for Cough.  Dispense: 180 mL; Refill: 0    2. Wheezing  -     POCT SARS-CoV-2 Antigen TOBY + Flu  -     albuterol sulfate  (90 Base) MCG/ACT inhaler; Inhale 2 puffs Every 4 (Four) Hours As Needed for Wheezing.  Dispense: 18 g; Refill: 1    3. Diarrhea, unspecified type  -     Gastrointestinal Panel, PCR - Stool, Per Rectum; Future      Clear liquids x 12 hours then advance to BRAT. And then nonspicy food.   Handout on diarrhea.  Patient advised if respiratory symptoms worsen, we can add antibiotics.           Current Outpatient Medications:   •  atorvastatin (LIPITOR) 80 MG tablet, Take 1 tablet by mouth every night at bedtime., Disp: 90 tablet, Rfl: 1  •  cetirizine (zyrTEC) 10 MG tablet, Take 1 tablet by mouth Daily., Disp: 90 tablet, Rfl: 1  •  fenofibrate (Tricor) 145 MG tablet, Take 1 tablet by mouth Daily., Disp: 90 tablet, Rfl: 1  •  FLUoxetine (PROzac) 20 MG capsule, Take 1 capsule by mouth Daily., Disp: 90 capsule, Rfl: 1  •  Glucose Blood (Blood Glucose Test) strip, Use with glucometer daily as directed, E11.9, Disp: 100 each, Rfl: 1  •  glucose monitor monitoring kit, Use as directed, E11.9, Disp: 1 each, Rfl: 0  •  Lancet Device misc, Use with glucometer daily as directed, E11.9, Disp: 100 each, Rfl: 1  •  Lancets (freestyle) lancets, USE WITH METER TO CHECK BLOOD SUGAR DAILY AS DIRECTED, E11.9, Disp: , Rfl:   •  metFORMIN ER (GLUCOPHAGE-XR) 500 MG 24 hr tablet, Take 1 tablet by mouth Daily With Breakfast., Disp: 120 tablet, Rfl: 1  •  metoprolol succinate XL (TOPROL-XL) 25 MG 24 hr tablet, Take 1  tablet by mouth Daily., Disp: 90 tablet, Rfl: 1  •  Semaglutide,0.25 or 0.5MG/DOS, (Ozempic, 0.25 or 0.5 MG/DOSE,) 2 MG/1.5ML solution pen-injector, Inject 0.5 mg under the skin into the appropriate area as directed 1 (One) Time Per Week., Disp: 3 mL, Rfl: 1  •  albuterol sulfate  (90 Base) MCG/ACT inhaler, Inhale 2 puffs Every 4 (Four) Hours As Needed for Wheezing., Disp: 18 g, Rfl: 1  •  promethazine-dextromethorphan (PROMETHAZINE-DM) 6.25-15 MG/5ML syrup, Take 5 mL by mouth 4 (Four) Times a Day As Needed for Cough., Disp: 180 mL, Rfl: 0    Return if symptoms worsen or fail to improve, for Recheck.     Recent Results (from the past 168 hour(s))   POCT SARS-CoV-2 Antigen TOBY + Flu    Collection Time: 04/26/23  2:49 PM    Specimen: Swab   Result Value Ref Range    SARS Antigen Not Detected Not Detected, Presumptive Negative    Influenza A Antigen TOBY Not Detected Not Detected    Influenza B Antigen TOBY Not Detected Not Detected    Internal Control Passed Passed    Lot Number [id number]     Expiration Date 3/16/2024

## 2023-07-22 DIAGNOSIS — E11.65 UNCONTROLLED TYPE 2 DIABETES MELLITUS WITH HYPERGLYCEMIA: ICD-10-CM

## 2023-07-27 RX ORDER — BLOOD-GLUCOSE METER
KIT MISCELLANEOUS
Qty: 100 EACH | Refills: 0 | Status: SHIPPED | OUTPATIENT
Start: 2023-07-27

## 2023-10-21 DIAGNOSIS — E11.65 UNCONTROLLED TYPE 2 DIABETES MELLITUS WITH HYPERGLYCEMIA: ICD-10-CM

## 2023-10-26 RX ORDER — BLOOD-GLUCOSE METER
KIT MISCELLANEOUS
Qty: 100 EACH | Refills: 2 | Status: SHIPPED | OUTPATIENT
Start: 2023-10-26

## 2023-11-15 ENCOUNTER — OFFICE VISIT (OUTPATIENT)
Dept: INTERNAL MEDICINE | Facility: CLINIC | Age: 33
End: 2023-11-15
Payer: MEDICAID

## 2023-11-15 VITALS
HEIGHT: 69 IN | DIASTOLIC BLOOD PRESSURE: 96 MMHG | TEMPERATURE: 97.1 F | OXYGEN SATURATION: 97 % | HEART RATE: 100 BPM | WEIGHT: 218.2 LBS | BODY MASS INDEX: 32.32 KG/M2 | SYSTOLIC BLOOD PRESSURE: 138 MMHG

## 2023-11-15 DIAGNOSIS — E11.65 UNCONTROLLED TYPE 2 DIABETES MELLITUS WITH HYPERGLYCEMIA: ICD-10-CM

## 2023-11-15 DIAGNOSIS — L91.8 ST (SKIN TAG): ICD-10-CM

## 2023-11-15 DIAGNOSIS — H10.31 ACUTE CONJUNCTIVITIS OF RIGHT EYE, UNSPECIFIED ACUTE CONJUNCTIVITIS TYPE: Primary | ICD-10-CM

## 2023-11-15 DIAGNOSIS — I10 ESSENTIAL HYPERTENSION: ICD-10-CM

## 2023-11-15 PROCEDURE — 99214 OFFICE O/P EST MOD 30 MIN: CPT

## 2023-11-15 PROCEDURE — 1159F MED LIST DOCD IN RCRD: CPT

## 2023-11-15 PROCEDURE — 3080F DIAST BP >= 90 MM HG: CPT

## 2023-11-15 PROCEDURE — 3075F SYST BP GE 130 - 139MM HG: CPT

## 2023-11-15 PROCEDURE — 1160F RVW MEDS BY RX/DR IN RCRD: CPT

## 2023-11-15 RX ORDER — METOPROLOL SUCCINATE 25 MG/1
25 TABLET, EXTENDED RELEASE ORAL DAILY
Qty: 90 TABLET | Refills: 1 | Status: SHIPPED | OUTPATIENT
Start: 2023-11-15

## 2023-11-15 RX ORDER — METFORMIN HYDROCHLORIDE 500 MG/1
500 TABLET, EXTENDED RELEASE ORAL
Qty: 120 TABLET | Refills: 1 | Status: SHIPPED | OUTPATIENT
Start: 2023-11-15

## 2023-11-15 RX ORDER — POLYMYXIN B SULFATE AND TRIMETHOPRIM 1; 10000 MG/ML; [USP'U]/ML
1 SOLUTION OPHTHALMIC EVERY 6 HOURS
Qty: 10 ML | Refills: 0 | Status: SHIPPED | OUTPATIENT
Start: 2023-11-15 | End: 2023-11-22

## 2023-11-15 NOTE — PROGRESS NOTES
Office Note     Name: Bradley Mukherjee    : 1990     MRN: [id number]     Chief Complaint  Eye Pain (Redness and has a bump is hanging from rt eye ) and Hypertension (Refills needed )    Subjective     History of Present Illness:  Bradley Mukherjee is a 33 y.o. male who presents today for skin tag on top eyelid. Has always been there but states the beginning of this year it started to grow. Does obstruct his vision some and creates a blind spot in his eye. Not normally painful.     R eye redness- reports pain started yesterday. Has yellowish drainage from R eye, itchy,watery and painful.     HTN-chronic, stable on metoprolol. Does not regularly check his blood pressure at home     DM- chronic, checks sugars fasting in AM, range from 180-260. Takes metformin daily, but has stopped the ozempic because he is afraid of any adverse effects.     Review of Systems   Constitutional: Negative.    HENT: Negative.     Eyes:  Positive for pain, discharge (right), redness, itching and visual disturbance.   Respiratory:  Negative for cough, shortness of breath and wheezing.    Cardiovascular:  Negative for chest pain, palpitations and leg swelling.   Endocrine: Negative.    Genitourinary: Negative.    Skin:         Skin tag to upper eyelid of right eye   Allergic/Immunologic: Negative.    Neurological: Negative.  Negative for dizziness and headaches.   Hematological: Negative.    Psychiatric/Behavioral: Negative.         Past Medical History:   Diagnosis Date    Anxiety and depression 2020    Bipolar disorder  2020    Diastematomyelia  2020    History of drug abuse 2010    Crack    History of heart attack     Due to cocaine overdose    Hx of tooth extraction 2021    Hypertension 2020    Injury of back     Stroke      Past Surgical History:   Procedure Laterality Date    TEETH EXTRACTION         Current Outpatient Medications:     atorvastatin (LIPITOR) 80 MG tablet, Take 1 tablet by  "mouth every night at bedtime., Disp: 90 tablet, Rfl: 1    cetirizine (zyrTEC) 10 MG tablet, Take 1 tablet by mouth Daily., Disp: 90 tablet, Rfl: 1    FLUoxetine (PROzac) 20 MG capsule, Take 1 capsule by mouth Daily., Disp: 90 capsule, Rfl: 1    glucose blood (FREESTYLE LITE) test strip, CHECK BLOOD SUGAR DAILY AS DIRECTED, E11.9, Disp: 100 each, Rfl: 2    glucose monitor monitoring kit, Use as directed, E11.9, Disp: 1 each, Rfl: 0    Lancet Device misc, Use with glucometer daily as directed, E11.9, Disp: 100 each, Rfl: 1    Lancets (freestyle) lancets, USE WITH METER TO CHECK BLOOD SUGAR DAILY AS DIRECTED, E11.9, Disp: , Rfl:     metFORMIN ER (GLUCOPHAGE-XR) 500 MG 24 hr tablet, Take 1 tablet by mouth Daily With Breakfast., Disp: 120 tablet, Rfl: 1    metoprolol succinate XL (TOPROL-XL) 25 MG 24 hr tablet, Take 1 tablet by mouth Daily., Disp: 90 tablet, Rfl: 1    trimethoprim-polymyxin b (POLYTRIM) 97620-3.1 UNIT/ML-% ophthalmic solution, Administer 1 drop to the right eye Every 6 (Six) Hours for 7 days., Disp: 10 mL, Rfl: 0  Allergies   Allergen Reactions    Other Other (See Comments)     Narcotics.  Pt states no narcotics.       Objective     Vital Signs  /96   Pulse 100   Temp 97.1 °F (36.2 °C) (Temporal)   Ht 174.8 cm (68.82\")   Wt 99 kg (218 lb 3.2 oz)   SpO2 97%   BMI 32.39 kg/m²   Estimated body mass index is 32.39 kg/m² as calculated from the following:    Height as of this encounter: 174.8 cm (68.82\").    Weight as of this encounter: 99 kg (218 lb 3.2 oz).        Physical Exam  Constitutional:       Appearance: Normal appearance.   HENT:      Head: Normocephalic and atraumatic.      Right Ear: Tympanic membrane, ear canal and external ear normal. There is no impacted cerumen.      Left Ear: Tympanic membrane, ear canal and external ear normal. There is no impacted cerumen.      Nose: Nose normal.      Mouth/Throat:      Mouth: Mucous membranes are moist.      Pharynx: Oropharynx is clear. No " oropharyngeal exudate or posterior oropharyngeal erythema.   Eyes:      General:         Right eye: Discharge present.      Conjunctiva/sclera:      Right eye: Right conjunctiva is injected. Exudate present.     Cardiovascular:      Rate and Rhythm: Normal rate and regular rhythm.      Pulses: Normal pulses.      Heart sounds: Normal heart sounds. No murmur heard.  Pulmonary:      Effort: Pulmonary effort is normal. No respiratory distress.      Breath sounds: Normal breath sounds. No wheezing or rhonchi.   Abdominal:      Palpations: Abdomen is soft.      Tenderness: There is no abdominal tenderness.   Skin:     General: Skin is warm and dry.      Capillary Refill: Capillary refill takes less than 2 seconds.   Neurological:      General: No focal deficit present.      Mental Status: He is alert and oriented to person, place, and time. Mental status is at baseline.   Psychiatric:         Mood and Affect: Mood normal.         Behavior: Behavior normal.         Thought Content: Thought content normal.         Judgment: Judgment normal.               Assessment and Plan     Diagnoses and all orders for this visit:    1. Acute conjunctivitis of right eye, unspecified acute conjunctivitis type (Primary)  -     trimethoprim-polymyxin b (POLYTRIM) 63656-3.1 UNIT/ML-% ophthalmic solution; Administer 1 drop to the right eye Every 6 (Six) Hours for 7 days.  Dispense: 10 mL; Refill: 0    2. Essential hypertension  -     metoprolol succinate XL (TOPROL-XL) 25 MG 24 hr tablet; Take 1 tablet by mouth Daily.  Dispense: 90 tablet; Refill: 1    3. Uncontrolled type 2 diabetes mellitus with hyperglycemia  -     metFORMIN ER (GLUCOPHAGE-XR) 500 MG 24 hr tablet; Take 1 tablet by mouth Daily With Breakfast.  Dispense: 120 tablet; Refill: 1    4. ST (skin tag)  -     Ambulatory Referral to Ophthalmology     -med refills sent. Pt BP slightly elevated in office today, encouraged to check BP at home   -referral sent to ophthalmology for  further evaluation of skin tag     If a referral was made please contact our office if you have not heard about an appointment in the next 2 weeks.   If labs or images are ordered we will contact you with the results within the next week.  If you have not heard from us after a week please call our office to inquire about the results.    Follow Up  Return for Next scheduled follow up with Ben.    REGGIE Foreman

## 2024-01-22 DIAGNOSIS — E11.65 UNCONTROLLED TYPE 2 DIABETES MELLITUS WITH HYPERGLYCEMIA: ICD-10-CM

## 2024-01-22 RX ORDER — METFORMIN HYDROCHLORIDE 500 MG/1
500 TABLET, EXTENDED RELEASE ORAL
Qty: 30 TABLET | Refills: 0 | Status: SHIPPED | OUTPATIENT
Start: 2024-01-22 | End: 2024-01-29 | Stop reason: SDUPTHER

## 2024-01-23 ENCOUNTER — TELEPHONE (OUTPATIENT)
Dept: INTERNAL MEDICINE | Facility: CLINIC | Age: 34
End: 2024-01-23
Payer: MEDICAID

## 2024-01-23 DIAGNOSIS — E55.9 VITAMIN D DEFICIENCY: ICD-10-CM

## 2024-01-23 DIAGNOSIS — E78.2 MIXED HYPERLIPIDEMIA: ICD-10-CM

## 2024-01-23 DIAGNOSIS — E11.9 CONTROLLED TYPE 2 DIABETES MELLITUS WITHOUT COMPLICATION, WITHOUT LONG-TERM CURRENT USE OF INSULIN: Primary | ICD-10-CM

## 2024-01-23 DIAGNOSIS — I10 ESSENTIAL HYPERTENSION: ICD-10-CM

## 2024-01-23 NOTE — TELEPHONE ENCOUNTER
Caller: JESSICA MENJIVAR    Relationship: Other Relative    Best call back number: [phone]     What orders are you requesting (i.e. lab or imaging): BLOOD WORK     In what timeframe would the patient need to come in: 1/24/24    Where will you receive your lab/imaging services: IN OFFICE     Additional notes: PATIENT HAS AN APPOINTMENT WITH PCP ON 1/29/24 AT 1:45 PM AND WOULD LIKE TO DISCUSS LAB RESULTS DURING THE APPOINTMENT.

## 2024-01-25 ENCOUNTER — LAB (OUTPATIENT)
Dept: INTERNAL MEDICINE | Facility: CLINIC | Age: 34
End: 2024-01-25
Payer: MEDICAID

## 2024-01-25 DIAGNOSIS — E78.2 MIXED HYPERLIPIDEMIA: ICD-10-CM

## 2024-01-25 DIAGNOSIS — E11.9 CONTROLLED TYPE 2 DIABETES MELLITUS WITHOUT COMPLICATION, WITHOUT LONG-TERM CURRENT USE OF INSULIN: ICD-10-CM

## 2024-01-25 DIAGNOSIS — E55.9 VITAMIN D DEFICIENCY: ICD-10-CM

## 2024-01-25 DIAGNOSIS — I10 ESSENTIAL HYPERTENSION: ICD-10-CM

## 2024-01-26 LAB
25(OH)D3+25(OH)D2 SERPL-MCNC: 29.3 NG/ML (ref 30–100)
ALBUMIN SERPL-MCNC: 4.2 G/DL (ref 3.5–5.2)
ALBUMIN/GLOB SERPL: 1.7 G/DL
ALP SERPL-CCNC: 109 U/L (ref 39–117)
ALT SERPL-CCNC: 45 U/L (ref 1–41)
AST SERPL-CCNC: 17 U/L (ref 1–40)
BASOPHILS # BLD AUTO: 0.02 10*3/MM3 (ref 0–0.2)
BASOPHILS NFR BLD AUTO: 0.3 % (ref 0–1.5)
BILIRUB SERPL-MCNC: 0.2 MG/DL (ref 0–1.2)
BUN SERPL-MCNC: 11 MG/DL (ref 6–20)
BUN/CREAT SERPL: 11.3 (ref 7–25)
CALCIUM SERPL-MCNC: 9.1 MG/DL (ref 8.6–10.5)
CHLORIDE SERPL-SCNC: 102 MMOL/L (ref 98–107)
CHOLEST SERPL-MCNC: 104 MG/DL (ref 0–200)
CO2 SERPL-SCNC: 26.3 MMOL/L (ref 22–29)
CREAT SERPL-MCNC: 0.97 MG/DL (ref 0.76–1.27)
EGFRCR SERPLBLD CKD-EPI 2021: 105.7 ML/MIN/1.73
EOSINOPHIL # BLD AUTO: 0.21 10*3/MM3 (ref 0–0.4)
EOSINOPHIL NFR BLD AUTO: 3.3 % (ref 0.3–6.2)
ERYTHROCYTE [DISTWIDTH] IN BLOOD BY AUTOMATED COUNT: 11.5 % (ref 12.3–15.4)
GLOBULIN SER CALC-MCNC: 2.5 GM/DL
GLUCOSE SERPL-MCNC: 222 MG/DL (ref 65–99)
HBA1C MFR BLD: 9.2 % (ref 4.8–5.6)
HCT VFR BLD AUTO: 49.4 % (ref 37.5–51)
HDLC SERPL-MCNC: 28 MG/DL (ref 40–60)
HGB BLD-MCNC: 16.3 G/DL (ref 13–17.7)
IMM GRANULOCYTES # BLD AUTO: 0.01 10*3/MM3 (ref 0–0.05)
IMM GRANULOCYTES NFR BLD AUTO: 0.2 % (ref 0–0.5)
LDLC SERPL CALC-MCNC: 57 MG/DL (ref 0–100)
LYMPHOCYTES # BLD AUTO: 2.22 10*3/MM3 (ref 0.7–3.1)
LYMPHOCYTES NFR BLD AUTO: 35 % (ref 19.6–45.3)
MCH RBC QN AUTO: 29.5 PG (ref 26.6–33)
MCHC RBC AUTO-ENTMCNC: 33 G/DL (ref 31.5–35.7)
MCV RBC AUTO: 89.3 FL (ref 79–97)
MONOCYTES # BLD AUTO: 0.58 10*3/MM3 (ref 0.1–0.9)
MONOCYTES NFR BLD AUTO: 9.1 % (ref 5–12)
NEUTROPHILS # BLD AUTO: 3.3 10*3/MM3 (ref 1.7–7)
NEUTROPHILS NFR BLD AUTO: 52.1 % (ref 42.7–76)
NRBC BLD AUTO-RTO: 0.2 /100 WBC (ref 0–0.2)
PLATELET # BLD AUTO: 192 10*3/MM3 (ref 140–450)
POTASSIUM SERPL-SCNC: 4.7 MMOL/L (ref 3.5–5.2)
PROT SERPL-MCNC: 6.7 G/DL (ref 6–8.5)
RBC # BLD AUTO: 5.53 10*6/MM3 (ref 4.14–5.8)
SODIUM SERPL-SCNC: 137 MMOL/L (ref 136–145)
TRIGL SERPL-MCNC: 102 MG/DL (ref 0–150)
TSH SERPL DL<=0.005 MIU/L-ACNC: 1.54 UIU/ML (ref 0.27–4.2)
VIT B12 SERPL-MCNC: 701 PG/ML (ref 211–946)
VLDLC SERPL CALC-MCNC: 19 MG/DL (ref 5–40)
WBC # BLD AUTO: 6.34 10*3/MM3 (ref 3.4–10.8)

## 2024-01-29 ENCOUNTER — OFFICE VISIT (OUTPATIENT)
Dept: INTERNAL MEDICINE | Facility: CLINIC | Age: 34
End: 2024-01-29
Payer: MEDICAID

## 2024-01-29 VITALS
BODY MASS INDEX: 31.87 KG/M2 | OXYGEN SATURATION: 97 % | HEART RATE: 114 BPM | DIASTOLIC BLOOD PRESSURE: 80 MMHG | HEIGHT: 69 IN | SYSTOLIC BLOOD PRESSURE: 130 MMHG | WEIGHT: 215.2 LBS | TEMPERATURE: 96.8 F

## 2024-01-29 DIAGNOSIS — E11.65 UNCONTROLLED TYPE 2 DIABETES MELLITUS WITH HYPERGLYCEMIA: Primary | ICD-10-CM

## 2024-01-29 DIAGNOSIS — F41.8 ANXIETY WITH DEPRESSION: ICD-10-CM

## 2024-01-29 DIAGNOSIS — I10 ESSENTIAL HYPERTENSION: ICD-10-CM

## 2024-01-29 DIAGNOSIS — E78.2 MIXED HYPERLIPIDEMIA: ICD-10-CM

## 2024-01-29 PROBLEM — H04.123 DRY EYE SYNDROME OF BOTH EYES: Status: ACTIVE | Noted: 2023-12-05

## 2024-01-29 PROCEDURE — 99214 OFFICE O/P EST MOD 30 MIN: CPT | Performed by: NURSE PRACTITIONER

## 2024-01-29 PROCEDURE — 1159F MED LIST DOCD IN RCRD: CPT | Performed by: NURSE PRACTITIONER

## 2024-01-29 PROCEDURE — 1160F RVW MEDS BY RX/DR IN RCRD: CPT | Performed by: NURSE PRACTITIONER

## 2024-01-29 PROCEDURE — 3075F SYST BP GE 130 - 139MM HG: CPT | Performed by: NURSE PRACTITIONER

## 2024-01-29 PROCEDURE — 3046F HEMOGLOBIN A1C LEVEL >9.0%: CPT | Performed by: NURSE PRACTITIONER

## 2024-01-29 PROCEDURE — 3079F DIAST BP 80-89 MM HG: CPT | Performed by: NURSE PRACTITIONER

## 2024-01-29 RX ORDER — METFORMIN HYDROCHLORIDE 500 MG/1
500 TABLET, EXTENDED RELEASE ORAL 2 TIMES DAILY
Qty: 60 TABLET | Refills: 2 | Status: SHIPPED | OUTPATIENT
Start: 2024-01-29

## 2024-01-29 NOTE — PROGRESS NOTES
Subjective   Bradley Mukherjee is a 33 y.o. male    Chief Complaint   Patient presents with    Depression     Patient in clinic wants to discuss switching depression medication patient states Prozac is not working for him and making him worse      History of Present Illness     DM II - chronic; current regimen is Metformin  mg daily.  Was on Ozempic, but stopped due to SE's.  Current A1C is 9.2%.      HTN - chronic and stable on Metoprolol XL 25 mg daily    HL - chronic and stable on Lipitor 80 mg daily  Lab Results   Component Value Date    CHOL 175 07/03/2020    CHLPL 104 01/25/2024    TRIG 102 01/25/2024    HDL 28 (L) 01/25/2024    LDL 57 01/25/2024     Depression - chronic; worsening.  Was taking Prozac, but felt like it was getting worse so he stopped taking this a few months ago.      COVID - declines  Flu shot - declines  Tdap - declines  Prevnar - declines  Eye exam - 12/5/23  Microalbumin - today      The following portions of the patient's history were reviewed and updated as appropriate: allergies, current medications, past family history, past medical history, past social history, past surgical history, and problem list.    Current Outpatient Medications:     atorvastatin (LIPITOR) 80 MG tablet, Take 1 tablet by mouth every night at bedtime., Disp: 90 tablet, Rfl: 1    cetirizine (zyrTEC) 10 MG tablet, Take 1 tablet by mouth Daily., Disp: 90 tablet, Rfl: 1    glucose blood (FREESTYLE LITE) test strip, CHECK BLOOD SUGAR DAILY AS DIRECTED, E11.9, Disp: 100 each, Rfl: 2    glucose monitor monitoring kit, Use as directed, E11.9, Disp: 1 each, Rfl: 0    Lancet Device misc, Use with glucometer daily as directed, E11.9, Disp: 100 each, Rfl: 1    Lancets (freestyle) lancets, USE WITH METER TO CHECK BLOOD SUGAR DAILY AS DIRECTED, E11.9, Disp: , Rfl:     metFORMIN ER (GLUCOPHAGE-XR) 500 MG 24 hr tablet, Take 1 tablet by mouth 2 (Two) Times a Day., Disp: 60 tablet, Rfl: 2    metoprolol succinate XL  "(TOPROL-XL) 25 MG 24 hr tablet, Take 1 tablet by mouth Daily., Disp: 90 tablet, Rfl: 1    dapagliflozin Propanediol (Farxiga) 10 MG tablet, Take 10 mg by mouth Daily., Disp: 30 tablet, Rfl: 2    sertraline (Zoloft) 50 MG tablet, Take 1 tablet by mouth Daily., Disp: 30 tablet, Rfl: 2     Review of Systems   Constitutional:  Negative for chills, fatigue and fever.   Respiratory:  Negative for cough and chest tightness.    Cardiovascular:  Negative for chest pain.   Gastrointestinal:  Negative for abdominal pain, diarrhea, nausea and vomiting.   Endocrine: Negative for cold intolerance and heat intolerance.   Musculoskeletal:  Negative for arthralgias.   Neurological:  Negative for dizziness.       Objective   Physical Exam  Constitutional:       Appearance: He is well-developed.   HENT:      Head: Normocephalic and atraumatic.   Eyes:      Conjunctiva/sclera: Conjunctivae normal.      Pupils: Pupils are equal, round, and reactive to light.   Cardiovascular:      Rate and Rhythm: Normal rate and regular rhythm.      Heart sounds: Normal heart sounds.   Pulmonary:      Effort: Pulmonary effort is normal.      Breath sounds: Normal breath sounds.   Abdominal:      General: Bowel sounds are normal.      Palpations: Abdomen is soft.   Musculoskeletal:         General: Normal range of motion.      Cervical back: Normal range of motion.   Skin:     General: Skin is warm and dry.   Neurological:      Mental Status: He is alert and oriented to person, place, and time.      Deep Tendon Reflexes: Reflexes are normal and symmetric.   Psychiatric:         Behavior: Behavior normal.         Thought Content: Thought content normal.         Judgment: Judgment normal.       Vitals:    01/29/24 1331   BP: 130/80   Pulse: 114   Temp: 96.8 °F (36 °C)   SpO2: 97%   Weight: 97.6 kg (215 lb 3.2 oz)   Height: 174.8 cm (68.82\")         Assessment & Plan   Diagnoses and all orders for this visit:    1. Uncontrolled type 2 diabetes mellitus " with hyperglycemia (Primary)  -     dapagliflozin Propanediol (Farxiga) 10 MG tablet; Take 10 mg by mouth Daily.  Dispense: 30 tablet; Refill: 2  -     metFORMIN ER (GLUCOPHAGE-XR) 500 MG 24 hr tablet; Take 1 tablet by mouth 2 (Two) Times a Day.  Dispense: 60 tablet; Refill: 2  -     Microalbumin / Creatinine Urine Ratio - Urine, Clean Catch; Future    2. Mixed hyperlipidemia    3. Essential hypertension    4. Anxiety with depression  -     sertraline (Zoloft) 50 MG tablet; Take 1 tablet by mouth Daily.  Dispense: 30 tablet; Refill: 2    Labs reviewed  Metformin increased to 500 BID  Farxiga added  Zoloft 50 mg added  Return in about 4 weeks (around 2/26/2024).

## 2024-01-30 LAB
ALBUMIN/CREAT UR: <18 MG/G CREAT (ref 0–29)
CREAT UR-MCNC: 16.7 MG/DL
MICROALBUMIN UR-MCNC: <3 UG/ML

## 2024-03-05 ENCOUNTER — PRIOR AUTHORIZATION (OUTPATIENT)
Dept: INTERNAL MEDICINE | Facility: CLINIC | Age: 34
End: 2024-03-05
Payer: MEDICAID

## 2024-03-05 RX ORDER — DAPAGLIFLOZIN 10 MG/1
10 TABLET, FILM COATED ORAL DAILY
Qty: 30 TABLET | Refills: 2 | Status: SHIPPED | OUTPATIENT
Start: 2024-03-05

## 2024-03-05 NOTE — TELEPHONE ENCOUNTER
Insurance prefers brand name farxiga.  The pharmacy needs a new script for brand only.  I have sent over a new script for JOHN farxiga.

## 2024-06-11 ENCOUNTER — OFFICE VISIT (OUTPATIENT)
Dept: INTERNAL MEDICINE | Facility: CLINIC | Age: 34
End: 2024-06-11
Payer: MEDICAID

## 2024-06-11 VITALS
HEART RATE: 92 BPM | OXYGEN SATURATION: 98 % | TEMPERATURE: 99.1 F | WEIGHT: 211.8 LBS | BODY MASS INDEX: 31.37 KG/M2 | DIASTOLIC BLOOD PRESSURE: 110 MMHG | SYSTOLIC BLOOD PRESSURE: 164 MMHG | RESPIRATION RATE: 16 BRPM | HEIGHT: 69 IN

## 2024-06-11 DIAGNOSIS — F32.A ANXIETY AND DEPRESSION: Chronic | ICD-10-CM

## 2024-06-11 DIAGNOSIS — E55.9 VITAMIN D DEFICIENCY: ICD-10-CM

## 2024-06-11 DIAGNOSIS — F31.62 BIPOLAR DISORDER, CURRENT EPISODE MIXED, MODERATE: Chronic | ICD-10-CM

## 2024-06-11 DIAGNOSIS — M70.32 BURSITIS OF LEFT ELBOW, UNSPECIFIED BURSA: ICD-10-CM

## 2024-06-11 DIAGNOSIS — E11.65 UNCONTROLLED TYPE 2 DIABETES MELLITUS WITH HYPERGLYCEMIA: Primary | ICD-10-CM

## 2024-06-11 DIAGNOSIS — I10 ESSENTIAL HYPERTENSION: ICD-10-CM

## 2024-06-11 DIAGNOSIS — E78.2 MIXED HYPERLIPIDEMIA: ICD-10-CM

## 2024-06-11 DIAGNOSIS — F41.9 ANXIETY AND DEPRESSION: Chronic | ICD-10-CM

## 2024-06-11 LAB
EXPIRATION DATE: ABNORMAL
HBA1C MFR BLD: 9 % (ref 4.5–5.7)
Lab: ABNORMAL

## 2024-06-11 PROCEDURE — 1126F AMNT PAIN NOTED NONE PRSNT: CPT | Performed by: NURSE PRACTITIONER

## 2024-06-11 PROCEDURE — 83036 HEMOGLOBIN GLYCOSYLATED A1C: CPT | Performed by: NURSE PRACTITIONER

## 2024-06-11 PROCEDURE — 99214 OFFICE O/P EST MOD 30 MIN: CPT | Performed by: NURSE PRACTITIONER

## 2024-06-11 PROCEDURE — 3077F SYST BP >= 140 MM HG: CPT | Performed by: NURSE PRACTITIONER

## 2024-06-11 PROCEDURE — 3052F HG A1C>EQUAL 8.0%<EQUAL 9.0%: CPT | Performed by: NURSE PRACTITIONER

## 2024-06-11 PROCEDURE — 3080F DIAST BP >= 90 MM HG: CPT | Performed by: NURSE PRACTITIONER

## 2024-06-11 RX ORDER — METAPROTERENOL SULFATE 10 MG
500 TABLET ORAL DAILY
COMMUNITY
End: 2024-06-11

## 2024-06-11 RX ORDER — METOPROLOL SUCCINATE 25 MG/1
25 TABLET, EXTENDED RELEASE ORAL NIGHTLY
Qty: 90 TABLET | Refills: 1 | Status: SHIPPED | OUTPATIENT
Start: 2024-06-11

## 2024-06-11 RX ORDER — DAPAGLIFLOZIN 10 MG/1
10 TABLET, FILM COATED ORAL DAILY
Qty: 30 TABLET | Refills: 2 | Status: SHIPPED | OUTPATIENT
Start: 2024-06-11

## 2024-06-11 RX ORDER — ERYTHROMYCIN 5 MG/G
1 OINTMENT OPHTHALMIC
COMMUNITY
End: 2024-06-11

## 2024-06-11 RX ORDER — ATORVASTATIN CALCIUM 80 MG/1
80 TABLET, FILM COATED ORAL
Qty: 90 TABLET | Refills: 1 | Status: SHIPPED | OUTPATIENT
Start: 2024-06-11

## 2024-06-11 RX ORDER — ACETAMINOPHEN 325 MG/1
650 TABLET ORAL EVERY 6 HOURS PRN
COMMUNITY
End: 2024-06-11

## 2024-06-11 RX ORDER — DIPHENOXYLATE HYDROCHLORIDE AND ATROPINE SULFATE 2.5; .025 MG/1; MG/1
1 TABLET ORAL DAILY
COMMUNITY
End: 2024-06-11

## 2024-06-11 RX ORDER — METFORMIN HYDROCHLORIDE 500 MG/1
1000 TABLET, EXTENDED RELEASE ORAL 2 TIMES DAILY
Qty: 360 TABLET | Refills: 1 | Status: SHIPPED | OUTPATIENT
Start: 2024-06-11

## 2024-06-11 NOTE — PROGRESS NOTES
Subjective   Bradley Mukherjee is a 34 y.o. male    Chief Complaint   Patient presents with    Medication Problem     Patient would like to discuss his medication he states a lot of it makes him feel like  he has no energy.      History of Present Illness     Deny was rock climbing about 3 months ago and fell.  Landed on his left elbow.  Now very swollen/squishy.  Has not been seen for this.      DM II - chronic; current regimen is Metformin  mg daily.  Was on Ozempic, but stopped due to SE's.  Last A1C was 9.2%.  Metformin was increased to 1000 mg BID and Farxiga was added.  He has not been taking this for several weeks now.  Today's A1C is 9.0%    HTN - chronic; stopped taking his Metoprolol 1.5 months.      HL - chronic; should be taking Lipitor 80 mg daily        Lab Results   Component Value Date     CHOL 175 07/03/2020     CHLPL 104 01/25/2024     TRIG 102 01/25/2024     HDL 28 (L) 01/25/2024     LDL 57 01/25/2024     Bipolar - chronic; worsening.  Mother in law is present for visit today.  States that these sx's are worsening.  He is up and down and swings are more intense.  Was taking Prozac, but felt like it was getting worse so he stopped taking this a few months ago.  Was changed to Zoloft at last visit.  They both state that he was very, very down on this medication.       COVID - declines  Flu shot - declines  Tdap - declines  Prevnar - declines  Eye exam - 12/5/23  Microalbumin - 1/2024       The following portions of the patient's history were reviewed and updated as appropriate: allergies, current medications, past family history, past medical history, past social history, past surgical history, and problem list.    Current Outpatient Medications:     atorvastatin (LIPITOR) 80 MG tablet, Take 1 tablet by mouth every night at bedtime., Disp: 90 tablet, Rfl: 1    Farxiga 10 MG tablet, Take 10 mg by mouth Daily., Disp: 30 tablet, Rfl: 2    metFORMIN ER (GLUCOPHAGE-XR) 500 MG 24 hr tablet, Take  2 tablets by mouth 2 (Two) Times a Day., Disp: 360 tablet, Rfl: 1    metoprolol succinate XL (TOPROL-XL) 25 MG 24 hr tablet, Take 1 tablet by mouth Every Night., Disp: 90 tablet, Rfl: 1    Cariprazine HCl (Vraylar) 1.5 MG capsule capsule, Take 1 capsule by mouth Daily., Disp: 30 capsule, Rfl: 2    glucose blood (FREESTYLE LITE) test strip, CHECK BLOOD SUGAR DAILY AS DIRECTED, E11.9 (Patient not taking: Reported on 6/11/2024), Disp: 100 each, Rfl: 2    glucose monitor monitoring kit, Use as directed, E11.9 (Patient not taking: Reported on 6/11/2024), Disp: 1 each, Rfl: 0    Lancet Device misc, Use with glucometer daily as directed, E11.9 (Patient not taking: Reported on 6/11/2024), Disp: 100 each, Rfl: 1    Lancets (freestyle) lancets, USE WITH METER TO CHECK BLOOD SUGAR DAILY AS DIRECTED, E11.9 (Patient not taking: Reported on 6/11/2024), Disp: , Rfl:      Review of Systems   Constitutional:  Negative for chills, fatigue and fever.   Respiratory:  Negative for cough, chest tightness and shortness of breath.    Cardiovascular:  Negative for chest pain.   Gastrointestinal:  Negative for abdominal pain, diarrhea, nausea and vomiting.   Endocrine: Negative for cold intolerance and heat intolerance.   Musculoskeletal:  Positive for arthralgias.        Left elbow pain/swelling   Neurological:  Negative for dizziness.   Psychiatric/Behavioral:  Positive for agitation, behavioral problems, decreased concentration and dysphoric mood. The patient is nervous/anxious.        Objective   Physical Exam  Constitutional:       Appearance: He is well-developed.   HENT:      Head: Normocephalic and atraumatic.   Eyes:      Conjunctiva/sclera: Conjunctivae normal.      Pupils: Pupils are equal, round, and reactive to light.   Cardiovascular:      Rate and Rhythm: Normal rate and regular rhythm.      Heart sounds: Normal heart sounds.   Pulmonary:      Effort: Pulmonary effort is normal.      Breath sounds: Normal breath sounds.  "  Abdominal:      General: Bowel sounds are normal.      Palpations: Abdomen is soft.   Musculoskeletal:         General: Normal range of motion.      Right elbow: Normal.      Left elbow: Swelling and effusion present. No deformity or lacerations. Decreased range of motion. No tenderness.      Cervical back: Normal range of motion.   Skin:     General: Skin is warm and dry.   Neurological:      Mental Status: He is alert and oriented to person, place, and time.      Deep Tendon Reflexes: Reflexes are normal and symmetric.   Psychiatric:         Mood and Affect: Mood is anxious and depressed.         Behavior: Behavior normal.         Thought Content: Thought content normal.         Judgment: Judgment normal.       Vitals:    06/11/24 1628   BP: (!) 164/110   Pulse: 92   Resp: 16   Temp: 99.1 °F (37.3 °C)   TempSrc: Temporal   SpO2: 98%   Weight: 96.1 kg (211 lb 12.8 oz)   Height: 174.8 cm (68.82\")         Assessment & Plan   Diagnoses and all orders for this visit:    1. Uncontrolled type 2 diabetes mellitus with hyperglycemia (Primary)  -     POC Glycosylated Hemoglobin (Hb A1C)  -     Farxiga 10 MG tablet; Take 10 mg by mouth Daily.  Dispense: 30 tablet; Refill: 2  -     metFORMIN ER (GLUCOPHAGE-XR) 500 MG 24 hr tablet; Take 2 tablets by mouth 2 (Two) Times a Day.  Dispense: 360 tablet; Refill: 1  -     CBC & Differential; Future  -     Comprehensive Metabolic Panel; Future  -     Lipid Panel; Future  -     TSH; Future    2. Mixed hyperlipidemia  -     atorvastatin (LIPITOR) 80 MG tablet; Take 1 tablet by mouth every night at bedtime.  Dispense: 90 tablet; Refill: 1  -     CBC & Differential; Future  -     Comprehensive Metabolic Panel; Future  -     Lipid Panel; Future  -     TSH; Future    3. Essential hypertension  -     metoprolol succinate XL (TOPROL-XL) 25 MG 24 hr tablet; Take 1 tablet by mouth Every Night.  Dispense: 90 tablet; Refill: 1  -     CBC & Differential; Future  -     Comprehensive Metabolic " Panel; Future  -     Lipid Panel; Future  -     TSH; Future    4. Vitamin D deficiency  -     CBC & Differential; Future  -     Comprehensive Metabolic Panel; Future  -     Lipid Panel; Future  -     TSH; Future    5. Anxiety and depression  -     Cariprazine HCl (Vraylar) 1.5 MG capsule capsule; Take 1 capsule by mouth Daily.  Dispense: 30 capsule; Refill: 2  -     CBC & Differential; Future  -     Comprehensive Metabolic Panel; Future  -     Lipid Panel; Future  -     TSH; Future  -     Ambulatory Referral to Behavioral Health    6. Bipolar disorder, current episode mixed, moderate  -     Cariprazine HCl (Vraylar) 1.5 MG capsule capsule; Take 1 capsule by mouth Daily.  Dispense: 30 capsule; Refill: 2  -     CBC & Differential; Future  -     Comprehensive Metabolic Panel; Future  -     Lipid Panel; Future  -     TSH; Future  -     Ambulatory Referral to Behavioral Health    7. Bursitis of left elbow, unspecified bursa  -     Ambulatory Referral to Orthopedic Surgery      Restart Metformin and Farxiga ASAP  Rstart metoprolol ASAP  Restart Lipitor  Labs sent  Will start Vralar   Referred to   Referred to Ortho for bursitis of elbow  Return in about 2 weeks (around 6/25/2024).

## 2024-06-12 DIAGNOSIS — M25.521 RIGHT ELBOW PAIN: Primary | ICD-10-CM

## 2024-06-13 ENCOUNTER — OFFICE VISIT (OUTPATIENT)
Dept: ORTHOPEDIC SURGERY | Facility: CLINIC | Age: 34
End: 2024-06-13
Payer: MEDICAID

## 2024-06-13 VITALS — TEMPERATURE: 98.4 F | HEIGHT: 69 IN | BODY MASS INDEX: 31.55 KG/M2 | WEIGHT: 213 LBS

## 2024-06-13 DIAGNOSIS — M70.22 OLECRANON BURSITIS OF LEFT ELBOW: Primary | ICD-10-CM

## 2024-06-13 PROCEDURE — 1159F MED LIST DOCD IN RCRD: CPT | Performed by: STUDENT IN AN ORGANIZED HEALTH CARE EDUCATION/TRAINING PROGRAM

## 2024-06-13 PROCEDURE — 1160F RVW MEDS BY RX/DR IN RCRD: CPT | Performed by: STUDENT IN AN ORGANIZED HEALTH CARE EDUCATION/TRAINING PROGRAM

## 2024-06-13 PROCEDURE — 20605 DRAIN/INJ JOINT/BURSA W/O US: CPT | Performed by: STUDENT IN AN ORGANIZED HEALTH CARE EDUCATION/TRAINING PROGRAM

## 2024-06-13 PROCEDURE — 99203 OFFICE O/P NEW LOW 30 MIN: CPT | Performed by: STUDENT IN AN ORGANIZED HEALTH CARE EDUCATION/TRAINING PROGRAM

## 2024-06-13 RX ORDER — METHYLPREDNISOLONE ACETATE 40 MG/ML
20 INJECTION, SUSPENSION INTRA-ARTICULAR; INTRALESIONAL; INTRAMUSCULAR; SOFT TISSUE
Status: COMPLETED | OUTPATIENT
Start: 2024-06-13 | End: 2024-06-13

## 2024-06-13 RX ADMIN — METHYLPREDNISOLONE ACETATE 20 MG: 40 INJECTION, SUSPENSION INTRA-ARTICULAR; INTRALESIONAL; INTRAMUSCULAR; SOFT TISSUE at 11:01

## 2024-06-13 NOTE — PROGRESS NOTES
Office Note     Name: Bradley Mukherjee    : 1990     MRN: [id number]     Chief Complaint  Pain of the Left Elbow (States he was rock climbing in [place] in April, he fell off a boulder and landed on concrete. His elbow is swollen but not painful.)    Subjective     History of Present Illness:  Bradley Mukherjee is a 34 y.o. male presenting today for left olecranon bursitis ongoing for the past couple of months.  He states his symptoms began after rockclimbing in which she fell onto concrete with his left elbow hitting the concrete.  Over the following couple weeks it was painful and significantly swollen but states that the pain is now resolved although the swelling has continued.  He would like this drained in office today.  He denies any pain with elbow movement and he has full range of motion.  He denies any systemic symptoms.  Denies any obvious signs of infection.    Review of Systems   Constitutional:  Negative for fever.   HENT:  Negative for dental problem and voice change.    Eyes:  Negative for visual disturbance.   Respiratory:  Negative for shortness of breath.    Cardiovascular:  Negative for chest pain.   Gastrointestinal:  Negative for abdominal pain.   Genitourinary:  Negative for dysuria.   Musculoskeletal:  Positive for arthralgias (left elbow) and joint swelling (left elbow). Negative for gait problem.   Skin:  Negative for rash.   Neurological:  Negative for speech difficulty.   Hematological:  Does not bruise/bleed easily.   Psychiatric/Behavioral:  Negative for confusion.         Past Medical History:   Diagnosis Date    Anxiety and depression 2020    Bipolar disorder  2020    Diastematomyelia  2020    History of drug abuse 2010    Crack    History of heart attack     Due to cocaine overdose    Hx of tooth extraction 2021    Hypertension 2020    Injury of back     Stroke         Past Surgical History:   Procedure Laterality Date    TEETH EXTRACTION          Family History   Adopted: Yes   Problem Relation Age of Onset    No Known Problems Mother        Social History     Socioeconomic History    Marital status: Single   Tobacco Use    Smoking status: Every Day     Current packs/day: 0.50     Average packs/day: 0.5 packs/day for 15.0 years (7.5 ttl pk-yrs)     Types: Cigarettes    Smokeless tobacco: Never    Tobacco comments:     Tried patches. But they did not help.   Vaping Use    Vaping status: Never Used   Substance and Sexual Activity    Alcohol use: Yes     Comment: VERY SELDOM    Drug use: Yes     Types: Marijuana, Cocaine(coke)     Comment: LAST DRUG USE 2012    Sexual activity: Defer         Current Outpatient Medications:     atorvastatin (LIPITOR) 80 MG tablet, Take 1 tablet by mouth every night at bedtime., Disp: 90 tablet, Rfl: 1    Cariprazine HCl (Vraylar) 1.5 MG capsule capsule, Take 1 capsule by mouth Daily., Disp: 30 capsule, Rfl: 2    Farxiga 10 MG tablet, Take 10 mg by mouth Daily., Disp: 30 tablet, Rfl: 2    glucose blood (FREESTYLE LITE) test strip, CHECK BLOOD SUGAR DAILY AS DIRECTED, E11.9 (Patient not taking: Reported on 6/11/2024), Disp: 100 each, Rfl: 2    glucose monitor monitoring kit, Use as directed, E11.9 (Patient not taking: Reported on 6/11/2024), Disp: 1 each, Rfl: 0    Lancet Device misc, Use with glucometer daily as directed, E11.9 (Patient not taking: Reported on 6/11/2024), Disp: 100 each, Rfl: 1    Lancets (freestyle) lancets, USE WITH METER TO CHECK BLOOD SUGAR DAILY AS DIRECTED, E11.9 (Patient not taking: Reported on 6/11/2024), Disp: , Rfl:     metFORMIN ER (GLUCOPHAGE-XR) 500 MG 24 hr tablet, Take 2 tablets by mouth 2 (Two) Times a Day., Disp: 360 tablet, Rfl: 1    metoprolol succinate XL (TOPROL-XL) 25 MG 24 hr tablet, Take 1 tablet by mouth Every Night., Disp: 90 tablet, Rfl: 1    Allergies   Allergen Reactions    Mushroom Extract Complex Swelling    Other Other (See Comments)     Narcotics.  Pt states no narcotics.  "          Objective   Temp 98.4 °F (36.9 °C)   Ht 174.8 cm (68.82\")   Wt 96.6 kg (213 lb)   BMI 31.62 kg/m²    BMI is >= 30 and <35. (Class 1 Obesity). The following options were offered after discussion;: weight loss educational material (shared in after visit summary)       Physical Exam  Left Elbow Exam     Comments:  There is a moderately swollen olecranon bursa.  There is no erythema.  There are no open wounds, signs of infection or active drainage.  He has full painless range of motion in his elbow.  Carpal pulses 2+, gross sensation intact to light touch.           Extremity DVT signs are negative by clinical screen.     Independent Review of Radiographic Studies:    Three-view plain films left elbow were done in office today for evaluation of pain and swelling, no comparison views available.  There are no acute osseous abnormalities noted.  No significant degenerative changes noted.    Medium Joint Arthrocentesis: L olecranon bursa  Date/Time: 6/13/2024 11:01 AM  Consent given by: patient  Site marked: site marked  Timeout: Immediately prior to procedure a time out was called to verify the correct patient, procedure, equipment, support staff and site/side marked as required   Supporting Documentation  Indications: joint swelling   Procedure Details  Location: elbow - L olecranon bursa  Needle size: 18 G  Approach: anterolateral  Medications administered: 20 mg methylPREDNISolone acetate 40 MG/ML  Aspirate amount: 19 mL  Aspirate: blood-tinged  Patient tolerance: patient tolerated the procedure well with no immediate complications          Assessment and Plan   Diagnoses and all orders for this visit:    1. Olecranon bursitis of left elbow (Primary)       Discussion of orthopedic goals  Orthopedic activities reviewed and patient expressed appreciation  Regular exercise as tolerated  Risk, benefits, and merits of treatment alternatives reviewed with the patient and questions answered  Patient guided on " mobility and conditioning exercises  Ice, heat, and/or modalities as beneficial  Elevate arm for residual swelling  Watch for signs and symptoms of infection  Call or notify for any adverse effect from injection therapy  Reduced physical activity as appropriate and avoid offending activity  Counseling on diet, nutrition, fitness exercise, and weight reduction goals    Recommendations/Plan:  Exercise, medications, injections, other patient advice, and return appointment as noted.  Patient is encouraged to call or return for any issues or concerns.    Patient's left olecranon bursa was drained in office today and an aspirate of approximately 19 mL were obtained.  There were no signs of infection in the aspirate.  The aspirate was blood-tinged and slightly clear.  20 mg of methylprednisone was injected after aspiration.  Patient tolerated the procedure well and neurovascular checks were normal following the procedure.  Advised ice and avoiding laying the elbow on hard surfaces over the next couple of weeks.  Also encouraged the use of an elbow wrap or pad.  Advised patient to follow-up with me as needed.    Return if symptoms worsen or fail to improve.  Patient agreeable to call or return sooner for any concerns.

## 2024-06-19 ENCOUNTER — LAB (OUTPATIENT)
Dept: INTERNAL MEDICINE | Facility: CLINIC | Age: 34
End: 2024-06-19
Payer: MEDICAID

## 2024-06-19 DIAGNOSIS — I10 ESSENTIAL HYPERTENSION: ICD-10-CM

## 2024-06-19 DIAGNOSIS — F32.A ANXIETY AND DEPRESSION: ICD-10-CM

## 2024-06-19 DIAGNOSIS — E78.2 MIXED HYPERLIPIDEMIA: ICD-10-CM

## 2024-06-19 DIAGNOSIS — E55.9 VITAMIN D DEFICIENCY: ICD-10-CM

## 2024-06-19 DIAGNOSIS — F31.62 BIPOLAR DISORDER, CURRENT EPISODE MIXED, MODERATE: ICD-10-CM

## 2024-06-19 DIAGNOSIS — E11.65 UNCONTROLLED TYPE 2 DIABETES MELLITUS WITH HYPERGLYCEMIA: ICD-10-CM

## 2024-06-19 DIAGNOSIS — F41.9 ANXIETY AND DEPRESSION: ICD-10-CM

## 2024-06-19 LAB
ALBUMIN SERPL-MCNC: 4.4 G/DL (ref 3.5–5.2)
ALBUMIN/GLOB SERPL: 1.5 G/DL
ALP SERPL-CCNC: 117 U/L (ref 39–117)
ALT SERPL W P-5'-P-CCNC: 41 U/L (ref 1–41)
ANION GAP SERPL CALCULATED.3IONS-SCNC: 9.5 MMOL/L (ref 5–15)
AST SERPL-CCNC: 20 U/L (ref 1–40)
BASOPHILS # BLD AUTO: 0.01 10*3/MM3 (ref 0–0.2)
BASOPHILS NFR BLD AUTO: 0.2 % (ref 0–1.5)
BILIRUB SERPL-MCNC: 0.3 MG/DL (ref 0–1.2)
BUN SERPL-MCNC: 13 MG/DL (ref 6–20)
BUN/CREAT SERPL: 13.1 (ref 7–25)
CALCIUM SPEC-SCNC: 9.3 MG/DL (ref 8.6–10.5)
CHLORIDE SERPL-SCNC: 101 MMOL/L (ref 98–107)
CHOLEST SERPL-MCNC: 145 MG/DL (ref 0–200)
CO2 SERPL-SCNC: 25.5 MMOL/L (ref 22–29)
CREAT SERPL-MCNC: 0.99 MG/DL (ref 0.76–1.27)
DEPRECATED RDW RBC AUTO: 39.5 FL (ref 37–54)
EGFRCR SERPLBLD CKD-EPI 2021: 102.5 ML/MIN/1.73
EOSINOPHIL # BLD AUTO: 0.27 10*3/MM3 (ref 0–0.4)
EOSINOPHIL NFR BLD AUTO: 4.5 % (ref 0.3–6.2)
ERYTHROCYTE [DISTWIDTH] IN BLOOD BY AUTOMATED COUNT: 12 % (ref 12.3–15.4)
GLOBULIN UR ELPH-MCNC: 2.9 GM/DL
GLUCOSE SERPL-MCNC: 173 MG/DL (ref 65–99)
HCT VFR BLD AUTO: 50.9 % (ref 37.5–51)
HDLC SERPL-MCNC: 35 MG/DL (ref 40–60)
HGB BLD-MCNC: 17.2 G/DL (ref 13–17.7)
IMM GRANULOCYTES # BLD AUTO: 0.01 10*3/MM3 (ref 0–0.05)
IMM GRANULOCYTES NFR BLD AUTO: 0.2 % (ref 0–0.5)
LDLC SERPL CALC-MCNC: 87 MG/DL (ref 0–100)
LDLC/HDLC SERPL: 2.41 {RATIO}
LYMPHOCYTES # BLD AUTO: 2.07 10*3/MM3 (ref 0.7–3.1)
LYMPHOCYTES NFR BLD AUTO: 34.4 % (ref 19.6–45.3)
MCH RBC QN AUTO: 30.6 PG (ref 26.6–33)
MCHC RBC AUTO-ENTMCNC: 33.8 G/DL (ref 31.5–35.7)
MCV RBC AUTO: 90.4 FL (ref 79–97)
MONOCYTES # BLD AUTO: 0.54 10*3/MM3 (ref 0.1–0.9)
MONOCYTES NFR BLD AUTO: 9 % (ref 5–12)
NEUTROPHILS NFR BLD AUTO: 3.12 10*3/MM3 (ref 1.7–7)
NEUTROPHILS NFR BLD AUTO: 51.7 % (ref 42.7–76)
NRBC BLD AUTO-RTO: 0 /100 WBC (ref 0–0.2)
PLATELET # BLD AUTO: 190 10*3/MM3 (ref 140–450)
PMV BLD AUTO: 9.6 FL (ref 6–12)
POTASSIUM SERPL-SCNC: 4.8 MMOL/L (ref 3.5–5.2)
PROT SERPL-MCNC: 7.3 G/DL (ref 6–8.5)
RBC # BLD AUTO: 5.63 10*6/MM3 (ref 4.14–5.8)
SODIUM SERPL-SCNC: 136 MMOL/L (ref 136–145)
TRIGL SERPL-MCNC: 128 MG/DL (ref 0–150)
TSH SERPL DL<=0.05 MIU/L-ACNC: 1.36 UIU/ML (ref 0.27–4.2)
VLDLC SERPL-MCNC: 23 MG/DL (ref 5–40)
WBC NRBC COR # BLD AUTO: 6.02 10*3/MM3 (ref 3.4–10.8)

## 2024-06-19 PROCEDURE — 80053 COMPREHEN METABOLIC PANEL: CPT | Performed by: NURSE PRACTITIONER

## 2024-06-19 PROCEDURE — 85025 COMPLETE CBC W/AUTO DIFF WBC: CPT | Performed by: NURSE PRACTITIONER

## 2024-06-19 PROCEDURE — 80061 LIPID PANEL: CPT | Performed by: NURSE PRACTITIONER

## 2024-06-19 PROCEDURE — 84443 ASSAY THYROID STIM HORMONE: CPT | Performed by: NURSE PRACTITIONER

## 2024-06-19 PROCEDURE — 36415 COLL VENOUS BLD VENIPUNCTURE: CPT | Performed by: NURSE PRACTITIONER

## 2024-06-20 ENCOUNTER — OFFICE VISIT (OUTPATIENT)
Dept: INTERNAL MEDICINE | Facility: CLINIC | Age: 34
End: 2024-06-20
Payer: MEDICAID

## 2024-06-20 VITALS
HEIGHT: 69 IN | BODY MASS INDEX: 31.55 KG/M2 | SYSTOLIC BLOOD PRESSURE: 140 MMHG | WEIGHT: 213 LBS | HEART RATE: 91 BPM | RESPIRATION RATE: 16 BRPM | TEMPERATURE: 97.8 F | OXYGEN SATURATION: 97 % | DIASTOLIC BLOOD PRESSURE: 100 MMHG

## 2024-06-20 DIAGNOSIS — F31.62 BIPOLAR DISORDER, CURRENT EPISODE MIXED, MODERATE: Chronic | ICD-10-CM

## 2024-06-20 DIAGNOSIS — I10 ESSENTIAL HYPERTENSION: ICD-10-CM

## 2024-06-20 DIAGNOSIS — E78.2 MIXED HYPERLIPIDEMIA: ICD-10-CM

## 2024-06-20 DIAGNOSIS — E11.65 UNCONTROLLED TYPE 2 DIABETES MELLITUS WITH HYPERGLYCEMIA: Primary | ICD-10-CM

## 2024-06-20 LAB — GLUCOSE BLDC GLUCOMTR-MCNC: 144 MG/DL (ref 70–130)

## 2024-06-20 PROCEDURE — 1160F RVW MEDS BY RX/DR IN RCRD: CPT | Performed by: NURSE PRACTITIONER

## 2024-06-20 PROCEDURE — 99214 OFFICE O/P EST MOD 30 MIN: CPT | Performed by: NURSE PRACTITIONER

## 2024-06-20 PROCEDURE — 1159F MED LIST DOCD IN RCRD: CPT | Performed by: NURSE PRACTITIONER

## 2024-06-20 PROCEDURE — 3052F HG A1C>EQUAL 8.0%<EQUAL 9.0%: CPT | Performed by: NURSE PRACTITIONER

## 2024-06-20 PROCEDURE — 82948 REAGENT STRIP/BLOOD GLUCOSE: CPT | Performed by: NURSE PRACTITIONER

## 2024-06-20 PROCEDURE — 3080F DIAST BP >= 90 MM HG: CPT | Performed by: NURSE PRACTITIONER

## 2024-06-20 PROCEDURE — 3077F SYST BP >= 140 MM HG: CPT | Performed by: NURSE PRACTITIONER

## 2024-06-20 PROCEDURE — 1126F AMNT PAIN NOTED NONE PRSNT: CPT | Performed by: NURSE PRACTITIONER

## 2024-06-20 RX ORDER — LOSARTAN POTASSIUM 25 MG/1
25 TABLET ORAL DAILY
Qty: 90 TABLET | Refills: 1 | Status: SHIPPED | OUTPATIENT
Start: 2024-06-20

## 2024-06-20 NOTE — PROGRESS NOTES
Subjective   Bradley Mukherjee is a 34 y.o. male    Chief Complaint   Patient presents with    Follow-up     2 week follow up. Patient states he has been taking medications but not taking as much of the metformin he is taking half the dose of the metformin.   Patient went and had his elbow drained, the next day it came back so he is not sure what to do.      History of Present Illness     Deny was rock climbing about 3 months ago and fell.  Landed on his left elbow.  Now very swollen/squishy.  Was referred to Ortho and this was drained on 6/13/24.  Swelling has returned.     DM II - chronic; Metformin  and Farxiga 10 were restarted at his last visit.  He can only tolerate 500 mg BID.  Was on Ozempic, but stopped due to SE's.  Last A1C was 9.0%.      HTN - chronic; stopped taking his Metoprolol 1.5 months, but was restarted at last visit 2 weeks ago     HL - chronic; should be taking Lipitor 80 mg daily  Lab Results   Component Value Date    CHOL 145 06/19/2024    CHLPL 104 01/25/2024    TRIG 128 06/19/2024    HDL 35 (L) 06/19/2024    LDL 87 06/19/2024     Bipolar - chronic; at last visit sx's were worsening.  Reported that he was up and down, and swings were more intense.  Was taking Prozac, but felt like it was getting worse so he stopped taking this a few months ago.  Was changed to Zoloft and he was very, very down on this medication. At last visit, we initiated Vraylar.  He has only been taking for about one week.  He cannot tell a difference just yet, but he has not adverse SE's thusfar      COVID - declines  Flu shot - declines  Tdap - declines  Prevnar - declines  Eye exam - 12/5/23  Microalbumin - 1/2024     The following portions of the patient's history were reviewed and updated as appropriate: allergies, current medications, past family history, past medical history, past social history, past surgical history, and problem list.    Current Outpatient Medications:     atorvastatin (LIPITOR) 80 MG  tablet, Take 1 tablet by mouth every night at bedtime., Disp: 90 tablet, Rfl: 1    Cariprazine HCl (Vraylar) 1.5 MG capsule capsule, Take 1 capsule by mouth Daily., Disp: 30 capsule, Rfl: 2    Farxiga 10 MG tablet, Take 10 mg by mouth Daily., Disp: 30 tablet, Rfl: 2    glucose blood (FREESTYLE LITE) test strip, CHECK BLOOD SUGAR DAILY AS DIRECTED, E11.9 (Patient not taking: Reported on 6/11/2024), Disp: 100 each, Rfl: 2    glucose monitor monitoring kit, Use as directed, E11.9 (Patient not taking: Reported on 6/11/2024), Disp: 1 each, Rfl: 0    Lancet Device misc, Use with glucometer daily as directed, E11.9 (Patient not taking: Reported on 6/11/2024), Disp: 100 each, Rfl: 1    Lancets (freestyle) lancets, USE WITH METER TO CHECK BLOOD SUGAR DAILY AS DIRECTED, E11.9 (Patient not taking: Reported on 6/11/2024), Disp: , Rfl:     losartan (Cozaar) 25 MG tablet, Take 1 tablet by mouth Daily., Disp: 90 tablet, Rfl: 1    metFORMIN ER (GLUCOPHAGE-XR) 500 MG 24 hr tablet, Take 2 tablets by mouth 2 (Two) Times a Day., Disp: 360 tablet, Rfl: 1    metoprolol succinate XL (TOPROL-XL) 25 MG 24 hr tablet, Take 1 tablet by mouth Every Night., Disp: 90 tablet, Rfl: 1     Review of Systems   Constitutional:  Negative for chills, fatigue and fever.   Respiratory:  Negative for cough, chest tightness and shortness of breath.    Cardiovascular:  Negative for chest pain.   Gastrointestinal:  Negative for abdominal pain, diarrhea, nausea and vomiting.   Endocrine: Negative for cold intolerance and heat intolerance.   Musculoskeletal:  Negative for arthralgias.        Left elbow swelling   Neurological:  Negative for dizziness.       Objective   Physical Exam  Constitutional:       Appearance: He is well-developed.   HENT:      Head: Normocephalic and atraumatic.   Eyes:      Conjunctiva/sclera: Conjunctivae normal.      Pupils: Pupils are equal, round, and reactive to light.   Cardiovascular:      Rate and Rhythm: Normal rate and regular  "rhythm.      Heart sounds: Normal heart sounds.   Pulmonary:      Effort: Pulmonary effort is normal.      Breath sounds: Normal breath sounds.   Abdominal:      General: Bowel sounds are normal.      Palpations: Abdomen is soft.   Musculoskeletal:         General: Normal range of motion.      Left elbow: Swelling, deformity and effusion present. No lacerations. Normal range of motion. No tenderness.      Cervical back: Normal range of motion.   Skin:     General: Skin is warm and dry.   Neurological:      Mental Status: He is alert and oriented to person, place, and time.      Deep Tendon Reflexes: Reflexes are normal and symmetric.   Psychiatric:         Behavior: Behavior normal.         Thought Content: Thought content normal.         Judgment: Judgment normal.       Vitals:    06/20/24 1045   BP: 140/100   Pulse: 91   Resp: 16   Temp: 97.8 °F (36.6 °C)   TempSrc: Temporal   SpO2: 97%   Weight: 96.6 kg (213 lb)   Height: 174.8 cm (68.82\")         Assessment & Plan   Diagnoses and all orders for this visit:    1. Uncontrolled type 2 diabetes mellitus with hyperglycemia (Primary)  -     POCT Glucose    2. Essential hypertension  -     losartan (Cozaar) 25 MG tablet; Take 1 tablet by mouth Daily.  Dispense: 90 tablet; Refill: 1    3. Mixed hyperlipidemia    4. Bipolar disorder, current episode mixed, moderate      A1c is improving  Labs reviewed  Losartan added  No other med changes  Return in about 4 weeks (around 7/18/2024) for f/u.             "

## 2024-07-09 ENCOUNTER — OFFICE VISIT (OUTPATIENT)
Dept: BEHAVIORAL HEALTH | Facility: CLINIC | Age: 34
End: 2024-07-09
Payer: MEDICAID

## 2024-07-09 VITALS
BODY MASS INDEX: 30.87 KG/M2 | DIASTOLIC BLOOD PRESSURE: 84 MMHG | SYSTOLIC BLOOD PRESSURE: 126 MMHG | OXYGEN SATURATION: 97 % | HEART RATE: 79 BPM | HEIGHT: 69 IN | WEIGHT: 208.4 LBS

## 2024-07-09 DIAGNOSIS — F31.62 BIPOLAR DISORDER, CURRENT EPISODE MIXED, MODERATE: Primary | Chronic | ICD-10-CM

## 2024-07-09 PROBLEM — F32.A ANXIETY AND DEPRESSION: Chronic | Status: RESOLVED | Noted: 2020-07-02 | Resolved: 2024-07-09

## 2024-07-09 PROBLEM — F41.9 ANXIETY AND DEPRESSION: Chronic | Status: RESOLVED | Noted: 2020-07-02 | Resolved: 2024-07-09

## 2024-07-09 RX ORDER — LUMATEPERONE 42 MG/1
42 CAPSULE ORAL DAILY
Qty: 14 CAPSULE | Refills: 0 | COMMUNITY
Start: 2024-07-09
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
## 2024-07-23 ENCOUNTER — OFFICE VISIT (OUTPATIENT)
Dept: BEHAVIORAL HEALTH | Facility: CLINIC | Age: 34
End: 2024-07-23
Payer: MEDICAID

## 2024-07-23 VITALS
BODY MASS INDEX: 31.37 KG/M2 | HEIGHT: 69 IN | OXYGEN SATURATION: 98 % | SYSTOLIC BLOOD PRESSURE: 126 MMHG | HEART RATE: 91 BPM | DIASTOLIC BLOOD PRESSURE: 82 MMHG | WEIGHT: 211.8 LBS

## 2024-07-23 DIAGNOSIS — F31.62 BIPOLAR DISORDER, CURRENT EPISODE MIXED, MODERATE: Primary | Chronic | ICD-10-CM

## 2024-07-23 PROCEDURE — 1159F MED LIST DOCD IN RCRD: CPT | Performed by: REGISTERED NURSE

## 2024-07-23 PROCEDURE — 99213 OFFICE O/P EST LOW 20 MIN: CPT | Performed by: REGISTERED NURSE

## 2024-07-23 PROCEDURE — 1160F RVW MEDS BY RX/DR IN RCRD: CPT | Performed by: REGISTERED NURSE

## 2024-07-23 PROCEDURE — 3079F DIAST BP 80-89 MM HG: CPT | Performed by: REGISTERED NURSE

## 2024-07-23 PROCEDURE — 3074F SYST BP LT 130 MM HG: CPT | Performed by: REGISTERED NURSE

## 2024-07-23 RX ORDER — LUMATEPERONE 42 MG/1
42 CAPSULE ORAL DAILY
Qty: 30 CAPSULE | Refills: 0 | Status: SHIPPED | OUTPATIENT
Start: 2024-07-23

## 2024-07-23 RX ORDER — LUMATEPERONE 42 MG/1
42 CAPSULE ORAL DAILY
Qty: 7 CAPSULE | Refills: 0 | COMMUNITY
Start: 2024-07-23

## 2024-07-23 NOTE — PROGRESS NOTES
Follow Up Office Visit      Patient Name: Bradley Mukherjee  : 1990   MRN: [id number]     Referring Provider: Chandni Mckeon APRN    Chief Complaint:      ICD-10-CM ICD-9-CM   1. Bipolar disorder, current episode mixed, moderate  F31.62 296.62        History of Present Illness:   Bradley Mukherjee is a 34 y.o. 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 He reports that he actually feels like he has had some peace and quiet in his head and only experienced 1 day of irritability and 1 day of dark thoughts, but these symptoms have been very manageable. He reports that he has thoroughly enjoyed this medication better than any other medication that he has ever tried. He reports that he has been taking it at night. He reports that 1 time he had thoughts of screaming at someone, but did not. He reports that he feels 100% better. He reports that he also remembered that he had been on lithium in the distant past when he was suicidal.  Denies SI/HI/AVH.    Review of Systems:   Review of Systems   Constitutional:  Negative for appetite change and unexpected weight change.   Eyes:  Negative for visual disturbance.   Respiratory:  Negative for chest tightness and shortness of breath.    Cardiovascular:  Negative for chest pain.   Musculoskeletal:  Negative for gait problem.   Skin:  Negative for rash and wound.   Neurological:  Negative for dizziness, tremors, seizures, weakness and light-headedness.   Psychiatric/Behavioral:  Negative for agitation, behavioral problems, confusion, hallucinations, self-injury and suicidal ideas. The patient is not nervous/anxious and is not hyperactive.      Sleep pattern: only overslept 1 day by 15 minutes. Has history of over sleeping, but doing early shift now.  Appetite: always over eats, no change.      PHQ-9  Depression Screening  Little interest or pleasure in doing things? 1-->several days   Feeling down, depressed, or hopeless? 1-->several days   Trouble falling or staying asleep, or sleeping too much? 2-->more than half the days   Feeling tired or having little energy? 1-->several days   Poor appetite or overeating? 3-->nearly every day   Feeling bad about yourself - or that you are a failure or have let yourself or your family down? 0-->not at all   Trouble concentrating on things, such as reading the newspaper or watching television? 0-->not at all   Moving or speaking so slowly that other people could have noticed? Or the opposite - being so fidgety or restless that you have been moving around a lot more than usual? 0-->not at all   Thoughts that you would be better off dead, or of hurting yourself in some way? 0-->not at all   PHQ-9 Total Score 8   If you checked off any problems, how difficult have these problems made it for you to do your work, take care of things at home, or get along with other people? not difficult at all     BERNY-7 Anxiety Screening  Over the last two weeks, how often have you been bothered by the following problems?  Feeling nervous, anxious or on edge: Several days  Not being able to stop or control worrying: Not at all  Worrying too much about different things: More than half the days  Trouble Relaxing: Several days  Being so restless that it is hard to sit still: Not at all  Becoming easily annoyed or irritable: Not at all  BERNY 7 Total Score: 4  If you checked any problems, how difficult have these problems made it for you to do your work, take care of things at home, or get along with other people: Not difficult at all    RISK ASSESSMENT:  Patient denies any thoughts or intent of suicide today. Patient denies any impulsive behavior today.     Medications:     Current Outpatient Medications:     atorvastatin (LIPITOR) 80 MG tablet, Take 1 tablet by mouth every night at bedtime., Disp: 90  "tablet, Rfl: 1    Farxiga 10 MG tablet, Take 10 mg by mouth Daily., Disp: 30 tablet, Rfl: 2    glucose blood (FREESTYLE LITE) test strip, CHECK BLOOD SUGAR DAILY AS DIRECTED, E11.9, Disp: 100 each, Rfl: 2    glucose monitor monitoring kit, Use as directed, E11.9, Disp: 1 each, Rfl: 0    Lancet Device misc, Use with glucometer daily as directed, E11.9, Disp: 100 each, Rfl: 1    Lancets (freestyle) lancets, , Disp: , Rfl:     metFORMIN ER (GLUCOPHAGE-XR) 500 MG 24 hr tablet, Take 2 tablets by mouth 2 (Two) Times a Day., Disp: 360 tablet, Rfl: 1    metoprolol succinate XL (TOPROL-XL) 25 MG 24 hr tablet, Take 1 tablet by mouth Every Night., Disp: 90 tablet, Rfl: 1    losartan (Cozaar) 25 MG tablet, Take 1 tablet by mouth Daily. (Patient not taking: Reported on 7/9/2024), Disp: 90 tablet, Rfl: 1    Lumateperone Tosylate (Caplyta) 42 MG capsule, Take 1 capsule by mouth Daily., Disp: 7 capsule, Rfl: 0    Lumateperone Tosylate (Caplyta) 42 MG capsule, Take 1 capsule by mouth Daily., Disp: 30 capsule, Rfl: 0    Medication Considerations:  YONI reviewed and appropriate.      Allergies:   Allergies   Allergen Reactions    Mushroom Extract Complex Swelling    Other Other (See Comments)     Narcotics.  Pt states no narcotics.         Objective     Physical Exam:  Vital Signs:   Vitals:    07/23/24 1513   BP: 126/82   Pulse: 91   SpO2: 98%   Weight: 96.1 kg (211 lb 12.8 oz)   Height: 174.8 cm (68.82\")     Body mass index is 31.44 kg/m².     Mental Status Exam:   Hygiene:   good  Cooperation:  Cooperative  Eye Contact:  Good  Psychomotor Behavior:  Appropriate  Affect:  Appropriate  Mood: normal  Speech:  Normal  Thought Process:  Goal directed and Linear  Thought Content:  Normal  Suicidal:  None  Homicidal:  None  Hallucinations:  None  Delusion:  None  Memory:  Intact  Orientation:  Person, Place, Time, and Situation  Reliability:  good  Insight:  Good  Judgement:  Good  Impulse Control:  Good  Physical/Medical Issues:   see " problem list      Assessment / Plan      Visit Diagnosis/Orders Placed This Visit:  Diagnoses and all orders for this visit:    1. Bipolar disorder, current episode mixed, moderate (Primary)  -     Lumateperone Tosylate (Caplyta) 42 MG capsule; Take 1 capsule by mouth Daily.  Dispense: 30 capsule; Refill: 0    Other orders  -     Lumateperone Tosylate (Caplyta) 42 MG capsule; Take 1 capsule by mouth Daily.  Dispense: 7 capsule; Refill: 0         Functional Status: No impairment    Prognosis: Good with Ongoing Treatment     Impression/Formulation:  Patient appeared alert and oriented.  Patient is voluntarily requesting to continue outpatient psychiatric treatment at [facility].  Patient is receptive to assistance with maintaining a stable lifestyle.  Patient presents with history of     ICD-10-CM ICD-9-CM   1. Bipolar disorder, current episode mixed, moderate  F31.62 296.62     Reviewed patient's previous provider notes. Reviewed most recent labs. Patient meets DSM V diagnostic criteria for diagnoses. Diagnoses may be updated as more information becomes available.       Treatment Plan:   Continue caplyta 42 mg daily  Consider individual therapy  Follow up in 1 month or sooner if needed  Patient will continue supportive psychotherapy efforts and medications as indicated. Clinic will obtain release of information for current treatment team for continuity of care as needed. Patient will contact this office, call 911 or present to the nearest emergency room should suicidal or homicidal ideations occur.  Discussed medication options and treatment plan of prescribed medication(s) as well as the risks, benefits, and potential side effects. Patient acknowledged and verbally consented to continue with current treatment plan and was educated on the importance of compliance with treatment and follow-up appointments.     Patient instructions:  All risks/benefits and side effects discussed with patient,  including risk for weight gain, increased lipids, hyperprolactemia, EPS symptoms, including restlessness, muscle  stiffness or contraction of head, face, neck, trunk and limbs, and TD (which can be irreversible). Pt verbalizes understanding and consents to treatment with these medications.     Follow Up:   Return in about 1 month (around 8/23/2024) for Med Check.        REGGIE Patel, PMHNP-BC [facility]

## 2024-07-24 ENCOUNTER — PRIOR AUTHORIZATION (OUTPATIENT)
Dept: BEHAVIORAL HEALTH | Facility: CLINIC | Age: 34
End: 2024-07-24
Payer: MEDICAID

## 2024-07-29 ENCOUNTER — OFFICE VISIT (OUTPATIENT)
Dept: INTERNAL MEDICINE | Facility: CLINIC | Age: 34
End: 2024-07-29
Payer: MEDICAID

## 2024-07-29 VITALS
OXYGEN SATURATION: 98 % | HEART RATE: 104 BPM | HEIGHT: 69 IN | RESPIRATION RATE: 16 BRPM | WEIGHT: 210 LBS | DIASTOLIC BLOOD PRESSURE: 104 MMHG | TEMPERATURE: 97.3 F | SYSTOLIC BLOOD PRESSURE: 146 MMHG | BODY MASS INDEX: 31.1 KG/M2

## 2024-07-29 DIAGNOSIS — F31.62 BIPOLAR DISORDER, CURRENT EPISODE MIXED, MODERATE: Chronic | ICD-10-CM

## 2024-07-29 DIAGNOSIS — E78.2 MIXED HYPERLIPIDEMIA: ICD-10-CM

## 2024-07-29 DIAGNOSIS — I10 ESSENTIAL HYPERTENSION: ICD-10-CM

## 2024-07-29 DIAGNOSIS — E11.65 UNCONTROLLED TYPE 2 DIABETES MELLITUS WITH HYPERGLYCEMIA: Primary | ICD-10-CM

## 2024-07-29 LAB — GLUCOSE BLDC GLUCOMTR-MCNC: 385 MG/DL (ref 70–130)

## 2024-07-29 PROCEDURE — 3052F HG A1C>EQUAL 8.0%<EQUAL 9.0%: CPT | Performed by: NURSE PRACTITIONER

## 2024-07-29 PROCEDURE — 1126F AMNT PAIN NOTED NONE PRSNT: CPT | Performed by: NURSE PRACTITIONER

## 2024-07-29 PROCEDURE — 99214 OFFICE O/P EST MOD 30 MIN: CPT | Performed by: NURSE PRACTITIONER

## 2024-07-29 PROCEDURE — 3077F SYST BP >= 140 MM HG: CPT | Performed by: NURSE PRACTITIONER

## 2024-07-29 PROCEDURE — 1160F RVW MEDS BY RX/DR IN RCRD: CPT | Performed by: NURSE PRACTITIONER

## 2024-07-29 PROCEDURE — 3080F DIAST BP >= 90 MM HG: CPT | Performed by: NURSE PRACTITIONER

## 2024-07-29 PROCEDURE — 82948 REAGENT STRIP/BLOOD GLUCOSE: CPT | Performed by: NURSE PRACTITIONER

## 2024-07-29 PROCEDURE — 1159F MED LIST DOCD IN RCRD: CPT | Performed by: NURSE PRACTITIONER

## 2024-07-29 RX ORDER — LISINOPRIL 10 MG/1
10 TABLET ORAL DAILY
Qty: 30 TABLET | Refills: 2 | Status: SHIPPED | OUTPATIENT
Start: 2024-07-29

## 2024-07-29 NOTE — PROGRESS NOTES
Subjective   Bradley Mukherjee is a 34 y.o. male    Chief Complaint   Patient presents with    Follow-up     4 week follow up    Diabetes    Hypertension     Patient is not taking the losartan, due to waking up every morning with tingling sensations.      History of Present Illness     DM II - chronic; Metformin  and Farxiga 10 were restarted at his last visit.  He has been able to increase his Metformin to the full 1000 mg  BID.  Was on Ozempic, but stopped due to SE's.  Last A1C was 9.0%.  BG was 384 in office today.  His fasting is generally in the 160-180 range every AM.  He states that he just ate a bunch of candy before coming into the office.    HTN - chronic; stopped taking his Metoprolol 1.5 months, but was restarted approximately 2 months ago.  Losartan was also added at last visit.  He stopped the losartan.  States that it made him feel tingly.  Sx's stopped after he stopped this med    HL - chronic; should be taking Lipitor 80 mg daily        Lab Results   Component Value Date     CHOL 145 06/19/2024     CHLPL 104 01/25/2024     TRIG 128 06/19/2024     HDL 35 (L) 06/19/2024     LDL 87 06/19/2024      Bipolar - chronic; at last visit sx's were worsening.  Reported that he was up and down, and swings were more intense.  Was taking Prozac, but felt like it was getting worse so he stopped taking this a few months ago.  Was changed to Zoloft and he was very, very down on this medication. At last visit, we initiated Vrayla and referred to .  Vraylar was stopped and he is now on Caplyta.  He states that he is feeling so much better on this med!!!     COVID - declines  Flu shot - declines  Tdap - declines  Prevnar - declines  Eye exam - 12/5/23  Microalbumin - 1/2024       The following portions of the patient's history were reviewed and updated as appropriate: allergies, current medications, past family history, past medical history, past social history, past surgical history, and problem  list.    Current Outpatient Medications:     atorvastatin (LIPITOR) 80 MG tablet, Take 1 tablet by mouth every night at bedtime., Disp: 90 tablet, Rfl: 1    Farxiga 10 MG tablet, Take 10 mg by mouth Daily., Disp: 30 tablet, Rfl: 2    glucose blood (FREESTYLE LITE) test strip, CHECK BLOOD SUGAR DAILY AS DIRECTED, E11.9, Disp: 100 each, Rfl: 2    glucose monitor monitoring kit, Use as directed, E11.9, Disp: 1 each, Rfl: 0    Lancet Device mis, Use with glucometer daily as directed, E11.9, Disp: 100 each, Rfl: 1    Lancets (freestyle) lancets, , Disp: , Rfl:     Lumateperone Tosylate (Caplyta) 42 MG capsule, Take 1 capsule by mouth Daily., Disp: 7 capsule, Rfl: 0    Lumateperone Tosylate (Caplyta) 42 MG capsule, Take 1 capsule by mouth Daily., Disp: 30 capsule, Rfl: 0    metFORMIN ER (GLUCOPHAGE-XR) 500 MG 24 hr tablet, Take 2 tablets by mouth 2 (Two) Times a Day., Disp: 360 tablet, Rfl: 1    metoprolol succinate XL (TOPROL-XL) 25 MG 24 hr tablet, Take 1 tablet by mouth Every Night., Disp: 90 tablet, Rfl: 1    lisinopril (PRINIVIL,ZESTRIL) 10 MG tablet, Take 1 tablet by mouth Daily., Disp: 30 tablet, Rfl: 2     Review of Systems   Constitutional:  Negative for chills, fatigue and fever.   Respiratory:  Negative for cough, chest tightness and shortness of breath.    Cardiovascular:  Negative for chest pain.   Gastrointestinal:  Negative for abdominal pain, diarrhea, nausea and vomiting.   Endocrine: Negative for cold intolerance and heat intolerance.   Musculoskeletal:  Negative for arthralgias.   Neurological:  Negative for dizziness.       Objective   Physical Exam  Constitutional:       Appearance: He is well-developed.   HENT:      Head: Normocephalic and atraumatic.   Eyes:      Conjunctiva/sclera: Conjunctivae normal.      Pupils: Pupils are equal, round, and reactive to light.   Cardiovascular:      Rate and Rhythm: Normal rate and regular rhythm.      Heart sounds: Normal heart sounds.   Pulmonary:      Effort:  "Pulmonary effort is normal.      Breath sounds: Normal breath sounds.   Abdominal:      General: Bowel sounds are normal.      Palpations: Abdomen is soft.   Musculoskeletal:         General: Normal range of motion.      Cervical back: Normal range of motion.   Skin:     General: Skin is warm and dry.   Neurological:      Mental Status: He is alert and oriented to person, place, and time.      Deep Tendon Reflexes: Reflexes are normal and symmetric.   Psychiatric:         Behavior: Behavior normal.         Thought Content: Thought content normal.         Judgment: Judgment normal.       Vitals:    07/29/24 1435   BP: (!) 146/104   BP Location: Right arm   Patient Position: Sitting   Cuff Size: Adult   Pulse: 104   Resp: 16   Temp: 97.3 °F (36.3 °C)   TempSrc: Temporal   SpO2: 98%   Weight: 95.3 kg (210 lb)   Height: 174.8 cm (68.82\")         Assessment & Plan   Diagnoses and all orders for this visit:    1. Uncontrolled type 2 diabetes mellitus with hyperglycemia (Primary)  -     POCT Glucose    2. Essential hypertension  -     lisinopril (PRINIVIL,ZESTRIL) 10 MG tablet; Take 1 tablet by mouth Daily.  Dispense: 30 tablet; Refill: 2    3. Mixed hyperlipidemia    4. Bipolar disorder, current episode mixed, moderate    D/C losartan, add Lisinopril   Counseled on diet and exercise to aid in lowering BG  No other med changes  Return in about 7 weeks (around 9/16/2024) for f/u.             "

## 2024-08-20 ENCOUNTER — OFFICE VISIT (OUTPATIENT)
Dept: BEHAVIORAL HEALTH | Facility: CLINIC | Age: 34
End: 2024-08-20
Payer: MEDICAID

## 2024-08-20 VITALS
HEART RATE: 72 BPM | BODY MASS INDEX: 31.64 KG/M2 | WEIGHT: 213.6 LBS | OXYGEN SATURATION: 99 % | SYSTOLIC BLOOD PRESSURE: 118 MMHG | DIASTOLIC BLOOD PRESSURE: 82 MMHG | HEIGHT: 69 IN

## 2024-08-20 DIAGNOSIS — F31.62 BIPOLAR DISORDER, CURRENT EPISODE MIXED, MODERATE: Primary | Chronic | ICD-10-CM

## 2024-08-20 PROCEDURE — 99214 OFFICE O/P EST MOD 30 MIN: CPT | Performed by: REGISTERED NURSE

## 2024-08-20 PROCEDURE — 3079F DIAST BP 80-89 MM HG: CPT | Performed by: REGISTERED NURSE

## 2024-08-20 PROCEDURE — 1159F MED LIST DOCD IN RCRD: CPT | Performed by: REGISTERED NURSE

## 2024-08-20 PROCEDURE — 1160F RVW MEDS BY RX/DR IN RCRD: CPT | Performed by: REGISTERED NURSE

## 2024-08-20 PROCEDURE — 3074F SYST BP LT 130 MM HG: CPT | Performed by: REGISTERED NURSE

## 2024-08-20 RX ORDER — LUMATEPERONE 42 MG/1
42 CAPSULE ORAL DAILY
Qty: 30 CAPSULE | Refills: 0 | Status: SHIPPED | OUTPATIENT
Start: 2024-08-20

## 2024-08-20 NOTE — PROGRESS NOTES
Follow Up Office Visit      Patient Name: Bradley Mukherjee  : 1990   MRN: [id number]     Referring Provider: Chandni Mckeon APRN    Chief Complaint:      ICD-10-CM ICD-9-CM   1. Bipolar disorder, current episode mixed, moderate  F31.62 296.62        History of Present Illness:   Bradley Mukherjee is a 34 y.o. male who is here today for follow up with his motherand medication management    Subjective      Patient Reports:   History of Present Illness  The patient presents for evaluation of bipolar disorder. He is accompanied by an adult female.    He was prescribed Caplyta, which initially showed positive effects. However, he did not receive a call from the pharmacy to collect it. The accompanying adult female suspects insurance issues might be the cause.    His mood swings are extreme, with no middle ground. He either experiences intense happiness or severe depression, characterized by an inability to get out of bed. His temper flares up quickly, but he is not abusive. His reactions to normal situations are often exaggerated.    His sleep pattern is irregular, often sleeping late into the morning. When manic, he struggles to sleep, only managing a few hours before waking up early. During depressive episodes, he tends to sleep excessively. He has been trying to maintain a regular sleep schedule, aiming to go to bed around 10 or 11 PM.    He reports no side effects from the medication and does not believe it has affected his appetite. He is not experiencing suicidal thoughts, plans to harm himself or others, or hallucinations.    Review of Systems:   Review of Systems   Constitutional:  Negative for appetite change and unexpected weight change.   Eyes:  Negative for visual disturbance.   Respiratory:  Negative for chest tightness and shortness of breath.    Cardiovascular:  Negative for chest pain.   Musculoskeletal:  Negative for gait problem.   Skin:  Negative for rash and wound.    Neurological:  Negative for dizziness, tremors, seizures, weakness and light-headedness.   Psychiatric/Behavioral:  Negative for agitation, behavioral problems, confusion, hallucinations, self-injury and suicidal ideas. The patient is not nervous/anxious and is not hyperactive.      Sleep pattern: sleeping more  Appetite: no changes     PHQ-9 Depression Screening  Little interest or pleasure in doing things? 1-->several days   Feeling down, depressed, or hopeless? 1-->several days   Trouble falling or staying asleep, or sleeping too much? 0-->not at all   Feeling tired or having little energy? 1-->several days   Poor appetite or overeating? 1-->several days   Feeling bad about yourself - or that you are a failure or have let yourself or your family down? 2-->more than half the days   Trouble concentrating on things, such as reading the newspaper or watching television? 2-->more than half the days   Moving or speaking so slowly that other people could have noticed? Or the opposite - being so fidgety or restless that you have been moving around a lot more than usual? 0-->not at all   Thoughts that you would be better off dead, or of hurting yourself in some way? 1-->several days   PHQ-9 Total Score 9   If you checked off any problems, how difficult have these problems made it for you to do your work, take care of things at home, or get along with other people? somewhat difficult     BERNY-7 Anxiety Screening  Over the last two weeks, how often have you been bothered by the following problems?  Feeling nervous, anxious or on edge: Several days  Not being able to stop or control worrying: Several days  Worrying too much about different things: Not at all  Trouble Relaxing: Several days  Being so restless that it is hard to sit still: Not at all  Becoming easily annoyed or irritable: Several days  Feeling afraid as if something awful might happen: Several days  BERNY 7 Total Score: 5    RISK ASSESSMENT:  Patient denies any  "thoughts or intent of suicide today. Patient denies any impulsive behavior today.     Medications:     Current Outpatient Medications:     atorvastatin (LIPITOR) 80 MG tablet, Take 1 tablet by mouth every night at bedtime., Disp: 90 tablet, Rfl: 1    Farxiga 10 MG tablet, Take 10 mg by mouth Daily., Disp: 30 tablet, Rfl: 2    glucose blood (FREESTYLE LITE) test strip, CHECK BLOOD SUGAR DAILY AS DIRECTED, E11.9, Disp: 100 each, Rfl: 2    glucose monitor monitoring kit, Use as directed, E11.9, Disp: 1 each, Rfl: 0    lisinopril (PRINIVIL,ZESTRIL) 10 MG tablet, Take 1 tablet by mouth Daily., Disp: 30 tablet, Rfl: 2    Lumateperone Tosylate (Caplyta) 42 MG capsule, Take 1 capsule by mouth Daily., Disp: 30 capsule, Rfl: 0    metFORMIN ER (GLUCOPHAGE-XR) 500 MG 24 hr tablet, Take 2 tablets by mouth 2 (Two) Times a Day., Disp: 360 tablet, Rfl: 1    metoprolol succinate XL (TOPROL-XL) 25 MG 24 hr tablet, Take 1 tablet by mouth Every Night., Disp: 90 tablet, Rfl: 1    Lancet Device misc, Use with glucometer daily as directed, E11.9, Disp: 100 each, Rfl: 1    Lancets (freestyle) lancets, , Disp: , Rfl:     Medication Considerations:  YONI reviewed and appropriate.      Allergies:   Allergies   Allergen Reactions    Mushroom Extract Complex Swelling    Other Other (See Comments)     Narcotics.  Pt states no narcotics.       Results Reviewed:   Results      Objective     Physical Exam:  Vital Signs:   Vitals:    08/20/24 0816   BP: 118/82   Pulse: 72   SpO2: 99%   Weight: 96.9 kg (213 lb 9.6 oz)   Height: 174.8 cm (68.82\")     Body mass index is 31.71 kg/m².     Mental Status Exam:   Hygiene:   good   Cooperation:  Cooperative  Eye Contact:  Fair  Psychomotor Behavior:  Appropriate  Affect:  Appropriate  Mood: normal  Speech:  Normal  Thought Process:  Goal directed and Linear  Thought Content:  Normal  Suicidal:  None  Homicidal:  None  Hallucinations:  None  Delusion:  None  Memory:  Intact  Orientation:  Person, Place, " Time, and Situation  Reliability:  good  Insight:  Good  Judgement:  Good  Impulse Control:  Good  Physical/Medical Issues:   see problem list      Assessment / Plan      Visit Diagnosis/Orders Placed This Visit:  Diagnoses and all orders for this visit:    1. Bipolar disorder, current episode mixed, moderate (Primary)  -     Lumateperone Tosylate (Caplyta) 42 MG capsule; Take 1 capsule by mouth Daily.  Dispense: 30 capsule; Refill: 0             Functional Status: Mild impairment     Prognosis: Good with Ongoing Treatment     Impression/Formulation:  Patient appeared alert and oriented.  Patient is voluntarily requesting to continue outpatient psychiatric treatment at [facility].  Patient is receptive to assistance with maintaining a stable lifestyle.  Patient presents with history of     ICD-10-CM ICD-9-CM   1. Bipolar disorder, current episode mixed, moderate  F31.62 296.62     Reviewed patient's previous provider notes. Reviewed most recent labs. Patient meets DSM V diagnostic criteria for diagnoses. Diagnoses may be updated as more information becomes available.     Assessment & Plan  1. Bipolar Disorder.  The patient's symptoms, including rapid mood swings, disrupted sleep patterns, and periods of jose and depression, are indicative of bipolar disorder. He has previously responded well to Caplyta, which has significantly stabilized his mood without causing severe side effects. A prescription for Caplyta has been sent to the pharmacy, and samples will be provided to ensure continuity of treatment. Discount cards will also be given to assist with any potential insurance issues. He is advised to contact the office immediately if there are any issues at the pharmacy.      Treatment Plan:   Resume caplyta 42 mg daily    Patient will continue supportive psychotherapy efforts and medications as indicated. Clinic will obtain release of information for current treatment team for continuity of  care as needed. Patient will contact this office, call 911 or present to the nearest emergency room should suicidal or homicidal ideations occur.  Discussed medication options and treatment plan of prescribed medication(s) as well as the risks, benefits, and potential side effects. Patient acknowledged and verbally consented to continue with current treatment plan and was educated on the importance of compliance with treatment and follow-up appointments.     Patient instructions:  All risks/benefits and side effects discussed with patient, including risk for weight gain, increased lipids, hyperprolactemia, EPS symptoms, including restlessness, muscle  stiffness or contraction of head, face, neck, trunk and limbs, and TD (which can be irreversible). Pt verbalizes understanding and consents to treatment with these medications.     Follow Up:   Return in about 1 month (around 9/20/2024) for Med Check.    Patient or patient representative verbalized consent for the use of Ambient Listening during the visit with  REGGIE Patel for chart documentation. 8/20/2024  12:14 EDT    REGGIE Patel, [facility]

## 2024-09-11 ENCOUNTER — TELEPHONE (OUTPATIENT)
Dept: BEHAVIORAL HEALTH | Facility: CLINIC | Age: 34
End: 2024-09-11
Payer: MEDICAID

## 2024-09-11 NOTE — TELEPHONE ENCOUNTER
PT called in stating every time he went to the pharmacy to retrieve his caplyta  he was having trouble being told medication needed a prior Auth. Called and advised [facility] prior auth was approved back in August, representative stated he needed to change date and re-ran medication, pt will now be able to get RX    IF PT RETURNS CALL ABOVE MESSAGE CAN BE RELAYED

## 2025-01-01 ENCOUNTER — HOSPITAL ENCOUNTER (EMERGENCY)
Facility: HOSPITAL | Age: 35
Discharge: HOME | End: 2025-01-01
Attending: STUDENT IN AN ORGANIZED HEALTH CARE EDUCATION/TRAINING PROGRAM | Admitting: STUDENT IN AN ORGANIZED HEALTH CARE EDUCATION/TRAINING PROGRAM
Payer: MEDICAID

## 2025-01-01 VITALS
RESPIRATION RATE: 14 BRPM | TEMPERATURE: 97.5 F | SYSTOLIC BLOOD PRESSURE: 147 MMHG | WEIGHT: 205 LBS | DIASTOLIC BLOOD PRESSURE: 96 MMHG | HEIGHT: 69 IN | HEART RATE: 94 BPM | BODY MASS INDEX: 30.36 KG/M2 | OXYGEN SATURATION: 96 %

## 2025-01-01 DIAGNOSIS — L05.91 INFECTED PILONIDAL CYST: Primary | ICD-10-CM

## 2025-01-01 PROCEDURE — 25010000002 LIDOCAINE 1% - EPINEPHRINE 1:100000 1 %-1:100000 SOLUTION: Performed by: NURSE PRACTITIONER

## 2025-01-01 PROCEDURE — 87070 CULTURE OTHR SPECIMN AEROBIC: CPT | Performed by: NURSE PRACTITIONER

## 2025-01-01 PROCEDURE — 99283 EMERGENCY DEPT VISIT LOW MDM: CPT | Performed by: STUDENT IN AN ORGANIZED HEALTH CARE EDUCATION/TRAINING PROGRAM

## 2025-01-01 PROCEDURE — 87205 SMEAR GRAM STAIN: CPT | Performed by: NURSE PRACTITIONER

## 2025-01-01 RX ORDER — LIDOCAINE HYDROCHLORIDE AND EPINEPHRINE 10; 10 MG/ML; UG/ML
10 INJECTION, SOLUTION INFILTRATION; PERINEURAL ONCE
Status: COMPLETED | OUTPATIENT
Start: 2025-01-01 | End: 2025-01-01

## 2025-01-01 RX ORDER — CLINDAMYCIN HYDROCHLORIDE 300 MG/1
300 CAPSULE ORAL 3 TIMES DAILY
Qty: 21 CAPSULE | Refills: 0 | Status: SHIPPED | OUTPATIENT
Start: 2025-01-01 | End: 2025-01-08

## 2025-01-01 RX ADMIN — LIDOCAINE HYDROCHLORIDE,EPINEPHRINE BITARTRATE 10 ML: 10; .01 INJECTION, SOLUTION INFILTRATION; PERINEURAL at 17:29

## 2025-01-02 NOTE — ED PROVIDER NOTES
Subjective:  History of Present Illness:    Patient is a 34-year-old male without contributing health history.  Presents to the ER today for lump to his coccyx region.  Patient reports that he has history of pilonidal cyst.  Has had to have it lanced several times.  He denies fever or drainage.  Denies OTC medication or home remedy.  Denies alleviating or exacerbating factors.    Nurses Notes reviewed and agree, including vitals, allergies, social history and prior medical history.     REVIEW OF SYSTEMS: All systems reviewed and not pertinent unless noted.  Review of Systems   Skin:  Positive for wound.   All other systems reviewed and are negative.      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    History of drug abuse 2010    Crack    History of heart attack     Due to cocaine overdose    Hx of tooth extraction 01/06/2021    Hypertension 07/02/2020    Injury of back     Stroke        Allergies:    Mushroom extract complex (do not select) and Other      Past Surgical History:   Procedure Laterality Date    TEETH EXTRACTION           Social History     Socioeconomic History    Marital status: Single   Tobacco Use    Smoking status: Every Day     Current packs/day: 0.50     Average packs/day: 0.5 packs/day for 15.0 years (7.5 ttl pk-yrs)     Types: Cigarettes    Smokeless tobacco: Never    Tobacco comments:     Tried patches. But they did not help.   Vaping Use    Vaping status: Never Used   Substance and Sexual Activity    Alcohol use: Yes     Comment: VERY SELDOM    Drug use: Yes     Types: Marijuana, Cocaine(coke)     Comment: LAST DRUG USE 2012    Sexual activity: Defer         Family History   Adopted: Yes   Problem Relation Age of Onset    No Known Problems Mother        Objective  Physical Exam:  /96 (BP Location: Left arm, Patient Position: Sitting)   Pulse 94   Temp 97.5 °F (36.4 °C) (Oral)   Resp 14    "Ht 175.3 cm (69\")   Wt 93 kg (205 lb)   SpO2 96%   BMI 30.27 kg/m²      Physical Exam  Vitals and nursing note reviewed.   Constitutional:       Appearance: Normal appearance. He is normal weight.   HENT:      Head: Normocephalic and atraumatic.      Nose: Nose normal.      Mouth/Throat:      Mouth: Mucous membranes are moist.      Pharynx: Oropharynx is clear.   Eyes:      Extraocular Movements: Extraocular movements intact.      Conjunctiva/sclera: Conjunctivae normal.      Pupils: Pupils are equal, round, and reactive to light.   Cardiovascular:      Rate and Rhythm: Normal rate.      Pulses: Normal pulses.   Pulmonary:      Effort: Pulmonary effort is normal.   Abdominal:      General: Abdomen is flat. Bowel sounds are normal.   Musculoskeletal:         General: Normal range of motion.      Cervical back: Normal range of motion and neck supple.   Skin:     General: Skin is warm.      Capillary Refill: Capillary refill takes less than 2 seconds.      Comments: There is small lump noted to upper buttocks consistent with infected pilonidal cyst.   Neurological:      General: No focal deficit present.      Mental Status: He is alert and oriented to person, place, and time. Mental status is at baseline.   Psychiatric:         Mood and Affect: Mood normal.         Behavior: Behavior normal.         Thought Content: Thought content normal.         Judgment: Judgment normal.         Incision & Drainage    Date/Time: 1/1/2025 9:11 PM    Performed by: Socrates Hanna APRN  Authorized by: August Contreras DO    Consent:     Consent obtained:  Verbal    Consent given by:  Patient    Risks, benefits, and alternatives were discussed: yes      Risks discussed:  Bleeding, incomplete drainage, pain, infection and damage to other organs    Alternatives discussed:  No treatment, delayed treatment, alternative treatment, observation and referral  Universal protocol:     Procedure explained and questions answered to patient or " proxy's satisfaction: yes      Relevant documents present and verified: yes      Test results available : no      Imaging studies available: no      Required blood products, implants, devices, and special equipment available: no      Site/side marked: yes      Immediately prior to procedure, a time out was called: yes      Patient identity confirmed:  Verbally with patient, hospital-assigned identification number and arm band  Location:     Type:  Pilonidal cyst    Size:  Quarter size  Pre-procedure details:     Skin preparation:  Antiseptic wash  Sedation:     Sedation type:  None  Procedure type:     Complexity:  Simple  Procedure details:     Ultrasound guidance: no      Needle aspiration: no      Incision types:  Stab incision    Incision depth:  Dermal    Wound management:  Probed and deloculated    Drainage:  Purulent    Drainage amount:  Scant    Wound treatment:  Wound left open    Packing materials:  1/4 in iodoform gauze  Post-procedure details:     Procedure completion:  Tolerated      ED Course:         Lab Results (last 24 hours)       Procedure Component Value Units Date/Time    Wound Culture - Wound, Buttock [[id number]] Collected: 01/01/25 1818    Specimen: Wound from Buttock Updated: 01/01/25 1839     Gram Stain Moderate (3+) WBCs seen      No organisms seen             No radiology results from the last 24 hrs       MDM      Initial impression of presenting illness: Patient is a 34-year-old male without contributing health history.  Presents to the ER today for lump to his coccyx region.  Patient reports that he has history of pilonidal cyst.  Has had to have it lanced several times.  He denies fever or drainage.  Denies OTC medication or home remedy.  Denies alleviating or exacerbating factors.    DDX: includes but is not limited to: Pilonidal cyst, abscess, cellulitis or other    Patient arrives stable with vitals interpreted by myself.     Pertinent features from physical exam: There is a small  lump noted to the upper buttock region consistent with infected pilonidal cyst.  It is confluent.  There is no drainage noted.  Otherwise benign assessment    Initial diagnostic plan: Wound culture    Results from initial plan were reviewed and interpreted by me revealing wound cultures pending    Diagnostic information from other sources: Chart review    Interventions / Re-evaluation: Vital signs stable throughout encounter.  Please see I&D note for further detail.    Results/clinical rationale were discussed with patient    Consultations/Discussion of results with other physicians: N/A    Disposition plan: Patient is hemodynamically stable nontoxic-appearing appropriate discharge.  Will send patient home on clindamycin.  Have him follow-up with PCP within the week.  Follow-up ER for new or worsening symptoms.  -----        Final diagnoses:   Infected pilonidal cyst          Socrates Hanna, APRN  01/01/25 3010

## 2025-01-05 LAB
BACTERIA SPEC AEROBE CULT: NORMAL
GRAM STN SPEC: NORMAL
GRAM STN SPEC: NORMAL

## 2025-02-04 ENCOUNTER — OFFICE VISIT (OUTPATIENT)
Dept: INTERNAL MEDICINE | Facility: CLINIC | Age: 35
End: 2025-02-04
Payer: MEDICAID

## 2025-02-04 VITALS
OXYGEN SATURATION: 97 % | RESPIRATION RATE: 22 BRPM | HEART RATE: 96 BPM | SYSTOLIC BLOOD PRESSURE: 124 MMHG | WEIGHT: 205 LBS | DIASTOLIC BLOOD PRESSURE: 82 MMHG | HEIGHT: 69 IN | TEMPERATURE: 97.5 F | BODY MASS INDEX: 30.36 KG/M2

## 2025-02-04 DIAGNOSIS — R05.1 ACUTE COUGH: Primary | ICD-10-CM

## 2025-02-04 DIAGNOSIS — J40 BRONCHITIS: ICD-10-CM

## 2025-02-04 DIAGNOSIS — R52 GENERALIZED BODY ACHES: ICD-10-CM

## 2025-02-04 LAB
EXPIRATION DATE: NORMAL
FLUAV RNA RESP QL NAA+PROBE: NOT DETECTED
FLUBV RNA RESP QL NAA+PROBE: NOT DETECTED
INTERNAL CONTROL: NORMAL
Lab: NORMAL
SARS-COV-2 RNA RESP QL NAA+PROBE: NOT DETECTED

## 2025-02-04 PROCEDURE — 3079F DIAST BP 80-89 MM HG: CPT

## 2025-02-04 PROCEDURE — 87636 SARSCOV2 & INF A&B AMP PRB: CPT

## 2025-02-04 PROCEDURE — 99214 OFFICE O/P EST MOD 30 MIN: CPT

## 2025-02-04 PROCEDURE — 1159F MED LIST DOCD IN RCRD: CPT

## 2025-02-04 PROCEDURE — 1160F RVW MEDS BY RX/DR IN RCRD: CPT

## 2025-02-04 PROCEDURE — 1126F AMNT PAIN NOTED NONE PRSNT: CPT

## 2025-02-04 PROCEDURE — 3074F SYST BP LT 130 MM HG: CPT

## 2025-02-04 RX ORDER — GUAIFENESIN AND DEXTROMETHORPHAN HYDROBROMIDE 600; 30 MG/1; MG/1
1 TABLET, EXTENDED RELEASE ORAL 2 TIMES DAILY PRN
Qty: 20 TABLET | Refills: 0 | Status: SHIPPED | OUTPATIENT
Start: 2025-02-04

## 2025-02-04 RX ORDER — OSELTAMIVIR PHOSPHATE 75 MG/1
75 CAPSULE ORAL 2 TIMES DAILY
Qty: 10 CAPSULE | Refills: 0 | Status: SHIPPED | OUTPATIENT
Start: 2025-02-04 | End: 2025-02-09

## 2025-02-04 RX ORDER — AZITHROMYCIN 250 MG/1
TABLET, FILM COATED ORAL
Qty: 6 TABLET | Refills: 0 | Status: SHIPPED | OUTPATIENT
Start: 2025-02-04

## 2025-02-04 NOTE — PROGRESS NOTES
"    Office Note     Name: Bradley Mukherjee    : 1990     MRN: [id number]     Chief Complaint  Generalized Body Aches (Woke up this morning with these symptoms /), Fever (100 this morning /Took dayquil and advil about 12 ), Nasal Congestion, Sore Throat, Fatigue, and Cough    Subjective     History of Present Illness:  History of Present Illness      Bradley Mukherjee is a 34 y.o. male who presents today for     Sore throat, nasal congestion, body aches, fatigue, non productive cough that started this morning. States this morning he developed a low grade fever of 100. Denies shortness of breath or chest tightness. States \"feels like I've been hit by a truck\". Some of his co-workers have tested positive for the flu recently.      Past Medical History:   Diagnosis Date    Anxiety and depression 2020    Bipolar disorder  2020    Diastematomyelia  2020    History of drug abuse     Crack    History of heart attack     Due to cocaine overdose    Hx of tooth extraction 2021    Hypertension 2020    Injury of back     Stroke      Past Surgical History:   Procedure Laterality Date    TEETH EXTRACTION         Current Outpatient Medications:     atorvastatin (LIPITOR) 80 MG tablet, Take 1 tablet by mouth every night at bedtime., Disp: 90 tablet, Rfl: 1    Farxiga 10 MG tablet, Take 10 mg by mouth Daily., Disp: 30 tablet, Rfl: 2    glucose blood (FREESTYLE LITE) test strip, CHECK BLOOD SUGAR DAILY AS DIRECTED, E11.9, Disp: 100 each, Rfl: 2    glucose monitor monitoring kit, Use as directed, E11.9, Disp: 1 each, Rfl: 0    Lancet Device misc, Use with glucometer daily as directed, E11.9, Disp: 100 each, Rfl: 1    Lancets (freestyle) lancets, , Disp: , Rfl:     lisinopril (PRINIVIL,ZESTRIL) 10 MG tablet, Take 1 tablet by mouth Daily., Disp: 30 tablet, Rfl: 2    Lumateperone Tosylate (Caplyta) 42 MG capsule, Take 1 capsule by mouth Daily., Disp: 30 capsule, Rfl: 0    metFORMIN ER " "(GLUCOPHAGE-XR) 500 MG 24 hr tablet, Take 2 tablets by mouth 2 (Two) Times a Day., Disp: 360 tablet, Rfl: 1    metoprolol succinate XL (TOPROL-XL) 25 MG 24 hr tablet, Take 1 tablet by mouth Every Night., Disp: 90 tablet, Rfl: 1    azithromycin (ZITHROMAX) 250 MG tablet, Take 2 tablets the first day, then 1 tablet daily for 4 days., Disp: 6 tablet, Rfl: 0    guaifenesin-dextromethorphan 600-30 mg (MUCINEX DM)  MG tablet sustained-release 12 hour, Take 1 tablet by mouth 2 (Two) Times a Day As Needed (cough/congestion)., Disp: 20 tablet, Rfl: 0    oseltamivir (Tamiflu) 75 MG capsule, Take 1 capsule by mouth 2 (Two) Times a Day for 5 days., Disp: 10 capsule, Rfl: 0  Allergies   Allergen Reactions    Mushroom Extract Complex (Do Not Select) Swelling    Other Other (See Comments)     Narcotics.  Pt states no narcotics.       Objective     Vital Signs  /82 (BP Location: Left arm, Patient Position: Sitting, Cuff Size: Adult)   Pulse 96   Temp 97.5 °F (36.4 °C) (Infrared)   Resp 22   Ht 175.3 cm (69.02\")   Wt 93 kg (205 lb)   SpO2 97%   BMI 30.26 kg/m²   Estimated body mass index is 30.26 kg/m² as calculated from the following:    Height as of this encounter: 175.3 cm (69.02\").    Weight as of this encounter: 93 kg (205 lb).            Physical Exam    Physical Exam  Constitutional:       Appearance: He is ill-appearing.   HENT:      Mouth/Throat:      Pharynx: Posterior oropharyngeal erythema and postnasal drip present.      Tonsils: No tonsillar exudate or tonsillar abscesses. 1+ on the right. 1+ on the left.   Eyes:      Extraocular Movements: Extraocular movements intact.      Conjunctiva/sclera: Conjunctivae normal.      Pupils: Pupils are equal, round, and reactive to light.   Cardiovascular:      Rate and Rhythm: Normal rate and regular rhythm.      Pulses: Normal pulses.      Heart sounds: Normal heart sounds. No murmur heard.  Pulmonary:      Effort: No tachypnea.      Breath sounds: Examination " of the right-upper field reveals wheezing and rhonchi. Examination of the right-middle field reveals wheezing and rhonchi. Wheezing and rhonchi present. No decreased breath sounds.   Skin:     Coloration: Skin is not pale.   Neurological:      General: No focal deficit present.      Mental Status: He is alert and oriented to person, place, and time. Mental status is at baseline.   Psychiatric:         Mood and Affect: Mood normal.         Behavior: Behavior normal.         Thought Content: Thought content normal.         Judgment: Judgment normal.          Results      Results for orders placed or performed in visit on 02/04/25   Covid-19 + Flu A&B AG, Veritor    Collection Time: 02/04/25  4:27 PM    Specimen: Nasal Cavity; Swab   Result Value Ref Range    COVID19 Not Detected Not Detected - Ref. Range    POC Influenza A, Molecular Not Detected Negative / Not Detected    POC Influenza B, Molecular Not Detected Negative / Not Detected    Internal Control Passed Passed    Lot Number [id number]     Expiration Date 2/13/26               Assessment and Plan     Diagnoses and all orders for this visit:    1. Acute cough (Primary)  -     Covid-19 + Flu A&B AG, Veritor  -     oseltamivir (Tamiflu) 75 MG capsule; Take 1 capsule by mouth 2 (Two) Times a Day for 5 days.  Dispense: 10 capsule; Refill: 0  -     guaifenesin-dextromethorphan 600-30 mg (MUCINEX DM)  MG tablet sustained-release 12 hour; Take 1 tablet by mouth 2 (Two) Times a Day As Needed (cough/congestion).  Dispense: 20 tablet; Refill: 0  -     azithromycin (ZITHROMAX) 250 MG tablet; Take 2 tablets the first day, then 1 tablet daily for 4 days.  Dispense: 6 tablet; Refill: 0    2. Generalized body aches  -     oseltamivir (Tamiflu) 75 MG capsule; Take 1 capsule by mouth 2 (Two) Times a Day for 5 days.  Dispense: 10 capsule; Refill: 0  -     guaifenesin-dextromethorphan 600-30 mg (MUCINEX DM)  MG tablet sustained-release 12 hour; Take 1 tablet by mouth 2  (Two) Times a Day As Needed (cough/congestion).  Dispense: 20 tablet; Refill: 0    3. Bronchitis  -     azithromycin (ZITHROMAX) 250 MG tablet; Take 2 tablets the first day, then 1 tablet daily for 4 days.  Dispense: 6 tablet; Refill: 0    POC flu/covid negative. Will go ahead and send tamiflu to pharmacy since symptoms just started last night/this morning and rapid POC results may reveal false negative.   Zpak as directed  Tylenol/ibuprofen for fever/body aches   RTC if symptoms do not improve with current treatment   Assessment & Plan      If labs or images are ordered we will contact you with the results within the next week.  If you have not heard from us after a week please call our office to inquire about the results.    Follow Up  Return if symptoms worsen or fail to improve.    Gita Burgos, APRN

## 2025-02-05 ENCOUNTER — OFFICE VISIT (OUTPATIENT)
Dept: INTERNAL MEDICINE | Facility: CLINIC | Age: 35
End: 2025-02-05
Payer: MEDICAID

## 2025-02-05 VITALS
HEART RATE: 92 BPM | BODY MASS INDEX: 31.81 KG/M2 | DIASTOLIC BLOOD PRESSURE: 90 MMHG | OXYGEN SATURATION: 98 % | WEIGHT: 214.8 LBS | HEIGHT: 69 IN | SYSTOLIC BLOOD PRESSURE: 130 MMHG | TEMPERATURE: 96.8 F

## 2025-02-05 DIAGNOSIS — B07.9 VIRAL WARTS, UNSPECIFIED TYPE: Primary | ICD-10-CM

## 2025-02-05 PROCEDURE — 1160F RVW MEDS BY RX/DR IN RCRD: CPT | Performed by: STUDENT IN AN ORGANIZED HEALTH CARE EDUCATION/TRAINING PROGRAM

## 2025-02-05 PROCEDURE — 3075F SYST BP GE 130 - 139MM HG: CPT | Performed by: STUDENT IN AN ORGANIZED HEALTH CARE EDUCATION/TRAINING PROGRAM

## 2025-02-05 PROCEDURE — 99213 OFFICE O/P EST LOW 20 MIN: CPT | Performed by: STUDENT IN AN ORGANIZED HEALTH CARE EDUCATION/TRAINING PROGRAM

## 2025-02-05 PROCEDURE — 1159F MED LIST DOCD IN RCRD: CPT | Performed by: STUDENT IN AN ORGANIZED HEALTH CARE EDUCATION/TRAINING PROGRAM

## 2025-02-05 PROCEDURE — 3080F DIAST BP >= 90 MM HG: CPT | Performed by: STUDENT IN AN ORGANIZED HEALTH CARE EDUCATION/TRAINING PROGRAM

## 2025-02-05 PROCEDURE — 1126F AMNT PAIN NOTED NONE PRSNT: CPT | Performed by: STUDENT IN AN ORGANIZED HEALTH CARE EDUCATION/TRAINING PROGRAM

## 2025-02-05 RX ORDER — SALICYLIC ACID 60 MG/G
1 GEL TOPICAL DAILY
Qty: 100 G | Refills: 0 | Status: SHIPPED | OUTPATIENT
Start: 2025-02-05

## 2025-02-05 NOTE — PROGRESS NOTES
Office Note     Name: Bradley Mukherjee    : 1990     MRN: [id number]     Chief Complaint  Mass (Believes has a wart on finger )    Subjective     History of Present Illness:  Bradley Mukherjee is a 34 y.o. male who presents today     Has a wart on his finger.         Review of Systems:   Review of Systems   All other systems reviewed and are negative.      Past Medical History:   Past Medical History:   Diagnosis Date    Anxiety and depression 2020    Bipolar disorder  2020    Diastematomyelia  2020    History of drug abuse 2010    Crack    History of heart attack     Due to cocaine overdose    Hx of tooth extraction 2021    Hypertension 2020    Injury of back     Stroke        Past Surgical History:   Past Surgical History:   Procedure Laterality Date    TEETH EXTRACTION         Family History:   Family History   Adopted: Yes   Problem Relation Age of Onset    No Known Problems Mother        Social History:   Social History     Socioeconomic History    Marital status: Single   Tobacco Use    Smoking status: Every Day     Current packs/day: 0.50     Average packs/day: 0.5 packs/day for 15.0 years (7.5 ttl pk-yrs)     Types: Cigarettes    Smokeless tobacco: Never    Tobacco comments:     Tried patches. But they did not help.   Vaping Use    Vaping status: Never Used   Substance and Sexual Activity    Alcohol use: Yes     Comment: VERY SELDOM    Drug use: Yes     Types: Marijuana, Cocaine(coke)     Comment: LAST DRUG USE     Sexual activity: Defer       Immunizations:   Immunization History   Administered Date(s) Administered    COVID-19 (PFIZER) Purple Cap Monovalent 2021    Tdap 2014        Medications:     Current Outpatient Medications:     atorvastatin (LIPITOR) 80 MG tablet, Take 1 tablet by mouth every night at bedtime., Disp: 90 tablet, Rfl: 1    azithromycin (ZITHROMAX) 250 MG tablet, Take 2 tablets the first day, then 1 tablet daily for 4 days.,  "Disp: 6 tablet, Rfl: 0    Farxiga 10 MG tablet, Take 10 mg by mouth Daily., Disp: 30 tablet, Rfl: 2    glucose blood (FREESTYLE LITE) test strip, CHECK BLOOD SUGAR DAILY AS DIRECTED, E11.9, Disp: 100 each, Rfl: 2    glucose monitor monitoring kit, Use as directed, E11.9, Disp: 1 each, Rfl: 0    guaifenesin-dextromethorphan 600-30 mg (MUCINEX DM)  MG tablet sustained-release 12 hour, Take 1 tablet by mouth 2 (Two) Times a Day As Needed (cough/congestion)., Disp: 20 tablet, Rfl: 0    Lancet Device misc, Use with glucometer daily as directed, E11.9, Disp: 100 each, Rfl: 1    Lancets (freestyle) lancets, , Disp: , Rfl:     lisinopril (PRINIVIL,ZESTRIL) 10 MG tablet, Take 1 tablet by mouth Daily., Disp: 30 tablet, Rfl: 2    Lumateperone Tosylate (Caplyta) 42 MG capsule, Take 1 capsule by mouth Daily., Disp: 30 capsule, Rfl: 0    metFORMIN ER (GLUCOPHAGE-XR) 500 MG 24 hr tablet, Take 2 tablets by mouth 2 (Two) Times a Day., Disp: 360 tablet, Rfl: 1    metoprolol succinate XL (TOPROL-XL) 25 MG 24 hr tablet, Take 1 tablet by mouth Every Night., Disp: 90 tablet, Rfl: 1    salicylic acid 6 % gel, Apply 1 Application topically to the appropriate area as directed Daily., Disp: 100 g, Rfl: 0    Allergies:   Allergies   Allergen Reactions    Mushroom Extract Complex (Do Not Select) Swelling    Other Other (See Comments)     Narcotics.  Pt states no narcotics.       Objective     Vital Signs  /90   Pulse 92   Temp 96.8 °F (36 °C) (Temporal)   Ht 175.3 cm (69.02\")   Wt 97.4 kg (214 lb 12.8 oz)   SpO2 98%   BMI 31.71 kg/m²   Estimated body mass index is 31.71 kg/m² as calculated from the following:    Height as of this encounter: 175.3 cm (69.02\").    Weight as of this encounter: 97.4 kg (214 lb 12.8 oz).            Physical Exam  Constitutional:       Appearance: Normal appearance.   Skin:     General: Skin is warm.      Findings: Lesion present.          Neurological:      Mental Status: He is alert.      "     Result Review :                  Assessment and Plan     1. Viral warts, unspecified type    - salicylic acid 6 % gel; Apply 1 Application topically to the appropriate area as directed Daily.  Dispense: 100 g; Refill: 0       Follow Up  No follow-ups on file.    MD JESSICA BourneE PC [address]  [facility]  [address]  [address] [id number] [phone]

## 2025-03-18 ENCOUNTER — OFFICE VISIT (OUTPATIENT)
Dept: INTERNAL MEDICINE | Facility: CLINIC | Age: 35
End: 2025-03-18
Payer: MEDICAID

## 2025-03-18 ENCOUNTER — LAB (OUTPATIENT)
Dept: INTERNAL MEDICINE | Facility: CLINIC | Age: 35
End: 2025-03-18
Payer: MEDICAID

## 2025-03-18 VITALS
SYSTOLIC BLOOD PRESSURE: 132 MMHG | DIASTOLIC BLOOD PRESSURE: 90 MMHG | WEIGHT: 213.6 LBS | HEART RATE: 112 BPM | OXYGEN SATURATION: 98 % | BODY MASS INDEX: 31.64 KG/M2 | TEMPERATURE: 98.2 F | HEIGHT: 69 IN

## 2025-03-18 DIAGNOSIS — I10 ESSENTIAL HYPERTENSION: ICD-10-CM

## 2025-03-18 DIAGNOSIS — E11.65 UNCONTROLLED TYPE 2 DIABETES MELLITUS WITH HYPERGLYCEMIA: ICD-10-CM

## 2025-03-18 DIAGNOSIS — E11.9 CONTROLLED TYPE 2 DIABETES MELLITUS WITHOUT COMPLICATION, WITHOUT LONG-TERM CURRENT USE OF INSULIN: Primary | ICD-10-CM

## 2025-03-18 DIAGNOSIS — F31.62 BIPOLAR DISORDER, CURRENT EPISODE MIXED, MODERATE: ICD-10-CM

## 2025-03-18 DIAGNOSIS — R53.83 OTHER FATIGUE: ICD-10-CM

## 2025-03-18 LAB
DEPRECATED RDW RBC AUTO: 39.4 FL (ref 37–54)
ERYTHROCYTE [DISTWIDTH] IN BLOOD BY AUTOMATED COUNT: 11.7 % (ref 12.3–15.4)
EXPIRATION DATE: ABNORMAL
HBA1C MFR BLD: 8.6 % (ref 4.5–5.7)
HCT VFR BLD AUTO: 49.7 % (ref 37.5–51)
HGB BLD-MCNC: 16.6 G/DL (ref 13–17.7)
Lab: ABNORMAL
MCH RBC QN AUTO: 30.9 PG (ref 26.6–33)
MCHC RBC AUTO-ENTMCNC: 33.4 G/DL (ref 31.5–35.7)
MCV RBC AUTO: 92.4 FL (ref 79–97)
PLATELET # BLD AUTO: 184 10*3/MM3 (ref 140–450)
PMV BLD AUTO: 9.8 FL (ref 6–12)
RBC # BLD AUTO: 5.38 10*6/MM3 (ref 4.14–5.8)
WBC NRBC COR # BLD AUTO: 6.05 10*3/MM3 (ref 3.4–10.8)

## 2025-03-18 PROCEDURE — 85027 COMPLETE CBC AUTOMATED: CPT | Performed by: STUDENT IN AN ORGANIZED HEALTH CARE EDUCATION/TRAINING PROGRAM

## 2025-03-18 PROCEDURE — 82306 VITAMIN D 25 HYDROXY: CPT | Performed by: STUDENT IN AN ORGANIZED HEALTH CARE EDUCATION/TRAINING PROGRAM

## 2025-03-18 PROCEDURE — 84443 ASSAY THYROID STIM HORMONE: CPT | Performed by: STUDENT IN AN ORGANIZED HEALTH CARE EDUCATION/TRAINING PROGRAM

## 2025-03-18 PROCEDURE — 80053 COMPREHEN METABOLIC PANEL: CPT | Performed by: STUDENT IN AN ORGANIZED HEALTH CARE EDUCATION/TRAINING PROGRAM

## 2025-03-18 PROCEDURE — 82570 ASSAY OF URINE CREATININE: CPT | Performed by: STUDENT IN AN ORGANIZED HEALTH CARE EDUCATION/TRAINING PROGRAM

## 2025-03-18 PROCEDURE — 36415 COLL VENOUS BLD VENIPUNCTURE: CPT | Performed by: NURSE PRACTITIONER

## 2025-03-18 PROCEDURE — 82607 VITAMIN B-12: CPT | Performed by: STUDENT IN AN ORGANIZED HEALTH CARE EDUCATION/TRAINING PROGRAM

## 2025-03-18 PROCEDURE — 82043 UR ALBUMIN QUANTITATIVE: CPT | Performed by: STUDENT IN AN ORGANIZED HEALTH CARE EDUCATION/TRAINING PROGRAM

## 2025-03-18 RX ORDER — DAPAGLIFLOZIN 10 MG/1
10 TABLET, FILM COATED ORAL DAILY
Qty: 90 TABLET | Refills: 3 | Status: SHIPPED | OUTPATIENT
Start: 2025-03-18

## 2025-03-18 RX ORDER — LISINOPRIL 10 MG/1
10 TABLET ORAL DAILY
Qty: 30 TABLET | Refills: 2 | Status: SHIPPED | OUTPATIENT
Start: 2025-03-18

## 2025-03-18 NOTE — ASSESSMENT & PLAN NOTE
Acute new problem of uncertain prognosis. Having a hard time staying awake during the day despite sleeping 10 hours a night and feeling well rested. He snores and has reported apenic events but has had a sleep study and was told he does not need a CPAP. His mood has been good though a bit unstable. No new major life stressors or changes. We will address mood concerns today and obtain labs to r/o vitamin deficiency, etc.    [FreeTextEntry1] : Colon cancer screening

## 2025-03-18 NOTE — ASSESSMENT & PLAN NOTE
Chronic, uncontrolled. Has been compliant with Metformin 1000mg BID. He stopped Farxiga. We will resume and repeat A1c in 6 months. He has not had any low sugars.

## 2025-03-18 NOTE — ASSESSMENT & PLAN NOTE
Chronic, uncontrolled. Initially did well with Capylyta but then developed bad neuropathic pain (unclear if this is related). He has tried many medications in the past and can't seem to find one that works for him. We will do GeneSight testing today. Recommended ongoing care with behavioral health for med management. Will touch base once Gene Sight results.

## 2025-03-18 NOTE — PROGRESS NOTES
Office Note     Name: Bradley Mukherjee    : 1990     MRN: [id number]     Chief Complaint  Diabetes (Last checked blood sugar 2 days ago. It was 187 ), Fatigue (Falls asleep right after eating. For a couple weeks. Getting worse), and Mental Health Problem (Tried Caplyta samples. Worked well but did not like the generic from pharmacy)    Subjective     History of Present Illness:  Bradley Mukherjee is a 34 y.o. male who presents today for     Bad fatigue  Can't stay awake when driving, after eating.   Sleeps well overnight. Gets 9-11 hours of sleep at night. Goe    PHQ-2 Total Score: 0        Review of Systems:   Review of Systems    Past Medical History:   Past Medical History:   Diagnosis Date    Anxiety and depression 2020    Bipolar disorder  2020    Diastematomyelia  2020    History of drug abuse 2010    Crack    History of heart attack     Due to cocaine overdose    Hx of tooth extraction 2021    Hypertension 2020    Injury of back     Stroke        Past Surgical History:   Past Surgical History:   Procedure Laterality Date    TEETH EXTRACTION         Family History:   Family History   Adopted: Yes   Problem Relation Age of Onset    No Known Problems Mother        Social History:   Social History     Socioeconomic History    Marital status: Single   Tobacco Use    Smoking status: Every Day     Current packs/day: 0.50     Average packs/day: 0.5 packs/day for 15.0 years (7.5 ttl pk-yrs)     Types: Cigarettes    Smokeless tobacco: Never    Tobacco comments:     Tried patches. But they did not help.   Vaping Use    Vaping status: Never Used   Substance and Sexual Activity    Alcohol use: Yes     Comment: VERY SELDOM    Drug use: Yes     Types: Marijuana, Cocaine(coke)     Comment: LAST DRUG USE     Sexual activity: Defer       Immunizations:   Immunization History   Administered Date(s) Administered    COVID-19 (PFIZER) Purple Cap Monovalent 2021    Tdap  01/18/2014        Medications:     Current Outpatient Medications:     atorvastatin (LIPITOR) 80 MG tablet, Take 1 tablet by mouth every night at bedtime., Disp: 90 tablet, Rfl: 1    Farxiga 10 MG tablet, Take 10 mg by mouth Daily., Disp: 90 tablet, Rfl: 3    Lancet Device mis, Use with glucometer daily as directed, E11.9, Disp: 100 each, Rfl: 1    Lancets (freestyle) lancets, , Disp: , Rfl:     lisinopril (PRINIVIL,ZESTRIL) 10 MG tablet, Take 1 tablet by mouth Daily., Disp: 30 tablet, Rfl: 2    metFORMIN ER (GLUCOPHAGE-XR) 500 MG 24 hr tablet, Take 2 tablets by mouth 2 (Two) Times a Day., Disp: 360 tablet, Rfl: 1    azithromycin (ZITHROMAX) 250 MG tablet, Take 2 tablets the first day, then 1 tablet daily for 4 days. (Patient not taking: Reported on 3/18/2025), Disp: 6 tablet, Rfl: 0    glucose blood (FREESTYLE LITE) test strip, CHECK BLOOD SUGAR DAILY AS DIRECTED, E11.9 (Patient not taking: Reported on 3/18/2025), Disp: 100 each, Rfl: 2    glucose monitor monitoring kit, Use as directed, E11.9 (Patient not taking: Reported on 3/18/2025), Disp: 1 each, Rfl: 0    guaifenesin-dextromethorphan 600-30 mg (MUCINEX DM)  MG tablet sustained-release 12 hour, Take 1 tablet by mouth 2 (Two) Times a Day As Needed (cough/congestion). (Patient not taking: Reported on 3/18/2025), Disp: 20 tablet, Rfl: 0    Lumateperone Tosylate (Caplyta) 42 MG capsule, Take 1 capsule by mouth Daily. (Patient not taking: Reported on 3/18/2025), Disp: 30 capsule, Rfl: 0    metoprolol succinate XL (TOPROL-XL) 25 MG 24 hr tablet, Take 1 tablet by mouth Every Night. (Patient not taking: Reported on 3/18/2025), Disp: 90 tablet, Rfl: 1    salicylic acid 6 % gel, Apply 1 Application topically to the appropriate area as directed Daily. (Patient not taking: Reported on 3/18/2025), Disp: 100 g, Rfl: 0    Allergies:   Allergies   Allergen Reactions    Mushroom Extract Complex (Obsolete) Swelling    Other Other (See Comments)     Narcotics.  Pt states  "no narcotics.       Objective     Vital Signs  /90   Pulse 112   Temp 98.2 °F (36.8 °C) (Infrared)   Ht 175.3 cm (69.02\")   Wt 96.9 kg (213 lb 9.6 oz)   SpO2 98%   BMI 31.53 kg/m²   Estimated body mass index is 31.53 kg/m² as calculated from the following:    Height as of this encounter: 175.3 cm (69.02\").    Weight as of this encounter: 96.9 kg (213 lb 9.6 oz).    {BMI is >= 30 and <35. (Class 1 Obesity). The following options were offered after discussion; (Optional):17028}       Physical Exam   ***    Procedures     Results:  No results found for this or any previous visit (from the past 24 hours).     Assessment and Plan     Assessment/Plan:  Diagnoses and all orders for this visit:    1. Controlled type 2 diabetes mellitus without complication, without long-term current use of insulin (Primary)  -     POC Glycosylated Hemoglobin (Hb A1C)    2. Essential hypertension  -     lisinopril (PRINIVIL,ZESTRIL) 10 MG tablet; Take 1 tablet by mouth Daily.  Dispense: 30 tablet; Refill: 2    3. Other fatigue  -     TSH; Future  -     CBC No Differential; Future  -     Comprehensive metabolic panel; Future  -     Vitamin B12; Future  -     Vitamin D 25 hydroxy; Future    4. Uncontrolled type 2 diabetes mellitus with hyperglycemia  -     Farxiga 10 MG tablet; Take 10 mg by mouth Daily.  Dispense: 90 tablet; Refill: 3  -     Microalbumin / Creatinine Urine Ratio - Urine, Clean Catch; Future        Follow Up  No follow-ups on file.    Nelly Delgadillo MD   [facility] [place] [facility]  "

## 2025-03-18 NOTE — PROGRESS NOTES
Office Note     Name: Bradley Mukherjee    : 1990     MRN: [id number]     Chief Complaint  Diabetes (Last checked blood sugar 2 days ago. It was 187 ), Fatigue (Falls asleep right after eating. For a couple weeks. Getting worse), and Mental Health Problem (Tried Caplyta samples. Worked well but did not like the generic from pharmacy)    Subjective     History of Present Illness:  History of Present Illness  The patient presents for evaluation of diabetes, fatigue, mood disorder, and hypertension.    He reports experiencing difficulty in maintaining wakefulness, particularly during driving and postprandial periods. He has been grappling with significant fatigue for the past 2 to 3 weeks, which he describes as a sudden onset. His sleep pattern is characterized by going to bed around 9:30 or 10:00 PM and waking up between 8:00 and 8:30 AM. He also reports intermittent snoring and brief apneic episodes during sleep. A previous sleep study indicated borderline sleep apnea, but no treatment was deemed necessary. He has not undergone any recent blood work. He has not observed any yellowing of his eyes, but it is reported that his eyes appear yellowish when he is dehydrated and clear up with adequate hydration. He consumes a significant amount of caffeine, including 3 Monster energy drinks daily. He reports no recent hypoglycemic episodes. He monitors his blood glucose levels every few days, with the most recent reading being 187. He has previously recorded a reading of 250, but none below this level. He has discontinued Farxiga since 2024 due to its inability to maintain his blood glucose within the normal range, often dropping to the 50s and 60s. He has not experienced any recent stressors or changes in his medication regimen. He reports no changes in appetite, presence of blood in stools, or chest discomfort.    He has discontinued Caplyta due to adverse effects, including a sensation of burning veins.  He expresses a preference for Prozac, despite its association with suicidal ideation. It is reported that his mood fluctuates, with periods of stability interspersed with episodes of anger. He has tried various medications in the past, including Prozac and Paxil. He has not undergone Instantis testing. He has been diagnosed with bipolar disorder and borderline personality disorder.    He is currently on lisinopril and requests a refill.    SOCIAL HISTORY  The patient admits to nicotine use and occasional alcohol consumption. 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    History of drug abuse 2010    Crack    History of heart attack     Due to cocaine overdose    Hx of tooth extraction 01/06/2021    Hypertension 07/02/2020    Injury of back     Stroke        Past Surgical History:   Past Surgical History:   Procedure Laterality Date    TEETH EXTRACTION         Family History:   Family History   Adopted: Yes   Problem Relation Age of Onset    No Known Problems Mother        Social History:   Social History     Socioeconomic History    Marital status: Single   Tobacco Use    Smoking status: Every Day     Current packs/day: 0.50     Average packs/day: 0.5 packs/day for 15.0 years (7.5 ttl pk-yrs)     Types: Cigarettes    Smokeless tobacco: Never    Tobacco comments:     Tried patches. But they did not help.   Vaping Use    Vaping status: Never Used   Substance and Sexual Activity    Alcohol use: Yes     Comment: VERY SELDOM    Drug use: Yes     Types: Marijuana, Cocaine(coke)     Comment: LAST DRUG USE 2012    Sexual activity: Defer       Immunizations:   Immunization History   Administered Date(s) Administered    COVID-19 (PFIZER) Purple  Cap Monovalent 08/23/2021    Tdap 01/18/2014        Medications:     Current Outpatient Medications:     atorvastatin (LIPITOR) 80 MG tablet, Take 1 tablet by mouth every night at bedtime., Disp: 90 tablet, Rfl: 1    Farxiga 10 MG tablet, Take 10 mg by mouth Daily., Disp: 90 tablet, Rfl: 3    Lancet Device mis, Use with glucometer daily as directed, E11.9, Disp: 100 each, Rfl: 1    Lancets (freestyle) lancets, , Disp: , Rfl:     lisinopril (PRINIVIL,ZESTRIL) 10 MG tablet, Take 1 tablet by mouth Daily., Disp: 30 tablet, Rfl: 2    metFORMIN ER (GLUCOPHAGE-XR) 500 MG 24 hr tablet, Take 2 tablets by mouth 2 (Two) Times a Day., Disp: 360 tablet, Rfl: 1    azithromycin (ZITHROMAX) 250 MG tablet, Take 2 tablets the first day, then 1 tablet daily for 4 days. (Patient not taking: Reported on 3/18/2025), Disp: 6 tablet, Rfl: 0    glucose blood (FREESTYLE LITE) test strip, CHECK BLOOD SUGAR DAILY AS DIRECTED, E11.9 (Patient not taking: Reported on 3/18/2025), Disp: 100 each, Rfl: 2    glucose monitor monitoring kit, Use as directed, E11.9 (Patient not taking: Reported on 3/18/2025), Disp: 1 each, Rfl: 0    guaifenesin-dextromethorphan 600-30 mg (MUCINEX DM)  MG tablet sustained-release 12 hour, Take 1 tablet by mouth 2 (Two) Times a Day As Needed (cough/congestion). (Patient not taking: Reported on 3/18/2025), Disp: 20 tablet, Rfl: 0    Lumateperone Tosylate (Caplyta) 42 MG capsule, Take 1 capsule by mouth Daily. (Patient not taking: Reported on 3/18/2025), Disp: 30 capsule, Rfl: 0    metoprolol succinate XL (TOPROL-XL) 25 MG 24 hr tablet, Take 1 tablet by mouth Every Night. (Patient not taking: Reported on 3/18/2025), Disp: 90 tablet, Rfl: 1    salicylic acid 6 % gel, Apply 1 Application topically to the appropriate area as directed Daily. (Patient not taking: Reported on 3/18/2025), Disp: 100 g, Rfl: 0    Allergies:   Allergies   Allergen Reactions    Mushroom Extract Complex (Obsolete) Swelling    Other Other (See  "Comments)     Narcotics.  Pt states no narcotics.       Objective     Vital Signs  /90   Pulse 112   Temp 98.2 °F (36.8 °C) (Infrared)   Ht 175.3 cm (69.02\")   Wt 96.9 kg (213 lb 9.6 oz)   SpO2 98%   BMI 31.53 kg/m²   Estimated body mass index is 31.53 kg/m² as calculated from the following:    Height as of this encounter: 175.3 cm (69.02\").    Weight as of this encounter: 96.9 kg (213 lb 9.6 oz).      Physical Exam  Constitutional:       Appearance: Normal appearance.   HENT:      Head: Normocephalic and atraumatic.      Nose: Nose normal.   Eyes:      Conjunctiva/sclera: Conjunctivae normal.      Pupils: Pupils are equal, round, and reactive to light.   Cardiovascular:      Rate and Rhythm: Normal rate and regular rhythm.   Pulmonary:      Effort: Pulmonary effort is normal.   Abdominal:      General: Abdomen is flat.   Neurological:      General: No focal deficit present.      Mental Status: He is alert and oriented to person, place, and time.   Psychiatric:         Mood and Affect: Mood normal.         Behavior: Behavior normal.         Thought Content: Thought content normal.        Physical Exam      Procedures     Results:  Recent Results (from the past 24 hours)   POC Glycosylated Hemoglobin (Hb A1C)    Collection Time: 03/18/25  4:05 PM    Specimen: Blood   Result Value Ref Range    Hemoglobin A1C 8.6 (A) 4.5 - 5.7 %    Lot Number [id number]     Expiration Date 11/4/2026       Results  Laboratory Studies  A1c is 8.6.        Assessment and Plan     Assessment/Plan:  Diagnoses and all orders for this visit:    1. Controlled type 2 diabetes mellitus without complication, without long-term current use of insulin (Primary)  Assessment & Plan:  Chronic, uncontrolled. Has been compliant with Metformin 1000mg BID. He stopped Farxiga. We will resume and repeat A1c in 6 months. He has not had any low sugars.     Orders:  -     POC Glycosylated Hemoglobin (Hb A1C)    2. Essential hypertension  Assessment & " Plan:  Chronic, stable. Continue current meds, refill today.    Orders:  -     lisinopril (PRINIVIL,ZESTRIL) 10 MG tablet; Take 1 tablet by mouth Daily.  Dispense: 30 tablet; Refill: 2    3. Other fatigue  -     TSH; Future  -     CBC No Differential; Future  -     Comprehensive metabolic panel; Future  -     Vitamin B12; Future  -     Vitamin D 25 hydroxy; Future    4. Uncontrolled type 2 diabetes mellitus with hyperglycemia  -     Farxiga 10 MG tablet; Take 10 mg by mouth Daily.  Dispense: 90 tablet; Refill: 3  -     Microalbumin / Creatinine Urine Ratio - Urine, Clean Catch; Future    5. Bipolar disorder, current episode mixed, moderate  Assessment & Plan:  Chronic, uncontrolled. Initially did well with Capylyta but then developed bad neuropathic pain (unclear if this is related). He has tried many medications in the past and can't seem to find one that works for him. We will do GeneSight testing today. Recommended ongoing care with behavioral health for med management. Will touch base once Gene Sight results.     Orders:  -     GeneSight - Swab,; Future  -     GeneSight - Swab,      Assessment & Plan  1. Diabetes Mellitus: A1c 8.6 on 10/04/2023.  - Refill prescription for Farxiga.  - Maintain a balanced diet, limit carbohydrate intake, and avoid sugary beverages, teas, and juices.  - Conduct blood work to rule out potential liver issues, jaundice, kidney dysfunction, and vitamin deficiencies.    2. Fatigue: Acute.  - Conduct blood work to investigate potential causes, including liver function, kidney function, and vitamin deficiencies.    3. Bipolar Disorder: Stable.  - Conduct GeneSight test to guide the selection of appropriate antidepressants, antianxiety medications, and mood stabilizers.    4. Hypertension.  - Refill prescription for lisinopril.    Follow-up  - Schedule follow-up appointment.    Follow Up  Return in about 6 weeks (around 4/29/2025).    Patient or patient representative verbalized consent  for the use of Ambient Listening during the visit with  Nelly Delgadillo MD for chart documentation. 3/18/2025  16:35 EDT    Nelly Delgadillo MD   [facility]

## 2025-03-19 LAB
25(OH)D3 SERPL-MCNC: 22.6 NG/ML (ref 30–100)
ALBUMIN SERPL-MCNC: 4.1 G/DL (ref 3.5–5.2)
ALBUMIN UR-MCNC: <1.2 MG/DL
ALBUMIN/GLOB SERPL: 1.2 G/DL
ALP SERPL-CCNC: 120 U/L (ref 39–117)
ALT SERPL W P-5'-P-CCNC: 48 U/L (ref 1–41)
ANION GAP SERPL CALCULATED.3IONS-SCNC: 12.8 MMOL/L (ref 5–15)
AST SERPL-CCNC: 22 U/L (ref 1–40)
BILIRUB SERPL-MCNC: 0.3 MG/DL (ref 0–1.2)
BUN SERPL-MCNC: 10 MG/DL (ref 6–20)
BUN/CREAT SERPL: 10.8 (ref 7–25)
CALCIUM SPEC-SCNC: 9.3 MG/DL (ref 8.6–10.5)
CHLORIDE SERPL-SCNC: 97 MMOL/L (ref 98–107)
CO2 SERPL-SCNC: 26.2 MMOL/L (ref 22–29)
CREAT SERPL-MCNC: 0.93 MG/DL (ref 0.76–1.27)
CREAT UR-MCNC: 57.4 MG/DL
EGFRCR SERPLBLD CKD-EPI 2021: 110.5 ML/MIN/1.73
GLOBULIN UR ELPH-MCNC: 3.4 GM/DL
GLUCOSE SERPL-MCNC: 276 MG/DL (ref 65–99)
MICROALBUMIN/CREAT UR: NORMAL MG/G{CREAT}
POTASSIUM SERPL-SCNC: 3.8 MMOL/L (ref 3.5–5.2)
PROT SERPL-MCNC: 7.5 G/DL (ref 6–8.5)
SODIUM SERPL-SCNC: 136 MMOL/L (ref 136–145)
TSH SERPL DL<=0.05 MIU/L-ACNC: 1.01 UIU/ML (ref 0.27–4.2)
VIT B12 BLD-MCNC: 1069 PG/ML (ref 211–946)

## 2025-03-25 ENCOUNTER — TELEPHONE (OUTPATIENT)
Dept: INTERNAL MEDICINE | Facility: CLINIC | Age: 35
End: 2025-03-25
Payer: MEDICAID

## 2025-03-25 NOTE — TELEPHONE ENCOUNTER
Patient called because he was supposed to get a script for vitamin d sent to the pharmacy.  Can you send this in?

## 2025-03-26 RX ORDER — FAMOTIDINE 20 MG
25 TABLET ORAL DAILY
Qty: 60 CAPSULE | Refills: 3 | Status: SHIPPED | OUTPATIENT
Start: 2025-03-26

## 2025-04-29 ENCOUNTER — OFFICE VISIT (OUTPATIENT)
Dept: INTERNAL MEDICINE | Facility: CLINIC | Age: 35
End: 2025-04-29
Payer: MEDICAID

## 2025-04-29 VITALS
OXYGEN SATURATION: 94 % | HEART RATE: 96 BPM | DIASTOLIC BLOOD PRESSURE: 76 MMHG | TEMPERATURE: 97.8 F | SYSTOLIC BLOOD PRESSURE: 134 MMHG | RESPIRATION RATE: 10 BRPM | WEIGHT: 213 LBS | BODY MASS INDEX: 31.55 KG/M2 | HEIGHT: 69 IN

## 2025-04-29 DIAGNOSIS — I10 ESSENTIAL HYPERTENSION: ICD-10-CM

## 2025-04-29 DIAGNOSIS — F31.62 BIPOLAR DISORDER, CURRENT EPISODE MIXED, MODERATE: Chronic | ICD-10-CM

## 2025-04-29 DIAGNOSIS — L98.9 SKIN LESION: ICD-10-CM

## 2025-04-29 DIAGNOSIS — E11.9 CONTROLLED TYPE 2 DIABETES MELLITUS WITHOUT COMPLICATION, WITHOUT LONG-TERM CURRENT USE OF INSULIN: Primary | ICD-10-CM

## 2025-04-29 DIAGNOSIS — E78.2 MIXED HYPERLIPIDEMIA: ICD-10-CM

## 2025-04-29 RX ORDER — LAMOTRIGINE 25 MG/1
25 TABLET ORAL DAILY
Qty: 30 TABLET | Refills: 2 | Status: SHIPPED | OUTPATIENT
Start: 2025-04-29

## 2025-04-29 RX ORDER — LISINOPRIL 20 MG/1
20 TABLET ORAL DAILY
Qty: 90 TABLET | Refills: 1 | Status: SHIPPED | OUTPATIENT
Start: 2025-04-29

## 2025-04-29 NOTE — PROGRESS NOTES
Subjective   Bradley Mukherjee is a 34 y.o. male    Chief Complaint   Patient presents with    Mental Health Problem     For now patient is stable with his mental health, Genesight testing was completed at [facility] with Dr. Delgadillo     Diabetes     Doing well with the farxiga, no side effects or issues today with medication      History of Present Illness     DM II - chronic; Current regimen is Metformin XR 1000 mg BID and Farxiga 10 mg daily  He has been able to increase his Metformin to the full 1000 mg  BID.  Was on Ozempic, but stopped due to SE's.  Last A1C was 8.6% on 3/18/2025.  States that he was not consistently taking his Meds when his A1c was last drawn.  He has been taking both meds as directed since his last A1C.     HTN - chronic; stopped taking his Metoprolol a few months ago.  Losartan was added.  He stopped the losartan. Now taking Lisinopril 10 mg daily    HL - chronic; should be taking Lipitor 80 mg daily  Lab Results   Component Value Date    CHOL 145 06/19/2024    CHLPL 104 01/25/2024    TRIG 128 06/19/2024    HDL 35 (L) 06/19/2024    LDL 87 06/19/2024      Bipolar - chronic; pt reported that sx's were worsening a few months ago.  Reported that he was up and down, and swings were more intense.  Was taking Prozac, but felt like it was getting worse so he stopped taking this a few months ago.  Was changed to Zoloft and he was very, very down on this medication. At last visit, we initiated Vrayla and referred to .   then stopped Vraylar and was started on Caplyta. He ended up stopping this due to SE's as well.  He has not followed back up with .  Was seen here on 3/18/25 by MS and genesight was sent.     COVID - declines  Flu shot - declines  Tdap - declines  Prevnar - declines  Eye exam - 12/5/23  Microalbumin - 1/2024       The following portions of the patient's history were reviewed and updated as appropriate: allergies, current medications, past family history, past medical history, past  social history, past surgical history, and problem list.    Current Outpatient Medications:     atorvastatin (LIPITOR) 80 MG tablet, Take 1 tablet by mouth every night at bedtime., Disp: 90 tablet, Rfl: 1    Farxiga 10 MG tablet, Take 10 mg by mouth Daily., Disp: 90 tablet, Rfl: 3    Lancet Device misc, Use with glucometer daily as directed, E11.9, Disp: 100 each, Rfl: 1    metFORMIN ER (GLUCOPHAGE-XR) 500 MG 24 hr tablet, Take 2 tablets by mouth 2 (Two) Times a Day., Disp: 360 tablet, Rfl: 1    Vitamin D, Cholecalciferol, 25 MCG (1000 UT) capsule, Take 1 capsule by mouth Daily., Disp: 60 capsule, Rfl: 3    lamoTRIgine (LaMICtal) 25 MG tablet, Take 1 tablet by mouth Daily., Disp: 30 tablet, Rfl: 2    lisinopril (PRINIVIL,ZESTRIL) 20 MG tablet, Take 1 tablet by mouth Daily., Disp: 90 tablet, Rfl: 1     Review of Systems   Constitutional:  Negative for chills, fatigue and fever.   Respiratory:  Negative for cough, chest tightness and shortness of breath.    Cardiovascular:  Negative for chest pain.   Gastrointestinal:  Negative for abdominal pain, diarrhea, nausea and vomiting.   Endocrine: Negative for cold intolerance and heat intolerance.   Musculoskeletal:  Negative for arthralgias.   Neurological:  Negative for dizziness.   Psychiatric/Behavioral:  Positive for agitation, decreased concentration and dysphoric mood. Negative for self-injury, sleep disturbance and suicidal ideas. The patient is nervous/anxious.        Objective   Physical Exam  Constitutional:       Appearance: He is well-developed.   HENT:      Head: Normocephalic and atraumatic.   Eyes:      Conjunctiva/sclera: Conjunctivae normal.      Pupils: Pupils are equal, round, and reactive to light.   Cardiovascular:      Rate and Rhythm: Normal rate and regular rhythm.      Heart sounds: Normal heart sounds.   Pulmonary:      Effort: Pulmonary effort is normal.      Breath sounds: Normal breath sounds.   Abdominal:      General: Bowel sounds are normal.  "     Palpations: Abdomen is soft.   Musculoskeletal:         General: Normal range of motion.      Cervical back: Normal range of motion.   Skin:     General: Skin is warm and dry.      Comments: Irregular nevus noted just inferior to the right axilla   Neurological:      Mental Status: He is alert and oriented to person, place, and time.      Deep Tendon Reflexes: Reflexes are normal and symmetric.   Psychiatric:         Mood and Affect: Mood is anxious and depressed.         Behavior: Behavior normal.         Thought Content: Thought content normal.         Judgment: Judgment normal.       Vitals:    04/29/25 1528   BP: 134/76   Pulse: 96   Resp: 10   Temp: 97.8 °F (36.6 °C)   TempSrc: Infrared   SpO2: 94%   Weight: 96.6 kg (213 lb)   Height: 175.3 cm (69.02\")         Assessment & Plan   Diagnoses and all orders for this visit:    1. Controlled type 2 diabetes mellitus without complication, without long-term current use of insulin (Primary)    2. Essential hypertension  -     lisinopril (PRINIVIL,ZESTRIL) 20 MG tablet; Take 1 tablet by mouth Daily.  Dispense: 90 tablet; Refill: 1    3. Mixed hyperlipidemia    4. Bipolar disorder, current episode mixed, moderate  -     lamoTRIgine (LaMICtal) 25 MG tablet; Take 1 tablet by mouth Daily.  Dispense: 30 tablet; Refill: 2    5. Skin lesion  -     Ambulatory Referral to Dermatology      [facility] reviewed  Will start Lamictal 25 mg daily  Lisinopril increased to 20 mg daily  Referred to Derm  Return in about 4 weeks (around 5/27/2025) for f/u.             "

## 2025-07-11 ENCOUNTER — APPOINTMENT (OUTPATIENT)
Dept: GENERAL RADIOLOGY | Facility: HOSPITAL | Age: 35
End: 2025-07-11
Payer: MEDICAID

## 2025-07-11 ENCOUNTER — HOSPITAL ENCOUNTER (EMERGENCY)
Facility: HOSPITAL | Age: 35
Discharge: HOME | End: 2025-07-12
Attending: EMERGENCY MEDICINE
Payer: MEDICAID

## 2025-07-11 VITALS
DIASTOLIC BLOOD PRESSURE: 99 MMHG | SYSTOLIC BLOOD PRESSURE: 142 MMHG | BODY MASS INDEX: 30.36 KG/M2 | HEART RATE: 98 BPM | TEMPERATURE: 98.8 F | OXYGEN SATURATION: 99 % | RESPIRATION RATE: 18 BRPM | HEIGHT: 69 IN | WEIGHT: 205 LBS

## 2025-07-11 DIAGNOSIS — S62.339A CLOSED BOXER'S FRACTURE, INITIAL ENCOUNTER: Primary | ICD-10-CM

## 2025-07-11 DIAGNOSIS — M79.641 RIGHT HAND PAIN: ICD-10-CM

## 2025-07-11 DIAGNOSIS — Z86.79 HISTORY OF HYPERTENSION: ICD-10-CM

## 2025-07-11 DIAGNOSIS — Z72.0 TOBACCO ABUSE: ICD-10-CM

## 2025-07-11 PROCEDURE — 99283 EMERGENCY DEPT VISIT LOW MDM: CPT

## 2025-07-11 PROCEDURE — 73130 X-RAY EXAM OF HAND: CPT

## 2025-07-11 RX ORDER — IBUPROFEN 600 MG/1
600 TABLET, FILM COATED ORAL ONCE
Status: COMPLETED | OUTPATIENT
Start: 2025-07-11 | End: 2025-07-11

## 2025-07-11 RX ORDER — ACETAMINOPHEN 500 MG
1000 TABLET ORAL ONCE
Status: COMPLETED | OUTPATIENT
Start: 2025-07-11 | End: 2025-07-11

## 2025-07-11 RX ADMIN — ACETAMINOPHEN 1000 MG: 500 TABLET, FILM COATED ORAL at 23:07

## 2025-07-11 RX ADMIN — IBUPROFEN 600 MG: 600 TABLET ORAL at 23:07

## 2025-07-11 NOTE — Clinical Note
[facility] EMERGENCY DEPARTMENT  [address]  [facility] [address]  Phone: [phone]    Bradley Mukherjee was seen and treated in our emergency department on 7/11/2025.  He may return to work on 07/14/2025.         Thank you for choosing [facility].    Gisella Walls, ARABELLA

## 2025-07-11 NOTE — Clinical Note
[facility] EMERGENCY DEPARTMENT  [address]  [facility] [address]  Phone: [phone]    Bradley Mukherjee was seen and treated in our emergency department on 7/11/2025.  He may return to work on 07/14/2025.         Thank you for choosing [facility].    Gisella Walls, ARABELLA

## 2025-07-12 NOTE — DISCHARGE INSTRUCTIONS
X-rays of the right hand reveal a angulated fracture of the right fifth metacarpal.  Recommend ice as needed for swelling and we applied a boxers splint to help with stabilization and comfort.  Recommend close follow-up with hand specialist and we gave to phone numbers for Dr. Yee and Dr. Tee.  Patient is to call the office Monday for first available recheck for evaluation and treatment plan options.  Alternate Tylenol/ibuprofen every 4-6 hours as needed for pain.  Return to the ER if worsening symptoms.

## 2025-07-12 NOTE — ED PROVIDER NOTES
Subjective   History of Present Illness  This is a 35-year-old male that presents to the ER with right hand injury that occurred approximately 3 hours prior to arrival.  Patient was working on a car and says that he was doing something with the alternator and his right hand slipped causing him to punch the engine in the car.  Patient reports swelling and tenderness to the right lateral aspect of hand along the fifth metacarpal.  He reports constant aching pain and intermittent sharp pain with movement of the fingers.  He denies any numbness or tingling to fingers on the right hand.  He is right-handed and has previous left hand fracture but no previous right hand fracture.  He denies any other injuries.  He says that he has history of substance abuse and refuses to take any type of controlled medications.  He also has past medical history of bipolar disorder, hypertension, anxiety/depression, history of MI, and tobacco dependence.  Patient did not take any Tylenol or ibuprofen prior to arrival.    History provided by:  Patient  Hand Injury  Location:  Hand  Hand location:  R hand  Injury: yes    Time since incident:  3 hours  Mechanism of injury comment:  Patient was working on a car and his hand slipped and he accidentally punched the engine  Pain details:     Quality:  Aching and sharp    Radiates to:  Does not radiate    Severity:  Moderate    Onset quality:  Sudden    Duration:  3 hours    Timing:  Constant    Progression:  Unchanged  Handedness:  Right-handed  Prior injury to area:  No (Previous left hand fracture)  Relieved by:  Nothing  Worsened by:  Movement (Palpation)  Ineffective treatments:  Ice  Associated symptoms: decreased range of motion and swelling    Associated symptoms: no numbness and no tingling        Review of Systems   Constitutional: Negative.    Respiratory:          Positive for tobacco abuse   Musculoskeletal:  Positive for arthralgias (Right hand pain s/p injury; 5th metacarpal.).    Skin:  Negative for color change and rash.        Localized swelling to right hand; dorsal aspect of hand around 5th MC.   Neurological: Negative.  Negative for numbness (No numbness/tingling to right fingertips.).   All other systems reviewed and are negative.      Past Medical History:   Diagnosis Date    Anxiety and depression 2020    Bipolar disorder  2020    Diastematomyelia  2020    History of drug abuse 2010    Crack    History of heart attack     Due to cocaine overdose    Hx of tooth extraction 2021    Hypertension 2020    Injury of back     Stroke        Allergies   Allergen Reactions    Mushroom Extract Complex (Obsolete) Swelling    Other Other (See Comments)     Narcotics.  Pt states no narcotics.       Past Surgical History:   Procedure Laterality Date    TEETH EXTRACTION         Family History   Adopted: Yes   Problem Relation Age of Onset    No Known Problems Mother        Social History     Socioeconomic History    Marital status: Single   Tobacco Use    Smoking status: Former     Current packs/day: 0.00     Average packs/day: 0.5 packs/day for 21.3 years (10.7 ttl pk-yrs)     Types: Cigarettes     Start date: 2004     Quit date: 2025     Years since quittin.2    Smokeless tobacco: Never    Tobacco comments:     Tried patches. But they did not help.   Vaping Use    Vaping status: Every Day    Substances: Nicotine    Devices: Disposable, Pre-filled pod   Substance and Sexual Activity    Alcohol use: Yes     Comment: VERY SELDOM    Drug use: Yes     Types: Marijuana, Cocaine(coke)     Comment: LAST DRUG USE     Sexual activity: Defer           Objective   Physical Exam  Vitals and nursing note reviewed.   Constitutional:       General: He is not in acute distress.     Appearance: Normal appearance. He is not ill-appearing, toxic-appearing or diaphoretic.      Comments: Strong tobacco odor noted.  No acute distress.  Nontoxic.   HENT:      Head:  Normocephalic and atraumatic.      Nose: Nose normal.      Mouth/Throat:      Mouth: Mucous membranes are moist.      Pharynx: Oropharynx is clear.      Comments: Oral mucous membranes are moist  Eyes:      Extraocular Movements: Extraocular movements intact.      Conjunctiva/sclera: Conjunctivae normal.      Pupils: Pupils are equal, round, and reactive to light.   Cardiovascular:      Rate and Rhythm: Normal rate and regular rhythm.      Pulses: Normal pulses.      Heart sounds: Normal heart sounds.      Comments: Regular rate and rhythm.  No tachycardia or ectopy.  Strong right radial and ulnar pulses and brisk capillary refill  Pulmonary:      Effort: Pulmonary effort is normal.      Breath sounds: Normal breath sounds.      Comments: Lungs are clear to auscultation bilaterally  Musculoskeletal:      Right hand: Swelling, tenderness and bony tenderness present. No deformity. Decreased range of motion. Normal strength. Normal sensation. Normal capillary refill. Normal pulse.      Cervical back: Normal range of motion and neck supple.      Comments: Patient has localized swelling and tenderness to the right fifth metacarpal.  No obvious deformity, but limited painful range of motion of the right fifth phalanx. No other bony tenderness of the right hand and no bony tenderness to right wrist or right elbow.   Skin:     General: Skin is warm and dry.      Findings: Signs of injury present. No abrasion, ecchymosis or laceration.      Comments: Localized swelling to dorsal/lateral aspect of right hand along the fifth metacarpal   Neurological:      General: No focal deficit present.      Mental Status: He is alert and oriented to person, place, and time.      Cranial Nerves: Cranial nerves 2-12 are intact.      Sensory: Sensation is intact.      Motor: Motor function is intact.      Coordination: Coordination is intact.      Gait: Gait is intact.      Comments: Neuro intact and nonfocal   Psychiatric:         Mood and  Affect: Mood and affect normal.         Speech: Speech normal.         Behavior: Behavior is cooperative.         Thought Content: Thought content normal.         Cognition and Memory: Cognition and memory normal.         Judgment: Judgment normal.      Comments: Normal mood and affect         Splint - Cast - Strapping    Date/Time: 7/12/2025 12:08 AM    Performed by: Gisella Walls PA-C  Authorized by: Nav Tim MD    Consent:     Consent obtained:  Verbal    Consent given by:  Patient    Risks, benefits, and alternatives were discussed: yes      Risks discussed:  Discoloration, numbness, pain and swelling    Alternatives discussed:  No treatment  Universal protocol:     Patient identity confirmed:  Verbally with patient  Pre-procedure details:     Distal neurologic exam:  Normal    Distal perfusion: distal pulses strong and brisk capillary refill    Procedure details:     Location:  Hand    Hand location:  R hand    Strapping: yes      Cast type:  Short arm    Upper extremity splint type: Boxer's splint; Ortho glass.    Supplies:  Cotton padding and prefabricated splint    Attestation: Splint applied and adjusted personally by me    Post-procedure details:     Distal neurologic exam:  Normal    Distal perfusion: distal pulses strong and brisk capillary refill      Procedure completion:  Tolerated well, no immediate complications    Post-procedure imaging: not applicable               ED Course  ED Course as of 07/12/25 0011   Fri Jul 11, 2025   2350 I personally interpreted x-rays of the right hand which revealed a slightly angulated right fifth metacarpal fracture.  No other acute osseous abnormality.  I will personally apply a boxers splint with Ortho-Glass splinting.  We will give follow-up info for Dr. Yee/, Hand orthopedist, for evaluation and treatment plan options.  I gave patient a dose of Tylenol and ibuprofen due to history of substance abuse at his request.  Recommend ice as  "needed for swelling.  Patient is neurovascularly intact.  Will prepare him for discharge. [FC]   7504 Radiologist formally read the film as comminuted fracture of the distal fifth metacarpal with volar angulation.  We will give follow-up info for hand specialist. [FC]   Sat Jul 12, 2025   0010 I personally applied Ortho-Glass boxers splint.  Patient was neurovascularly intact before and after first application.  I gave him hand specialist follow-up and he is agreeable to follow-up closely. [FC]      ED Course User Index  [FC] Gisella Walls, ARABELLA                                             No results found for this or any previous visit (from the past 24 hours).  Note: In addition to lab results from this visit, the labs listed above may include labs taken at another facility or during a different encounter within the last 24 hours. Please correlate lab times with ED admission and discharge times for further clarification of the services performed during this visit.    XR Hand 3+ View Right   Final Result   Impression:   Comminuted fracture of the distal fifth metacarpal with volar angulation.               Electronically Signed: Perez Wolf MD     7/11/2025 11:48 PM EDT     Workstation ID: [id number]        Vitals:    07/11/25 2239   BP: 142/99   Pulse: 98   Resp: 18   Temp: 98.8 °F (37.1 °C)   SpO2: 99%   Weight: 93 kg (205 lb)   Height: 175.3 cm (69\")     Medications   acetaminophen (TYLENOL) tablet 1,000 mg (1,000 mg Oral Given 7/11/25 2307)   ibuprofen (ADVIL,MOTRIN) tablet 600 mg (600 mg Oral Given 7/11/25 2307)     ECG/EMG Results (last 24 hours)       ** No results found for the last 24 hours. **          No orders to display                 Medical Decision Making  Problems Addressed:  Closed boxer's fracture, initial encounter: complicated acute illness or injury  History of hypertension: complicated acute illness or injury  Right hand pain: complicated acute illness or injury  Tobacco abuse: complicated " acute illness or injury    Amount and/or Complexity of Data Reviewed  Radiology: ordered.    Risk  OTC drugs.  Prescription drug management.        Final diagnoses:   Closed boxer's fracture, initial encounter   Right hand pain   Tobacco abuse   History of hypertension       ED Disposition  ED Disposition       ED Disposition   Discharge    Condition   Stable    Comment   --               [facility] EMERGENCY DEPARTMENT  [address] [id number] [phone]    If symptoms worsen    Jass Yee MD  [address] [address]  [phone]    Call in 2 days  Call Monday for close follow-up    Nelly Tee MD  [address] [address]  [phone]    Call in 2 days  Call Monday for close follow-up    [facility] EMERGENCY DEPARTMENT  [address] [facility] [facility] [id number] [phone]    If symptoms worsen         Medication List      No changes were made to your prescriptions during this visit.            Gisella Walls PA-C  07/11/25 2357       Gisella Walls PA-C  07/12/25 0011

## 2025-07-14 ENCOUNTER — TREATMENT (OUTPATIENT)
Dept: PHYSICAL THERAPY | Facility: CLINIC | Age: 35
End: 2025-07-14
Payer: MEDICAID

## 2025-07-14 ENCOUNTER — OFFICE VISIT (OUTPATIENT)
Dept: ORTHOPEDIC SURGERY | Facility: CLINIC | Age: 35
End: 2025-07-14
Payer: MEDICAID

## 2025-07-14 VITALS — BODY MASS INDEX: 30.36 KG/M2 | WEIGHT: 205 LBS | HEIGHT: 69 IN

## 2025-07-14 DIAGNOSIS — S62.336D CLOSED DISPLACED FRACTURE OF NECK OF RIGHT FIFTH METACARPAL BONE WITH ROUTINE HEALING: Primary | ICD-10-CM

## 2025-07-14 DIAGNOSIS — S62.336D CLOSED DISPLACED FRACTURE OF NECK OF FIFTH METACARPAL BONE OF RIGHT HAND WITH ROUTINE HEALING, SUBSEQUENT ENCOUNTER: Primary | ICD-10-CM

## 2025-07-14 PROBLEM — S62.336A CLOSED DISPLACED FRACTURE OF NECK OF RIGHT FIFTH METACARPAL BONE: Status: ACTIVE | Noted: 2025-07-14

## 2025-07-14 PROCEDURE — 1160F RVW MEDS BY RX/DR IN RCRD: CPT | Performed by: STUDENT IN AN ORGANIZED HEALTH CARE EDUCATION/TRAINING PROGRAM

## 2025-07-14 PROCEDURE — 99213 OFFICE O/P EST LOW 20 MIN: CPT | Performed by: STUDENT IN AN ORGANIZED HEALTH CARE EDUCATION/TRAINING PROGRAM

## 2025-07-14 PROCEDURE — L3913 HFO W/O JOINTS CF: HCPCS

## 2025-07-14 PROCEDURE — 1159F MED LIST DOCD IN RCRD: CPT | Performed by: STUDENT IN AN ORGANIZED HEALTH CARE EDUCATION/TRAINING PROGRAM

## 2025-07-14 NOTE — PROGRESS NOTES
[facility]  [facility], [address]  [place] [address]           Bradley Mukherjee 1990     Diagnosis/ Surgery: Closed displaced fracture of neck of right fifth metacarpal bone with routine healing     Date Of Onset: 7/11/2025      Date Of Surgery: N/A    Hand Dominance: R  History of Present Condition: Patient reports that on 7/11/2025 he was working on his vehicle when his hand slipped and he struck the side of his vehicle. He developed pain and swelling. He presented to the emergency department where x-rays were obtained demonstrating 1/5 metacarpal neck fracture.   Medical/Vocational History/ Medications:     Pain: 4/10      Edema: moderate  Sensibility: WNL   Wound Status: N/A  ROM/ Strength/Test: WNL    Splinting:  Patient was measured and fit with a custom fabricated hand based ulnar gutter.   Patient was instructed in wearing schedule, precautions and care of the splint during this visit.   Patient was instructed in proper donning/doffing of splint.   Assessment:  Patient was fitted and appropriate splint was fabricated this date.  Patient reported that splint was comfortable and had no complications with the fit of the splint.  Patient was instructed and patient verbalized understanding of precautions, wear and care of the splint.   Patient demonstrated independent donning/doffing of splint during treatment today.  Goals:  Patient was fitted properly with appropriate splint for diagnosis  Patient was educated on precautions, wear schedule and care of splint  Patient demonstrated independence with donning/doffing of the splint.  Splint was provided to Protect Healing Structures, Restrict Mobility, Improve joint alignment.  Plan:  No additional treatment is required for this patient at this time. The patient is therefore discharged from therapy.  Patient advised to contact therapist with any additional questions or concerns regarding the fit and function of the  splint.  Patient will be seen for splint issues as needed   Wear Instructions: Off for hygiene     PT: Leisa Jones PT, DPT    License Number: [id number]  Electronically signed by Leisa Jones PT, 07/14/25, 11:09 AM EDT    Certification Period: 7/14/2025 thru 10/11/2025  I certify that the therapy services are furnished while this patient is under my care.  The services outlined above are required by this patient, and will be reviewed every 90 days.     Physician Signature:__________________________________________________  Nelly Tee MD                                     DATE:_______________________________________________________________      Please sign and return via fax to [phone]. Thank you, [facility].

## 2025-07-14 NOTE — PROGRESS NOTES
"                                                                 [facility]     Office Visit       Date: 07/14/2025   Patient Name: Bradley Mukherjee  MRN: [id number]  YOB: 1990    Referring Physician: No ref. provider found     Chief Complaint:   Chief Complaint   Patient presents with    Right Hand - Pain     History of Present Illness:   Bradley Mukherjee is a 35 y.o. male right-hand-dominant presented clinic as new patient complaints of right small finger pain.  Patient reports that on 7/11/2025 he was working on his vehicle when his hand slipped and he struck the side of his vehicle.  He developed pain and swelling.  He presented to the emergency department where x-rays were obtained demonstrating 1/5 metacarpal neck fracture.  He was placed in a splint and instructed to follow-up.  He reports 5/10 pain.  No numbness or tingling.  No reinjury.  No other complaints.    Subjective   Review of Systems:   Review of Systems   Pertinent review of systems per HPI    I reviewed the patient's chief complaint, history of present illness, review of systems, past medical history, surgical history, family history, social history, medications and allergy list in the EMR on 07/14/2025 and agree with the findings above.    Objective    Vital Signs:   Vitals:    07/14/25 0932   Weight: 93 kg (205 lb)   Height: 175.3 cm (69\")     General: No acute distress. Alert and oriented.   Cardiovascular: Palpable radial pulse.   Respiratory: Breathing is nonlabored.   Ortho Exam:  Examination of right upper extremity demonstrates mild swelling along the dorsal ulnar aspect of the hand.  No open skin lesions or abrasions.  He is tender along the fifth metacarpal neck.  Nontender at the fifth MCP joint.  Nontender at the fifth CMC joint.  He is unable to make a full composite fist secondary to pain, however a half fist is achieved and there is no evidence of malrotation.  Sensations intact light touch " throughout the hand.  Warm well-perfused distally.    Imaging / Studies:    Imaging Results (Last 24 Hours)       ** No results found for the last 24 hours. **        Right hand x-rays obtained on 7/11/2025 were personally reviewed interpreted by myself.  These demonstrate a mildly displaced right fifth metacarpal neck fracture.  On the oblique view there is approximately 33 degrees of apex dorsal angulation.  No other acute findings.    Assessment / Plan    Assessment/Plan:   Bradley Mukherjee is a 35 y.o. male with a right fifth metacarpal neck fracture, DOI 7/11/25.    I discussed with the patient their clinical and radiographic findings demonstrate a minimally displaced fifth metacarpal neck fracture.  We had a lengthy discussion regarding the pathophysiology of their diagnosis.  Patient's fracture parameters are acceptable for nonoperative treatment.  I discussed immobilization in a hand-based ulnar gutter splint for 3 weeks.  He may come out of the splint for hygiene and for gentle range of motion.  I will see him back in 3 weeks for reevaluation and repeat x-rays.  I anticipate transitioning to dana loops at that time.  They were agreeable with the plan.  All questions and concerns were addressed.       ICD-10-CM ICD-9-CM   1. Closed displaced fracture of neck of fifth metacarpal bone of right hand with routine healing, subsequent encounter  S62.336D V54.19     Follow Up:   Return in about 3 weeks (around 8/4/2025) for Follow Up- with repeat xrays.      Nelly Tee MD  [facility]